# Patient Record
Sex: MALE | Race: WHITE | Employment: FULL TIME | URBAN - METROPOLITAN AREA
[De-identification: names, ages, dates, MRNs, and addresses within clinical notes are randomized per-mention and may not be internally consistent; named-entity substitution may affect disease eponyms.]

---

## 2017-11-17 PROBLEM — E11.618 DIABETIC ARTHROPATHY (HCC): Status: ACTIVE | Noted: 2017-11-17

## 2017-11-17 PROBLEM — I96 GANGRENE OF TOE OF LEFT FOOT (HCC): Status: ACTIVE | Noted: 2017-11-17

## 2017-11-19 PROBLEM — E66.01 MORBID (SEVERE) OBESITY DUE TO EXCESS CALORIES (HCC): Status: ACTIVE | Noted: 2017-11-19

## 2019-07-26 LAB
AVERAGE GLUCOSE: NORMAL
HBA1C MFR BLD: 12.4 %

## 2019-10-18 ENCOUNTER — APPOINTMENT (OUTPATIENT)
Dept: GENERAL RADIOLOGY | Age: 54
DRG: 617 | End: 2019-10-18
Payer: COMMERCIAL

## 2019-10-18 ENCOUNTER — HOSPITAL ENCOUNTER (INPATIENT)
Age: 54
LOS: 5 days | Discharge: HOME | DRG: 617 | End: 2019-10-23
Attending: EMERGENCY MEDICINE | Admitting: INTERNAL MEDICINE
Payer: COMMERCIAL

## 2019-10-18 DIAGNOSIS — E11.621 DIABETIC ULCER OF TOE OF RIGHT FOOT ASSOCIATED WITH TYPE 2 DIABETES MELLITUS, UNSPECIFIED ULCER STAGE (HCC): ICD-10-CM

## 2019-10-18 DIAGNOSIS — E10.621 DIABETIC ULCER OF TOE OF LEFT FOOT ASSOCIATED WITH TYPE 1 DIABETES MELLITUS, WITH NECROSIS OF BONE (HCC): Primary | ICD-10-CM

## 2019-10-18 DIAGNOSIS — L97.524 DIABETIC ULCER OF TOE OF LEFT FOOT ASSOCIATED WITH TYPE 1 DIABETES MELLITUS, WITH NECROSIS OF BONE (HCC): Primary | ICD-10-CM

## 2019-10-18 DIAGNOSIS — L97.519 DIABETIC ULCER OF TOE OF RIGHT FOOT ASSOCIATED WITH TYPE 2 DIABETES MELLITUS, UNSPECIFIED ULCER STAGE (HCC): ICD-10-CM

## 2019-10-18 PROBLEM — E78.1 PURE HYPERTRIGLYCERIDEMIA: Status: ACTIVE | Noted: 2019-10-18

## 2019-10-18 PROBLEM — L08.9 DIABETIC FOOT INFECTION (HCC): Status: ACTIVE | Noted: 2019-10-18

## 2019-10-18 PROBLEM — E11.628 DIABETIC FOOT INFECTION (HCC): Status: ACTIVE | Noted: 2019-10-18

## 2019-10-18 LAB
ALBUMIN SERPL-MCNC: 4 G/DL (ref 3.5–5.2)
ALP BLD-CCNC: 111 U/L (ref 40–129)
ALT SERPL-CCNC: 19 U/L (ref 0–40)
ANION GAP SERPL CALCULATED.3IONS-SCNC: 11 MMOL/L (ref 7–16)
AST SERPL-CCNC: 15 U/L (ref 0–39)
BASOPHILS ABSOLUTE: 0.07 E9/L (ref 0–0.2)
BASOPHILS RELATIVE PERCENT: 0.9 % (ref 0–2)
BILIRUB SERPL-MCNC: 0.5 MG/DL (ref 0–1.2)
BUN BLDV-MCNC: 13 MG/DL (ref 6–20)
CALCIUM SERPL-MCNC: 9.6 MG/DL (ref 8.6–10.2)
CHLORIDE BLD-SCNC: 95 MMOL/L (ref 98–107)
CO2: 26 MMOL/L (ref 22–29)
CREAT SERPL-MCNC: 0.8 MG/DL (ref 0.7–1.2)
EOSINOPHILS ABSOLUTE: 0 E9/L (ref 0.05–0.5)
EOSINOPHILS RELATIVE PERCENT: 0 % (ref 0–6)
GFR AFRICAN AMERICAN: >60
GFR NON-AFRICAN AMERICAN: >60 ML/MIN/1.73
GLUCOSE BLD-MCNC: 246 MG/DL (ref 74–99)
HBA1C MFR BLD: 10.1 % (ref 4–5.6)
HCT VFR BLD CALC: 44.5 % (ref 37–54)
HEMOGLOBIN: 15 G/DL (ref 12.5–16.5)
IMMATURE GRANULOCYTES #: 0.02 E9/L
IMMATURE GRANULOCYTES %: 0.3 % (ref 0–5)
LYMPHOCYTES ABSOLUTE: 2.47 E9/L (ref 1.5–4)
LYMPHOCYTES RELATIVE PERCENT: 31.9 % (ref 20–42)
MCH RBC QN AUTO: 28.8 PG (ref 26–35)
MCHC RBC AUTO-ENTMCNC: 33.7 % (ref 32–34.5)
MCV RBC AUTO: 85.6 FL (ref 80–99.9)
METER GLUCOSE: 210 MG/DL (ref 74–99)
METER GLUCOSE: 244 MG/DL (ref 74–99)
MONOCYTES ABSOLUTE: 0.6 E9/L (ref 0.1–0.95)
MONOCYTES RELATIVE PERCENT: 7.7 % (ref 2–12)
NEUTROPHILS ABSOLUTE: 4.59 E9/L (ref 1.8–7.3)
NEUTROPHILS RELATIVE PERCENT: 59.2 % (ref 43–80)
PDW BLD-RTO: 12.7 FL (ref 11.5–15)
PLATELET # BLD: 285 E9/L (ref 130–450)
PMV BLD AUTO: 9.3 FL (ref 7–12)
POTASSIUM SERPL-SCNC: 4.1 MMOL/L (ref 3.5–5)
RBC # BLD: 5.2 E12/L (ref 3.8–5.8)
SODIUM BLD-SCNC: 132 MMOL/L (ref 132–146)
TOTAL PROTEIN: 8.2 G/DL (ref 6.4–8.3)
WBC # BLD: 7.8 E9/L (ref 4.5–11.5)

## 2019-10-18 PROCEDURE — 2580000003 HC RX 258: Performed by: NURSE PRACTITIONER

## 2019-10-18 PROCEDURE — APPSS45 APP SPLIT SHARED TIME 31-45 MINUTES: Performed by: NURSE PRACTITIONER

## 2019-10-18 PROCEDURE — 82962 GLUCOSE BLOOD TEST: CPT

## 2019-10-18 PROCEDURE — 71046 X-RAY EXAM CHEST 2 VIEWS: CPT

## 2019-10-18 PROCEDURE — 85025 COMPLETE CBC W/AUTO DIFF WBC: CPT

## 2019-10-18 PROCEDURE — 6360000002 HC RX W HCPCS: Performed by: EMERGENCY MEDICINE

## 2019-10-18 PROCEDURE — 93005 ELECTROCARDIOGRAM TRACING: CPT | Performed by: EMERGENCY MEDICINE

## 2019-10-18 PROCEDURE — 6370000000 HC RX 637 (ALT 250 FOR IP): Performed by: NURSE PRACTITIONER

## 2019-10-18 PROCEDURE — 99223 1ST HOSP IP/OBS HIGH 75: CPT | Performed by: INTERNAL MEDICINE

## 2019-10-18 PROCEDURE — 80053 COMPREHEN METABOLIC PANEL: CPT

## 2019-10-18 PROCEDURE — 1200000000 HC SEMI PRIVATE

## 2019-10-18 PROCEDURE — 73630 X-RAY EXAM OF FOOT: CPT

## 2019-10-18 PROCEDURE — 2580000003 HC RX 258: Performed by: EMERGENCY MEDICINE

## 2019-10-18 PROCEDURE — 83036 HEMOGLOBIN GLYCOSYLATED A1C: CPT

## 2019-10-18 PROCEDURE — 6360000002 HC RX W HCPCS: Performed by: NURSE PRACTITIONER

## 2019-10-18 PROCEDURE — 87040 BLOOD CULTURE FOR BACTERIA: CPT

## 2019-10-18 PROCEDURE — 96367 TX/PROPH/DG ADDL SEQ IV INF: CPT

## 2019-10-18 PROCEDURE — 36415 COLL VENOUS BLD VENIPUNCTURE: CPT

## 2019-10-18 PROCEDURE — 99285 EMERGENCY DEPT VISIT HI MDM: CPT

## 2019-10-18 PROCEDURE — 96365 THER/PROPH/DIAG IV INF INIT: CPT

## 2019-10-18 RX ORDER — ACETAMINOPHEN 325 MG/1
650 TABLET ORAL EVERY 4 HOURS PRN
Status: DISCONTINUED | OUTPATIENT
Start: 2019-10-18 | End: 2019-10-24 | Stop reason: HOSPADM

## 2019-10-18 RX ORDER — INSULIN GLARGINE 100 [IU]/ML
56 INJECTION, SOLUTION SUBCUTANEOUS NIGHTLY
Status: DISCONTINUED | OUTPATIENT
Start: 2019-10-18 | End: 2019-10-20

## 2019-10-18 RX ORDER — SODIUM CHLORIDE 0.9 % (FLUSH) 0.9 %
10 SYRINGE (ML) INJECTION EVERY 12 HOURS SCHEDULED
Status: DISCONTINUED | OUTPATIENT
Start: 2019-10-18 | End: 2019-10-24 | Stop reason: HOSPADM

## 2019-10-18 RX ORDER — ATORVASTATIN CALCIUM 40 MG/1
40 TABLET, FILM COATED ORAL DAILY
Status: DISCONTINUED | OUTPATIENT
Start: 2019-10-18 | End: 2019-10-24 | Stop reason: HOSPADM

## 2019-10-18 RX ORDER — 0.9 % SODIUM CHLORIDE 0.9 %
500 INTRAVENOUS SOLUTION INTRAVENOUS ONCE
Status: COMPLETED | OUTPATIENT
Start: 2019-10-18 | End: 2019-10-18

## 2019-10-18 RX ORDER — SODIUM CHLORIDE 0.9 % (FLUSH) 0.9 %
10 SYRINGE (ML) INJECTION PRN
Status: DISCONTINUED | OUTPATIENT
Start: 2019-10-18 | End: 2019-10-24 | Stop reason: HOSPADM

## 2019-10-18 RX ORDER — ATORVASTATIN CALCIUM 40 MG/1
40 TABLET, FILM COATED ORAL DAILY
COMMUNITY
End: 2022-03-15 | Stop reason: SDUPTHER

## 2019-10-18 RX ORDER — CETIRIZINE HYDROCHLORIDE 10 MG/1
10 TABLET ORAL DAILY
Status: DISCONTINUED | OUTPATIENT
Start: 2019-10-19 | End: 2019-10-24 | Stop reason: HOSPADM

## 2019-10-18 RX ORDER — ONDANSETRON 2 MG/ML
4 INJECTION INTRAMUSCULAR; INTRAVENOUS EVERY 6 HOURS PRN
Status: DISCONTINUED | OUTPATIENT
Start: 2019-10-18 | End: 2019-10-24 | Stop reason: HOSPADM

## 2019-10-18 RX ADMIN — Medication 10 ML: at 21:09

## 2019-10-18 RX ADMIN — INSULIN LISPRO 2 UNITS: 100 INJECTION, SOLUTION INTRAVENOUS; SUBCUTANEOUS at 17:37

## 2019-10-18 RX ADMIN — INSULIN LISPRO 1 UNITS: 100 INJECTION, SOLUTION INTRAVENOUS; SUBCUTANEOUS at 20:58

## 2019-10-18 RX ADMIN — ATORVASTATIN CALCIUM 40 MG: 40 TABLET, FILM COATED ORAL at 17:36

## 2019-10-18 RX ADMIN — VANCOMYCIN HYDROCHLORIDE 2500 MG: 5 INJECTION, POWDER, LYOPHILIZED, FOR SOLUTION INTRAVENOUS at 13:53

## 2019-10-18 RX ADMIN — ENOXAPARIN SODIUM 40 MG: 40 INJECTION SUBCUTANEOUS at 17:36

## 2019-10-18 RX ADMIN — PIPERACILLIN SODIUM AND TAZOBACTAM SODIUM 3.38 G: 3; .375 INJECTION, POWDER, LYOPHILIZED, FOR SOLUTION INTRAVENOUS at 21:08

## 2019-10-18 RX ADMIN — INSULIN GLARGINE 56 UNITS: 100 INJECTION, SOLUTION SUBCUTANEOUS at 20:58

## 2019-10-18 RX ADMIN — SODIUM CHLORIDE 500 ML: 9 INJECTION, SOLUTION INTRAVENOUS at 13:06

## 2019-10-18 RX ADMIN — PIPERACILLIN AND TAZOBACTAM 3.38 G: 3; .375 INJECTION, POWDER, FOR SOLUTION INTRAVENOUS at 13:05

## 2019-10-18 ASSESSMENT — PAIN SCALES - GENERAL
PAINLEVEL_OUTOF10: 0
PAINLEVEL_OUTOF10: 3
PAINLEVEL_OUTOF10: 0

## 2019-10-18 ASSESSMENT — PAIN DESCRIPTION - ORIENTATION: ORIENTATION: LEFT

## 2019-10-18 ASSESSMENT — ENCOUNTER SYMPTOMS
ABDOMINAL PAIN: 0
SHORTNESS OF BREATH: 0
COLOR CHANGE: 1
CONSTIPATION: 0
DIARRHEA: 0
NAUSEA: 0
BACK PAIN: 0
VOMITING: 0

## 2019-10-18 ASSESSMENT — PAIN DESCRIPTION - PAIN TYPE: TYPE: ACUTE PAIN

## 2019-10-18 ASSESSMENT — PAIN DESCRIPTION - LOCATION: LOCATION: FOOT

## 2019-10-18 ASSESSMENT — PAIN DESCRIPTION - DESCRIPTORS: DESCRIPTORS: CONSTANT;ACHING

## 2019-10-19 LAB
ANION GAP SERPL CALCULATED.3IONS-SCNC: 9 MMOL/L (ref 7–16)
BUN BLDV-MCNC: 12 MG/DL (ref 6–20)
C-REACTIVE PROTEIN: 2.5 MG/DL (ref 0–0.4)
CALCIUM SERPL-MCNC: 8.5 MG/DL (ref 8.6–10.2)
CHLORIDE BLD-SCNC: 98 MMOL/L (ref 98–107)
CO2: 24 MMOL/L (ref 22–29)
CREAT SERPL-MCNC: 0.9 MG/DL (ref 0.7–1.2)
EKG ATRIAL RATE: 95 BPM
EKG P AXIS: 42 DEGREES
EKG P-R INTERVAL: 208 MS
EKG Q-T INTERVAL: 348 MS
EKG QRS DURATION: 78 MS
EKG QTC CALCULATION (BAZETT): 437 MS
EKG R AXIS: 3 DEGREES
EKG T AXIS: 45 DEGREES
EKG VENTRICULAR RATE: 95 BPM
GFR AFRICAN AMERICAN: >60
GFR NON-AFRICAN AMERICAN: >60 ML/MIN/1.73
GLUCOSE BLD-MCNC: 271 MG/DL (ref 74–99)
HCT VFR BLD CALC: 44.8 % (ref 37–54)
HEMOGLOBIN: 14.7 G/DL (ref 12.5–16.5)
MCH RBC QN AUTO: 28.4 PG (ref 26–35)
MCHC RBC AUTO-ENTMCNC: 32.8 % (ref 32–34.5)
MCV RBC AUTO: 86.5 FL (ref 80–99.9)
METER GLUCOSE: 229 MG/DL (ref 74–99)
METER GLUCOSE: 246 MG/DL (ref 74–99)
METER GLUCOSE: 293 MG/DL (ref 74–99)
METER GLUCOSE: 302 MG/DL (ref 74–99)
PDW BLD-RTO: 12.6 FL (ref 11.5–15)
PLATELET # BLD: 251 E9/L (ref 130–450)
PMV BLD AUTO: 9.3 FL (ref 7–12)
POTASSIUM REFLEX MAGNESIUM: 4.1 MMOL/L (ref 3.5–5)
RBC # BLD: 5.18 E12/L (ref 3.8–5.8)
SEDIMENTATION RATE, ERYTHROCYTE: 23 MM/HR (ref 0–15)
SODIUM BLD-SCNC: 131 MMOL/L (ref 132–146)
WBC # BLD: 6.2 E9/L (ref 4.5–11.5)

## 2019-10-19 PROCEDURE — 1200000000 HC SEMI PRIVATE

## 2019-10-19 PROCEDURE — APPSS30 APP SPLIT SHARED TIME 16-30 MINUTES: Performed by: NURSE PRACTITIONER

## 2019-10-19 PROCEDURE — 85651 RBC SED RATE NONAUTOMATED: CPT

## 2019-10-19 PROCEDURE — 85027 COMPLETE CBC AUTOMATED: CPT

## 2019-10-19 PROCEDURE — 87070 CULTURE OTHR SPECIMN AEROBIC: CPT

## 2019-10-19 PROCEDURE — 99233 SBSQ HOSP IP/OBS HIGH 50: CPT | Performed by: INTERNAL MEDICINE

## 2019-10-19 PROCEDURE — 93010 ELECTROCARDIOGRAM REPORT: CPT | Performed by: INTERNAL MEDICINE

## 2019-10-19 PROCEDURE — 87186 SC STD MICRODIL/AGAR DIL: CPT

## 2019-10-19 PROCEDURE — 86140 C-REACTIVE PROTEIN: CPT

## 2019-10-19 PROCEDURE — 2580000003 HC RX 258: Performed by: NURSE PRACTITIONER

## 2019-10-19 PROCEDURE — 82962 GLUCOSE BLOOD TEST: CPT

## 2019-10-19 PROCEDURE — 6370000000 HC RX 637 (ALT 250 FOR IP): Performed by: NURSE PRACTITIONER

## 2019-10-19 PROCEDURE — 6360000002 HC RX W HCPCS: Performed by: NURSE PRACTITIONER

## 2019-10-19 PROCEDURE — 36415 COLL VENOUS BLD VENIPUNCTURE: CPT

## 2019-10-19 PROCEDURE — 80048 BASIC METABOLIC PNL TOTAL CA: CPT

## 2019-10-19 RX ORDER — DEXTROSE MONOHYDRATE 25 G/50ML
12.5 INJECTION, SOLUTION INTRAVENOUS PRN
Status: DISCONTINUED | OUTPATIENT
Start: 2019-10-19 | End: 2019-10-24 | Stop reason: HOSPADM

## 2019-10-19 RX ORDER — DEXTROSE MONOHYDRATE 50 MG/ML
100 INJECTION, SOLUTION INTRAVENOUS PRN
Status: DISCONTINUED | OUTPATIENT
Start: 2019-10-19 | End: 2019-10-24 | Stop reason: HOSPADM

## 2019-10-19 RX ORDER — NICOTINE POLACRILEX 4 MG
15 LOZENGE BUCCAL PRN
Status: DISCONTINUED | OUTPATIENT
Start: 2019-10-19 | End: 2019-10-24 | Stop reason: HOSPADM

## 2019-10-19 RX ADMIN — Medication 10 ML: at 08:46

## 2019-10-19 RX ADMIN — INSULIN LISPRO 4 UNITS: 100 INJECTION, SOLUTION INTRAVENOUS; SUBCUTANEOUS at 16:38

## 2019-10-19 RX ADMIN — ENOXAPARIN SODIUM 40 MG: 40 INJECTION SUBCUTANEOUS at 15:34

## 2019-10-19 RX ADMIN — PIPERACILLIN SODIUM AND TAZOBACTAM SODIUM 3.38 G: 3; .375 INJECTION, POWDER, LYOPHILIZED, FOR SOLUTION INTRAVENOUS at 05:16

## 2019-10-19 RX ADMIN — INSULIN LISPRO 4 UNITS: 100 INJECTION, SOLUTION INTRAVENOUS; SUBCUTANEOUS at 12:09

## 2019-10-19 RX ADMIN — Medication 2000 MG: at 01:41

## 2019-10-19 RX ADMIN — PIPERACILLIN SODIUM AND TAZOBACTAM SODIUM 3.38 G: 3; .375 INJECTION, POWDER, LYOPHILIZED, FOR SOLUTION INTRAVENOUS at 12:48

## 2019-10-19 RX ADMIN — ATORVASTATIN CALCIUM 40 MG: 40 TABLET, FILM COATED ORAL at 08:45

## 2019-10-19 RX ADMIN — Medication 10 ML: at 20:29

## 2019-10-19 RX ADMIN — INSULIN LISPRO 3 UNITS: 100 INJECTION, SOLUTION INTRAVENOUS; SUBCUTANEOUS at 08:46

## 2019-10-19 RX ADMIN — INSULIN LISPRO 4 UNITS: 100 INJECTION, SOLUTION INTRAVENOUS; SUBCUTANEOUS at 20:27

## 2019-10-19 RX ADMIN — INSULIN GLARGINE 56 UNITS: 100 INJECTION, SOLUTION SUBCUTANEOUS at 20:28

## 2019-10-19 RX ADMIN — Medication 2000 MG: at 14:38

## 2019-10-19 RX ADMIN — PIPERACILLIN SODIUM AND TAZOBACTAM SODIUM 3.38 G: 3; .375 INJECTION, POWDER, LYOPHILIZED, FOR SOLUTION INTRAVENOUS at 20:29

## 2019-10-19 ASSESSMENT — PAIN - FUNCTIONAL ASSESSMENT: PAIN_FUNCTIONAL_ASSESSMENT: PREVENTS OR INTERFERES SOME ACTIVE ACTIVITIES AND ADLS

## 2019-10-19 ASSESSMENT — PAIN SCALES - GENERAL
PAINLEVEL_OUTOF10: 0

## 2019-10-19 ASSESSMENT — PAIN DESCRIPTION - PROGRESSION
CLINICAL_PROGRESSION: NOT CHANGED

## 2019-10-19 ASSESSMENT — PAIN DESCRIPTION - PAIN TYPE: TYPE: ACUTE PAIN

## 2019-10-19 ASSESSMENT — PAIN DESCRIPTION - ONSET: ONSET: ON-GOING

## 2019-10-19 ASSESSMENT — PAIN DESCRIPTION - DESCRIPTORS: DESCRIPTORS: CONSTANT;ACHING

## 2019-10-19 ASSESSMENT — PAIN DESCRIPTION - LOCATION: LOCATION: FOOT

## 2019-10-19 ASSESSMENT — PAIN DESCRIPTION - ORIENTATION: ORIENTATION: LEFT

## 2019-10-20 LAB
METER GLUCOSE: 188 MG/DL (ref 74–99)
METER GLUCOSE: 207 MG/DL (ref 74–99)
METER GLUCOSE: 223 MG/DL (ref 74–99)
METER GLUCOSE: 263 MG/DL (ref 74–99)
VANCOMYCIN TROUGH: 18.1 MCG/ML (ref 5–16)

## 2019-10-20 PROCEDURE — 6370000000 HC RX 637 (ALT 250 FOR IP): Performed by: PODIATRIST

## 2019-10-20 PROCEDURE — 80202 ASSAY OF VANCOMYCIN: CPT

## 2019-10-20 PROCEDURE — 6360000002 HC RX W HCPCS: Performed by: NURSE PRACTITIONER

## 2019-10-20 PROCEDURE — 36415 COLL VENOUS BLD VENIPUNCTURE: CPT

## 2019-10-20 PROCEDURE — 82962 GLUCOSE BLOOD TEST: CPT

## 2019-10-20 PROCEDURE — 99233 SBSQ HOSP IP/OBS HIGH 50: CPT | Performed by: INTERNAL MEDICINE

## 2019-10-20 PROCEDURE — 2580000003 HC RX 258: Performed by: NURSE PRACTITIONER

## 2019-10-20 PROCEDURE — 1200000000 HC SEMI PRIVATE

## 2019-10-20 PROCEDURE — 6370000000 HC RX 637 (ALT 250 FOR IP): Performed by: NURSE PRACTITIONER

## 2019-10-20 PROCEDURE — APPSS30 APP SPLIT SHARED TIME 16-30 MINUTES: Performed by: NURSE PRACTITIONER

## 2019-10-20 RX ORDER — INSULIN GLARGINE 100 [IU]/ML
30 INJECTION, SOLUTION SUBCUTANEOUS 2 TIMES DAILY
Status: DISCONTINUED | OUTPATIENT
Start: 2019-10-20 | End: 2019-10-24 | Stop reason: HOSPADM

## 2019-10-20 RX ADMIN — PIPERACILLIN SODIUM AND TAZOBACTAM SODIUM 3.38 G: 3; .375 INJECTION, POWDER, LYOPHILIZED, FOR SOLUTION INTRAVENOUS at 14:18

## 2019-10-20 RX ADMIN — HYOSCYAMINE SULFATE: 16 SOLUTION at 14:22

## 2019-10-20 RX ADMIN — INSULIN LISPRO 2 UNITS: 100 INJECTION, SOLUTION INTRAVENOUS; SUBCUTANEOUS at 12:03

## 2019-10-20 RX ADMIN — Medication 10 ML: at 09:23

## 2019-10-20 RX ADMIN — ATORVASTATIN CALCIUM 40 MG: 40 TABLET, FILM COATED ORAL at 09:21

## 2019-10-20 RX ADMIN — PIPERACILLIN SODIUM AND TAZOBACTAM SODIUM 3.38 G: 3; .375 INJECTION, POWDER, LYOPHILIZED, FOR SOLUTION INTRAVENOUS at 20:31

## 2019-10-20 RX ADMIN — PIPERACILLIN SODIUM AND TAZOBACTAM SODIUM 3.38 G: 3; .375 INJECTION, POWDER, LYOPHILIZED, FOR SOLUTION INTRAVENOUS at 04:42

## 2019-10-20 RX ADMIN — INSULIN GLARGINE 30 UNITS: 100 INJECTION, SOLUTION SUBCUTANEOUS at 20:31

## 2019-10-20 RX ADMIN — INSULIN LISPRO 4 UNITS: 100 INJECTION, SOLUTION INTRAVENOUS; SUBCUTANEOUS at 17:09

## 2019-10-20 RX ADMIN — INSULIN GLARGINE 30 UNITS: 100 INJECTION, SOLUTION SUBCUTANEOUS at 12:02

## 2019-10-20 RX ADMIN — Medication 10 ML: at 20:49

## 2019-10-20 RX ADMIN — Medication 2000 MG: at 14:17

## 2019-10-20 RX ADMIN — ENOXAPARIN SODIUM 40 MG: 40 INJECTION SUBCUTANEOUS at 09:24

## 2019-10-20 RX ADMIN — INSULIN LISPRO 6 UNITS: 100 INJECTION, SOLUTION INTRAVENOUS; SUBCUTANEOUS at 09:20

## 2019-10-20 RX ADMIN — Medication 2000 MG: at 01:30

## 2019-10-20 RX ADMIN — INSULIN LISPRO 2 UNITS: 100 INJECTION, SOLUTION INTRAVENOUS; SUBCUTANEOUS at 20:31

## 2019-10-20 ASSESSMENT — PAIN SCALES - GENERAL
PAINLEVEL_OUTOF10: 0
PAINLEVEL_OUTOF10: 0

## 2019-10-21 ENCOUNTER — ANESTHESIA (OUTPATIENT)
Dept: OPERATING ROOM | Age: 54
DRG: 617 | End: 2019-10-21
Payer: COMMERCIAL

## 2019-10-21 ENCOUNTER — ANESTHESIA EVENT (OUTPATIENT)
Dept: OPERATING ROOM | Age: 54
DRG: 617 | End: 2019-10-21
Payer: COMMERCIAL

## 2019-10-21 VITALS
DIASTOLIC BLOOD PRESSURE: 70 MMHG | SYSTOLIC BLOOD PRESSURE: 119 MMHG | RESPIRATION RATE: 21 BRPM | OXYGEN SATURATION: 97 %

## 2019-10-21 LAB
METER GLUCOSE: 145 MG/DL (ref 74–99)
METER GLUCOSE: 168 MG/DL (ref 74–99)
METER GLUCOSE: 185 MG/DL (ref 74–99)
METER GLUCOSE: 292 MG/DL (ref 74–99)

## 2019-10-21 PROCEDURE — 88311 DECALCIFY TISSUE: CPT

## 2019-10-21 PROCEDURE — 1200000000 HC SEMI PRIVATE

## 2019-10-21 PROCEDURE — 2580000003 HC RX 258: Performed by: NURSE PRACTITIONER

## 2019-10-21 PROCEDURE — 87075 CULTR BACTERIA EXCEPT BLOOD: CPT

## 2019-10-21 PROCEDURE — 0Y6S0Z0 DETACHMENT AT LEFT 2ND TOE, COMPLETE, OPEN APPROACH: ICD-10-PCS | Performed by: PODIATRIST

## 2019-10-21 PROCEDURE — APPSS30 APP SPLIT SHARED TIME 16-30 MINUTES: Performed by: NURSE PRACTITIONER

## 2019-10-21 PROCEDURE — 87205 SMEAR GRAM STAIN: CPT

## 2019-10-21 PROCEDURE — 82962 GLUCOSE BLOOD TEST: CPT

## 2019-10-21 PROCEDURE — 6360000002 HC RX W HCPCS: Performed by: NURSE ANESTHETIST, CERTIFIED REGISTERED

## 2019-10-21 PROCEDURE — 2500000003 HC RX 250 WO HCPCS: Performed by: PODIATRIST

## 2019-10-21 PROCEDURE — 7100000000 HC PACU RECOVERY - FIRST 15 MIN: Performed by: PODIATRIST

## 2019-10-21 PROCEDURE — 3600000002 HC SURGERY LEVEL 2 BASE: Performed by: PODIATRIST

## 2019-10-21 PROCEDURE — 88305 TISSUE EXAM BY PATHOLOGIST: CPT

## 2019-10-21 PROCEDURE — 3600000012 HC SURGERY LEVEL 2 ADDTL 15MIN: Performed by: PODIATRIST

## 2019-10-21 PROCEDURE — 2709999900 HC NON-CHARGEABLE SUPPLY: Performed by: PODIATRIST

## 2019-10-21 PROCEDURE — 6370000000 HC RX 637 (ALT 250 FOR IP): Performed by: SPECIALIST

## 2019-10-21 PROCEDURE — 99233 SBSQ HOSP IP/OBS HIGH 50: CPT | Performed by: INTERNAL MEDICINE

## 2019-10-21 PROCEDURE — 3700000001 HC ADD 15 MINUTES (ANESTHESIA): Performed by: PODIATRIST

## 2019-10-21 PROCEDURE — 3700000000 HC ANESTHESIA ATTENDED CARE: Performed by: PODIATRIST

## 2019-10-21 PROCEDURE — 6370000000 HC RX 637 (ALT 250 FOR IP): Performed by: NURSE PRACTITIONER

## 2019-10-21 PROCEDURE — 2580000003 HC RX 258: Performed by: NURSE ANESTHETIST, CERTIFIED REGISTERED

## 2019-10-21 PROCEDURE — 7100000001 HC PACU RECOVERY - ADDTL 15 MIN: Performed by: PODIATRIST

## 2019-10-21 PROCEDURE — 6360000002 HC RX W HCPCS: Performed by: NURSE PRACTITIONER

## 2019-10-21 PROCEDURE — 87070 CULTURE OTHR SPECIMN AEROBIC: CPT

## 2019-10-21 RX ORDER — LIDOCAINE HYDROCHLORIDE 10 MG/ML
INJECTION, SOLUTION EPIDURAL; INFILTRATION; INTRACAUDAL; PERINEURAL PRN
Status: DISCONTINUED | OUTPATIENT
Start: 2019-10-21 | End: 2019-10-21 | Stop reason: ALTCHOICE

## 2019-10-21 RX ORDER — LABETALOL 20 MG/4 ML (5 MG/ML) INTRAVENOUS SYRINGE
5 EVERY 10 MIN PRN
Status: DISCONTINUED | OUTPATIENT
Start: 2019-10-21 | End: 2019-10-21 | Stop reason: HOSPADM

## 2019-10-21 RX ORDER — SODIUM CHLORIDE 9 MG/ML
INJECTION, SOLUTION INTRAVENOUS CONTINUOUS PRN
Status: DISCONTINUED | OUTPATIENT
Start: 2019-10-21 | End: 2019-10-21 | Stop reason: SDUPTHER

## 2019-10-21 RX ORDER — SODIUM CHLORIDE 0.9 % (FLUSH) 0.9 %
10 SYRINGE (ML) INJECTION PRN
Status: DISCONTINUED | OUTPATIENT
Start: 2019-10-21 | End: 2019-10-21 | Stop reason: HOSPADM

## 2019-10-21 RX ORDER — HYDROMORPHONE HYDROCHLORIDE 1 MG/ML
0.5 INJECTION, SOLUTION INTRAMUSCULAR; INTRAVENOUS; SUBCUTANEOUS EVERY 5 MIN PRN
Status: DISCONTINUED | OUTPATIENT
Start: 2019-10-21 | End: 2019-10-21 | Stop reason: HOSPADM

## 2019-10-21 RX ORDER — HYDROMORPHONE HYDROCHLORIDE 1 MG/ML
0.25 INJECTION, SOLUTION INTRAMUSCULAR; INTRAVENOUS; SUBCUTANEOUS EVERY 5 MIN PRN
Status: DISCONTINUED | OUTPATIENT
Start: 2019-10-21 | End: 2019-10-21 | Stop reason: HOSPADM

## 2019-10-21 RX ORDER — PROMETHAZINE HYDROCHLORIDE 25 MG/ML
25 INJECTION, SOLUTION INTRAMUSCULAR; INTRAVENOUS PRN
Status: DISCONTINUED | OUTPATIENT
Start: 2019-10-21 | End: 2019-10-21 | Stop reason: HOSPADM

## 2019-10-21 RX ORDER — PROPOFOL 10 MG/ML
INJECTION, EMULSION INTRAVENOUS CONTINUOUS PRN
Status: DISCONTINUED | OUTPATIENT
Start: 2019-10-21 | End: 2019-10-21 | Stop reason: SDUPTHER

## 2019-10-21 RX ORDER — MEPERIDINE HYDROCHLORIDE 25 MG/ML
12.5 INJECTION INTRAMUSCULAR; INTRAVENOUS; SUBCUTANEOUS EVERY 5 MIN PRN
Status: DISCONTINUED | OUTPATIENT
Start: 2019-10-21 | End: 2019-10-21 | Stop reason: HOSPADM

## 2019-10-21 RX ORDER — MIDAZOLAM HYDROCHLORIDE 1 MG/ML
INJECTION INTRAMUSCULAR; INTRAVENOUS PRN
Status: DISCONTINUED | OUTPATIENT
Start: 2019-10-21 | End: 2019-10-21 | Stop reason: SDUPTHER

## 2019-10-21 RX ORDER — SODIUM CHLORIDE 0.9 % (FLUSH) 0.9 %
10 SYRINGE (ML) INJECTION EVERY 12 HOURS SCHEDULED
Status: DISCONTINUED | OUTPATIENT
Start: 2019-10-21 | End: 2019-10-21 | Stop reason: HOSPADM

## 2019-10-21 RX ADMIN — PIPERACILLIN SODIUM AND TAZOBACTAM SODIUM 3.38 G: 3; .375 INJECTION, POWDER, LYOPHILIZED, FOR SOLUTION INTRAVENOUS at 21:22

## 2019-10-21 RX ADMIN — Medication 10 ML: at 21:22

## 2019-10-21 RX ADMIN — PIPERACILLIN SODIUM AND TAZOBACTAM SODIUM 3.38 G: 3; .375 INJECTION, POWDER, LYOPHILIZED, FOR SOLUTION INTRAVENOUS at 13:14

## 2019-10-21 RX ADMIN — INSULIN GLARGINE 30 UNITS: 100 INJECTION, SOLUTION SUBCUTANEOUS at 21:23

## 2019-10-21 RX ADMIN — INSULIN LISPRO 1 UNITS: 100 INJECTION, SOLUTION INTRAVENOUS; SUBCUTANEOUS at 21:30

## 2019-10-21 RX ADMIN — PROPOFOL 100 MCG/KG/MIN: 10 INJECTION, EMULSION INTRAVENOUS at 19:23

## 2019-10-21 RX ADMIN — NYSTATIN 500000 UNITS: 100000 SUSPENSION ORAL at 21:22

## 2019-10-21 RX ADMIN — PIPERACILLIN SODIUM AND TAZOBACTAM SODIUM 3.38 G: 3; .375 INJECTION, POWDER, LYOPHILIZED, FOR SOLUTION INTRAVENOUS at 05:00

## 2019-10-21 RX ADMIN — Medication 2000 MG: at 13:54

## 2019-10-21 RX ADMIN — SODIUM CHLORIDE: 9 INJECTION, SOLUTION INTRAVENOUS at 19:20

## 2019-10-21 RX ADMIN — Medication 2000 MG: at 02:02

## 2019-10-21 RX ADMIN — Medication 10 ML: at 09:46

## 2019-10-21 RX ADMIN — MIDAZOLAM 2 MG: 1 INJECTION INTRAMUSCULAR; INTRAVENOUS at 19:20

## 2019-10-21 ASSESSMENT — PULMONARY FUNCTION TESTS
PIF_VALUE: 0
PIF_VALUE: 1
PIF_VALUE: 0
PIF_VALUE: 1
PIF_VALUE: 0
PIF_VALUE: 1
PIF_VALUE: 0
PIF_VALUE: 1

## 2019-10-21 ASSESSMENT — PAIN SCALES - GENERAL
PAINLEVEL_OUTOF10: 0

## 2019-10-22 LAB
ANION GAP SERPL CALCULATED.3IONS-SCNC: 12 MMOL/L (ref 7–16)
BUN BLDV-MCNC: 8 MG/DL (ref 6–20)
CALCIUM SERPL-MCNC: 8.5 MG/DL (ref 8.6–10.2)
CHLORIDE BLD-SCNC: 99 MMOL/L (ref 98–107)
CO2: 24 MMOL/L (ref 22–29)
CREAT SERPL-MCNC: 0.9 MG/DL (ref 0.7–1.2)
GFR AFRICAN AMERICAN: >60
GFR NON-AFRICAN AMERICAN: >60 ML/MIN/1.73
GLUCOSE BLD-MCNC: 264 MG/DL (ref 74–99)
HCT VFR BLD CALC: 42.2 % (ref 37–54)
HEMOGLOBIN: 13.7 G/DL (ref 12.5–16.5)
MCH RBC QN AUTO: 28.3 PG (ref 26–35)
MCHC RBC AUTO-ENTMCNC: 32.5 % (ref 32–34.5)
MCV RBC AUTO: 87.2 FL (ref 80–99.9)
METER GLUCOSE: 164 MG/DL (ref 74–99)
METER GLUCOSE: 177 MG/DL (ref 74–99)
METER GLUCOSE: 181 MG/DL (ref 74–99)
METER GLUCOSE: 220 MG/DL (ref 74–99)
ORGANISM: ABNORMAL
PDW BLD-RTO: 13 FL (ref 11.5–15)
PLATELET # BLD: 246 E9/L (ref 130–450)
PMV BLD AUTO: 9.6 FL (ref 7–12)
POTASSIUM SERPL-SCNC: 3.9 MMOL/L (ref 3.5–5)
RBC # BLD: 4.84 E12/L (ref 3.8–5.8)
SODIUM BLD-SCNC: 135 MMOL/L (ref 132–146)
WBC # BLD: 7.4 E9/L (ref 4.5–11.5)
WOUND/ABSCESS: ABNORMAL
WOUND/ABSCESS: ABNORMAL

## 2019-10-22 PROCEDURE — 6360000002 HC RX W HCPCS: Performed by: NURSE PRACTITIONER

## 2019-10-22 PROCEDURE — 85027 COMPLETE CBC AUTOMATED: CPT

## 2019-10-22 PROCEDURE — 80048 BASIC METABOLIC PNL TOTAL CA: CPT

## 2019-10-22 PROCEDURE — 6370000000 HC RX 637 (ALT 250 FOR IP): Performed by: SPECIALIST

## 2019-10-22 PROCEDURE — 82962 GLUCOSE BLOOD TEST: CPT

## 2019-10-22 PROCEDURE — 6370000000 HC RX 637 (ALT 250 FOR IP): Performed by: NURSE PRACTITIONER

## 2019-10-22 PROCEDURE — 1200000000 HC SEMI PRIVATE

## 2019-10-22 PROCEDURE — 2580000003 HC RX 258: Performed by: NURSE PRACTITIONER

## 2019-10-22 PROCEDURE — APPSS30 APP SPLIT SHARED TIME 16-30 MINUTES: Performed by: NURSE PRACTITIONER

## 2019-10-22 PROCEDURE — 36415 COLL VENOUS BLD VENIPUNCTURE: CPT

## 2019-10-22 PROCEDURE — 99233 SBSQ HOSP IP/OBS HIGH 50: CPT | Performed by: INTERNAL MEDICINE

## 2019-10-22 RX ADMIN — Medication 10 ML: at 20:48

## 2019-10-22 RX ADMIN — INSULIN LISPRO 4 UNITS: 100 INJECTION, SOLUTION INTRAVENOUS; SUBCUTANEOUS at 17:03

## 2019-10-22 RX ADMIN — NYSTATIN 500000 UNITS: 100000 SUSPENSION ORAL at 08:35

## 2019-10-22 RX ADMIN — Medication 2000 MG: at 01:54

## 2019-10-22 RX ADMIN — CETIRIZINE HYDROCHLORIDE 10 MG: 10 TABLET, FILM COATED ORAL at 08:35

## 2019-10-22 RX ADMIN — ATORVASTATIN CALCIUM 40 MG: 40 TABLET, FILM COATED ORAL at 08:35

## 2019-10-22 RX ADMIN — Medication 2000 MG: at 14:55

## 2019-10-22 RX ADMIN — INSULIN LISPRO 2 UNITS: 100 INJECTION, SOLUTION INTRAVENOUS; SUBCUTANEOUS at 12:09

## 2019-10-22 RX ADMIN — NYSTATIN 500000 UNITS: 100000 SUSPENSION ORAL at 20:46

## 2019-10-22 RX ADMIN — PIPERACILLIN SODIUM AND TAZOBACTAM SODIUM 3.38 G: 3; .375 INJECTION, POWDER, LYOPHILIZED, FOR SOLUTION INTRAVENOUS at 04:54

## 2019-10-22 RX ADMIN — INSULIN GLARGINE 30 UNITS: 100 INJECTION, SOLUTION SUBCUTANEOUS at 08:38

## 2019-10-22 RX ADMIN — PIPERACILLIN SODIUM AND TAZOBACTAM SODIUM 3.38 G: 3; .375 INJECTION, POWDER, LYOPHILIZED, FOR SOLUTION INTRAVENOUS at 13:30

## 2019-10-22 RX ADMIN — INSULIN LISPRO 1 UNITS: 100 INJECTION, SOLUTION INTRAVENOUS; SUBCUTANEOUS at 20:46

## 2019-10-22 RX ADMIN — INSULIN LISPRO 2 UNITS: 100 INJECTION, SOLUTION INTRAVENOUS; SUBCUTANEOUS at 08:38

## 2019-10-22 RX ADMIN — INSULIN GLARGINE 30 UNITS: 100 INJECTION, SOLUTION SUBCUTANEOUS at 20:46

## 2019-10-22 RX ADMIN — NYSTATIN 500000 UNITS: 100000 SUSPENSION ORAL at 17:03

## 2019-10-22 RX ADMIN — NYSTATIN 500000 UNITS: 100000 SUSPENSION ORAL at 13:30

## 2019-10-22 RX ADMIN — PIPERACILLIN SODIUM AND TAZOBACTAM SODIUM 3.38 G: 3; .375 INJECTION, POWDER, LYOPHILIZED, FOR SOLUTION INTRAVENOUS at 20:46

## 2019-10-22 RX ADMIN — ENOXAPARIN SODIUM 40 MG: 40 INJECTION SUBCUTANEOUS at 08:35

## 2019-10-22 ASSESSMENT — PAIN SCALES - GENERAL
PAINLEVEL_OUTOF10: 0

## 2019-10-23 VITALS
DIASTOLIC BLOOD PRESSURE: 70 MMHG | RESPIRATION RATE: 18 BRPM | HEART RATE: 90 BPM | SYSTOLIC BLOOD PRESSURE: 130 MMHG | HEIGHT: 74 IN | WEIGHT: 315 LBS | BODY MASS INDEX: 40.43 KG/M2 | OXYGEN SATURATION: 95 % | TEMPERATURE: 98.4 F

## 2019-10-23 LAB
ANION GAP SERPL CALCULATED.3IONS-SCNC: 10 MMOL/L (ref 7–16)
ANION GAP SERPL CALCULATED.3IONS-SCNC: 11 MMOL/L (ref 7–16)
ANION GAP SERPL CALCULATED.3IONS-SCNC: 13 MMOL/L (ref 7–16)
BLOOD CULTURE, ROUTINE: NORMAL
BUN BLDV-MCNC: 14 MG/DL (ref 6–20)
BUN BLDV-MCNC: 15 MG/DL (ref 6–20)
BUN BLDV-MCNC: 17 MG/DL (ref 6–20)
CALCIUM SERPL-MCNC: 8.9 MG/DL (ref 8.6–10.2)
CALCIUM SERPL-MCNC: 9.3 MG/DL (ref 8.6–10.2)
CALCIUM SERPL-MCNC: 9.5 MG/DL (ref 8.6–10.2)
CHLORIDE BLD-SCNC: 101 MMOL/L (ref 98–107)
CHLORIDE BLD-SCNC: 103 MMOL/L (ref 98–107)
CHLORIDE BLD-SCNC: 104 MMOL/L (ref 98–107)
CO2: 25 MMOL/L (ref 22–29)
CO2: 26 MMOL/L (ref 22–29)
CO2: 26 MMOL/L (ref 22–29)
CREAT SERPL-MCNC: 1.5 MG/DL (ref 0.7–1.2)
CREAT SERPL-MCNC: 1.6 MG/DL (ref 0.7–1.2)
CREAT SERPL-MCNC: 1.6 MG/DL (ref 0.7–1.2)
CULTURE, BLOOD 2: NORMAL
GFR AFRICAN AMERICAN: 55
GFR AFRICAN AMERICAN: 55
GFR AFRICAN AMERICAN: 59
GFR NON-AFRICAN AMERICAN: 45 ML/MIN/1.73
GFR NON-AFRICAN AMERICAN: 45 ML/MIN/1.73
GFR NON-AFRICAN AMERICAN: 49 ML/MIN/1.73
GLUCOSE BLD-MCNC: 168 MG/DL (ref 74–99)
GLUCOSE BLD-MCNC: 174 MG/DL (ref 74–99)
GLUCOSE BLD-MCNC: 184 MG/DL (ref 74–99)
GRAM STAIN ORDERABLE: NORMAL
HCT VFR BLD CALC: 43.8 % (ref 37–54)
HEMOGLOBIN: 14.1 G/DL (ref 12.5–16.5)
MCH RBC QN AUTO: 28.2 PG (ref 26–35)
MCHC RBC AUTO-ENTMCNC: 32.2 % (ref 32–34.5)
MCV RBC AUTO: 87.6 FL (ref 80–99.9)
METER GLUCOSE: 183 MG/DL (ref 74–99)
METER GLUCOSE: 194 MG/DL (ref 74–99)
METER GLUCOSE: 202 MG/DL (ref 74–99)
PDW BLD-RTO: 12.9 FL (ref 11.5–15)
PLATELET # BLD: 256 E9/L (ref 130–450)
PMV BLD AUTO: 9.5 FL (ref 7–12)
POTASSIUM SERPL-SCNC: 3.7 MMOL/L (ref 3.5–5)
POTASSIUM SERPL-SCNC: 3.8 MMOL/L (ref 3.5–5)
POTASSIUM SERPL-SCNC: 4.1 MMOL/L (ref 3.5–5)
RBC # BLD: 5 E12/L (ref 3.8–5.8)
SODIUM BLD-SCNC: 139 MMOL/L (ref 132–146)
SODIUM BLD-SCNC: 140 MMOL/L (ref 132–146)
SODIUM BLD-SCNC: 140 MMOL/L (ref 132–146)
VANCOMYCIN TROUGH: 26.4 MCG/ML (ref 5–16)
WBC # BLD: 6.9 E9/L (ref 4.5–11.5)

## 2019-10-23 PROCEDURE — 36415 COLL VENOUS BLD VENIPUNCTURE: CPT

## 2019-10-23 PROCEDURE — 6360000002 HC RX W HCPCS: Performed by: NURSE PRACTITIONER

## 2019-10-23 PROCEDURE — 99239 HOSP IP/OBS DSCHRG MGMT >30: CPT | Performed by: INTERNAL MEDICINE

## 2019-10-23 PROCEDURE — 2580000003 HC RX 258: Performed by: NURSE PRACTITIONER

## 2019-10-23 PROCEDURE — 6370000000 HC RX 637 (ALT 250 FOR IP): Performed by: NURSE PRACTITIONER

## 2019-10-23 PROCEDURE — 85027 COMPLETE CBC AUTOMATED: CPT

## 2019-10-23 PROCEDURE — 82962 GLUCOSE BLOOD TEST: CPT

## 2019-10-23 PROCEDURE — 6370000000 HC RX 637 (ALT 250 FOR IP): Performed by: SPECIALIST

## 2019-10-23 PROCEDURE — 2580000003 HC RX 258: Performed by: INTERNAL MEDICINE

## 2019-10-23 PROCEDURE — APPSS45 APP SPLIT SHARED TIME 31-45 MINUTES: Performed by: NURSE PRACTITIONER

## 2019-10-23 PROCEDURE — 80202 ASSAY OF VANCOMYCIN: CPT

## 2019-10-23 PROCEDURE — 80048 BASIC METABOLIC PNL TOTAL CA: CPT

## 2019-10-23 RX ORDER — CIPROFLOXACIN 500 MG/1
500 TABLET, FILM COATED ORAL EVERY 12 HOURS SCHEDULED
Qty: 28 TABLET | Refills: 0 | Status: SHIPPED | OUTPATIENT
Start: 2019-10-23 | End: 2019-11-06

## 2019-10-23 RX ORDER — LINEZOLID 600 MG/1
600 TABLET, FILM COATED ORAL EVERY 12 HOURS SCHEDULED
Qty: 28 TABLET | Refills: 0 | Status: SHIPPED | OUTPATIENT
Start: 2019-10-23 | End: 2019-11-06

## 2019-10-23 RX ORDER — CIPROFLOXACIN 500 MG/1
500 TABLET, FILM COATED ORAL EVERY 12 HOURS SCHEDULED
Status: DISCONTINUED | OUTPATIENT
Start: 2019-10-23 | End: 2019-10-24 | Stop reason: HOSPADM

## 2019-10-23 RX ORDER — SODIUM CHLORIDE 9 MG/ML
INJECTION, SOLUTION INTRAVENOUS CONTINUOUS
Status: DISCONTINUED | OUTPATIENT
Start: 2019-10-23 | End: 2019-10-24 | Stop reason: HOSPADM

## 2019-10-23 RX ORDER — LINEZOLID 600 MG/1
600 TABLET, FILM COATED ORAL EVERY 12 HOURS SCHEDULED
Status: DISCONTINUED | OUTPATIENT
Start: 2019-10-23 | End: 2019-10-24 | Stop reason: HOSPADM

## 2019-10-23 RX ADMIN — INSULIN GLARGINE 30 UNITS: 100 INJECTION, SOLUTION SUBCUTANEOUS at 08:30

## 2019-10-23 RX ADMIN — LINEZOLID 600 MG: 600 TABLET, FILM COATED ORAL at 12:06

## 2019-10-23 RX ADMIN — ATORVASTATIN CALCIUM 40 MG: 40 TABLET, FILM COATED ORAL at 08:18

## 2019-10-23 RX ADMIN — PIPERACILLIN SODIUM AND TAZOBACTAM SODIUM 3.38 G: 3; .375 INJECTION, POWDER, LYOPHILIZED, FOR SOLUTION INTRAVENOUS at 05:30

## 2019-10-23 RX ADMIN — NYSTATIN 500000 UNITS: 100000 SUSPENSION ORAL at 12:06

## 2019-10-23 RX ADMIN — INSULIN LISPRO 2 UNITS: 100 INJECTION, SOLUTION INTRAVENOUS; SUBCUTANEOUS at 17:30

## 2019-10-23 RX ADMIN — NYSTATIN 500000 UNITS: 100000 SUSPENSION ORAL at 08:18

## 2019-10-23 RX ADMIN — SODIUM CHLORIDE: 9 INJECTION, SOLUTION INTRAVENOUS at 14:08

## 2019-10-23 RX ADMIN — INSULIN LISPRO 2 UNITS: 100 INJECTION, SOLUTION INTRAVENOUS; SUBCUTANEOUS at 08:28

## 2019-10-23 RX ADMIN — CIPROFLOXACIN 500 MG: 500 TABLET, FILM COATED ORAL at 12:06

## 2019-10-23 RX ADMIN — INSULIN LISPRO 2 UNITS: 100 INJECTION, SOLUTION INTRAVENOUS; SUBCUTANEOUS at 12:12

## 2019-10-23 RX ADMIN — ENOXAPARIN SODIUM 40 MG: 40 INJECTION SUBCUTANEOUS at 08:19

## 2019-10-23 RX ADMIN — CETIRIZINE HYDROCHLORIDE 10 MG: 10 TABLET, FILM COATED ORAL at 08:18

## 2019-10-23 ASSESSMENT — PAIN SCALES - GENERAL
PAINLEVEL_OUTOF10: 0

## 2019-10-24 LAB — CULTURE SURGICAL: NORMAL

## 2019-10-27 LAB — ANAEROBIC CULTURE: NORMAL

## 2020-01-27 LAB
AVERAGE GLUCOSE: NORMAL
HBA1C MFR BLD: 10.2 %

## 2020-05-04 LAB
AVERAGE GLUCOSE: NORMAL
AVERAGE GLUCOSE: NORMAL
HBA1C MFR BLD: 11.5 %
HBA1C MFR BLD: 11.5 %

## 2021-07-26 LAB
AVERAGE GLUCOSE: NORMAL
HBA1C MFR BLD: 13 %

## 2021-11-23 LAB
AVERAGE GLUCOSE: NORMAL
AVERAGE GLUCOSE: NORMAL
HBA1C MFR BLD: 13 %
HBA1C MFR BLD: 13 %

## 2022-02-14 VITALS
DIASTOLIC BLOOD PRESSURE: 60 MMHG | HEIGHT: 74 IN | HEART RATE: 89 BPM | TEMPERATURE: 98.3 F | OXYGEN SATURATION: 96 % | BODY MASS INDEX: 40.43 KG/M2 | SYSTOLIC BLOOD PRESSURE: 122 MMHG | WEIGHT: 315 LBS

## 2022-02-20 ENCOUNTER — HOSPITAL ENCOUNTER (EMERGENCY)
Age: 57
Discharge: HOME | End: 2022-02-20
Attending: EMERGENCY MEDICINE
Payer: COMMERCIAL

## 2022-02-20 VITALS
RESPIRATION RATE: 18 BRPM | TEMPERATURE: 99.2 F | DIASTOLIC BLOOD PRESSURE: 80 MMHG | SYSTOLIC BLOOD PRESSURE: 144 MMHG | OXYGEN SATURATION: 97 % | BODY MASS INDEX: 42.11 KG/M2 | WEIGHT: 315 LBS | HEART RATE: 116 BPM

## 2022-02-20 DIAGNOSIS — J01.90 ACUTE SINUSITIS, RECURRENCE NOT SPECIFIED, UNSPECIFIED LOCATION: Primary | ICD-10-CM

## 2022-02-20 DIAGNOSIS — R05.9 COUGH: ICD-10-CM

## 2022-02-20 PROCEDURE — 99283 EMERGENCY DEPT VISIT LOW MDM: CPT

## 2022-02-20 PROCEDURE — 6370000000 HC RX 637 (ALT 250 FOR IP): Performed by: EMERGENCY MEDICINE

## 2022-02-20 RX ORDER — LEVOFLOXACIN 500 MG/1
500 TABLET, FILM COATED ORAL DAILY
Status: DISCONTINUED | OUTPATIENT
Start: 2022-02-20 | End: 2022-02-20 | Stop reason: HOSPADM

## 2022-02-20 RX ORDER — LEVOFLOXACIN 500 MG/1
500 TABLET, FILM COATED ORAL DAILY
Qty: 7 TABLET | Refills: 0 | Status: SHIPPED | OUTPATIENT
Start: 2022-02-20 | End: 2022-02-27

## 2022-02-20 RX ADMIN — LEVOFLOXACIN 500 MG: 500 TABLET, FILM COATED ORAL at 10:49

## 2022-02-20 NOTE — ED PROVIDER NOTES
[address] [facility]  Department of Emergency Medicine   ED  Encounter Note  Admit Date/RoomTime: 2022 10:09 AM  ED Room:     NAME: Karin Dawkins  : 1965  MRN: [id number]     Chief Complaint:  Cough (cough and facial congestion increased nasal drainage for about a month)    History of Present Illness       Karin Dawkins is a 64 y.o. old male who presents to the emergency department by private vehicle, for nasal congestion, rhinorrhea and cough, which began 4 week(s) prior to arrival.  Since onset the symptoms have been persistent and moderate in severity. The symptoms are associated with no additional symptoms as it relates to today's visit. There has been no additional symptoms as it relates to today's visit. The patient has been fully vaccinated against COVID-19. He reports symptoms are worse at night, has been using humidifier, has not recently been on antibiotics. Cough is nonproductive but he is started to have purulent nasal drainage in the last several days. Feels pressure behind his eyes as well as in the left greater than right frontal area. States blood sugar was 140 this morning    ROS   Pertinent positives and negatives are stated within HPI, all other systems reviewed and are negative. Past Medical History:  has a past medical history of Diabetes mellitus ([address] Gangrene of toe of left foot ([address] Hyperlipidemia, Hyperlipidemia, Sleep apnea, and Vitamin D deficiency. Surgical History:  has a past surgical history that includes other surgical history (Left, 2017); Toe amputation; and Toe amputation (Left, 10/21/2019). Social History:  reports that he has never smoked. He has never used smokeless tobacco. He reports that he does not drink alcohol and does not use drugs. Family History: family history includes Atrial Fibrillation in his mother; Cancer in his father and mother; Depression in his father, maternal grandmother, and mother. Allergies: Patient has no known allergies. Physical Exam   Oxygen Saturation Interpretation: Normal on room air analysis. ED Triage Vitals [02/20/22 1022]   BP Temp Temp Source Pulse Resp SpO2 Height Weight   (!) 144/80 99.2 °F (37.3 °C) Oral 116 18 97 % -- (!) 328 lb (148.8 kg)         · Constitutional:  Alert, development consistent with age. · Nose:   There is purulent rhinorrhea. · Sinuses: moderate Left maxillary sinus tenderness. mild Bilateral frontal sinus tenderness. · Mouth:  normal tongue and buccal mucosa. · Throat: no erythema or exudates noted. Teeth and gums normal..  Airway Patent. · Neck:  Supple. No meningeal signs. There is no  anterior cervical node tenderness. · Respiratory:   Breath sounds: Bilateral normal.  Lung sounds: normal.   · CV:  Regular rate and rhythm, normal heart sounds, without pathological murmurs, ectopy, gallops, or rubs. · GI:  Abdomen Soft, nontender, good bowel sounds. No firm or pulsatile mass. · Integument:  Normal turgor. Warm, dry, without visible rash. · Neurological:  Oriented. Motor functions intact. Lab / Imaging Results   (All laboratory and radiology results have been personally reviewed by myself)  Labs:  No results found for this visit on 02/20/22. Imaging: All Radiology results interpreted by Radiologist unless otherwise noted. No orders to display       ED Course / Medical Decision Making     Medications   levoFLOXacin (LEVAQUIN) tablet 500 mg (has no administration in time range)             Medical Decision Making:   Patient with greater than 2 weeks of sinus congestion, will initiate treatment for bacterial sinusitis. Lungs clear to auscultation blood sugar was 140 this morning. Will will have follow-up with primary care return for worsening signs or symptoms    Assessment     1. Acute sinusitis, recurrence not specified, unspecified location    2. Cough      Plan   Discharged home.   Patient condition is stable    New Medications     New Prescriptions    LEVOFLOXACIN (LEVAQUIN) 500 MG TABLET    Take 1 tablet by mouth daily for 7 days     Electronically signed by Coretta Strong DO   DD: 2/20/22  **This report was transcribed using voice recognition software. Every effort was made to ensure accuracy; however, inadvertent computerized transcription errors may be present.   END OF ED PROVIDER NOTE         Coretta Strong DO  02/20/22 1042

## 2022-03-05 ENCOUNTER — HOSPITAL ENCOUNTER (EMERGENCY)
Age: 57
Discharge: ANOTHER HOSPITAL | End: 2022-03-06
Attending: STUDENT IN AN ORGANIZED HEALTH CARE EDUCATION/TRAINING PROGRAM
Payer: COMMERCIAL

## 2022-03-05 ENCOUNTER — APPOINTMENT (OUTPATIENT)
Dept: CT IMAGING | Age: 57
End: 2022-03-05
Payer: COMMERCIAL

## 2022-03-05 ENCOUNTER — APPOINTMENT (OUTPATIENT)
Dept: GENERAL RADIOLOGY | Age: 57
End: 2022-03-05
Payer: COMMERCIAL

## 2022-03-05 VITALS
HEART RATE: 102 BPM | RESPIRATION RATE: 28 BRPM | BODY MASS INDEX: 41.21 KG/M2 | TEMPERATURE: 99.7 F | WEIGHT: 315 LBS | OXYGEN SATURATION: 95 % | SYSTOLIC BLOOD PRESSURE: 122 MMHG | DIASTOLIC BLOOD PRESSURE: 74 MMHG

## 2022-03-05 DIAGNOSIS — K65.1 ABDOMINOPELVIC ABSCESS (HCC): Primary | ICD-10-CM

## 2022-03-05 DIAGNOSIS — R73.9 HYPERGLYCEMIA: ICD-10-CM

## 2022-03-05 LAB
ALBUMIN SERPL-MCNC: 2.8 G/DL (ref 3.5–5.2)
ALP BLD-CCNC: 127 U/L (ref 40–129)
ALT SERPL-CCNC: 18 U/L (ref 0–40)
AMORPHOUS: ABNORMAL
ANION GAP SERPL CALCULATED.3IONS-SCNC: 16 MMOL/L (ref 7–16)
AST SERPL-CCNC: 25 U/L (ref 0–39)
BACTERIA: ABNORMAL /HPF
BASOPHILS ABSOLUTE: 0.08 E9/L (ref 0–0.2)
BASOPHILS RELATIVE PERCENT: 0.4 % (ref 0–2)
BETA-HYDROXYBUTYRATE: 1.3 MMOL/L (ref 0.02–0.27)
BILIRUB SERPL-MCNC: 0.9 MG/DL (ref 0–1.2)
BILIRUBIN URINE: ABNORMAL
BLOOD, URINE: ABNORMAL
BUN BLDV-MCNC: 10 MG/DL (ref 6–20)
CALCIUM SERPL-MCNC: 9 MG/DL (ref 8.6–10.2)
CHLORIDE BLD-SCNC: 90 MMOL/L (ref 98–107)
CHP ED QC CHECK: YES
CHP ED QC CHECK: YES
CLARITY: CLEAR
CO2: 18 MMOL/L (ref 22–29)
COLOR: YELLOW
CREAT SERPL-MCNC: 0.8 MG/DL (ref 0.7–1.2)
EOSINOPHILS ABSOLUTE: 0 E9/L (ref 0.05–0.5)
EOSINOPHILS RELATIVE PERCENT: 0 % (ref 0–6)
EPITHELIAL CELLS, UA: ABNORMAL /HPF
FINE CASTS, UA: ABNORMAL /LPF (ref 0–2)
GFR AFRICAN AMERICAN: >60
GFR NON-AFRICAN AMERICAN: >60 ML/MIN/1.73
GLUCOSE BLD-MCNC: 166 MG/DL
GLUCOSE BLD-MCNC: 303 MG/DL
GLUCOSE BLD-MCNC: 307 MG/DL (ref 74–99)
GLUCOSE URINE: 500 MG/DL
HCT VFR BLD CALC: 41.3 % (ref 37–54)
HEMOGLOBIN: 14.1 G/DL (ref 12.5–16.5)
IMMATURE GRANULOCYTES #: 0.21 E9/L
IMMATURE GRANULOCYTES %: 1.1 % (ref 0–5)
KETONES, URINE: >=80 MG/DL
LACTIC ACID: 1.5 MMOL/L (ref 0.5–2.2)
LEUKOCYTE ESTERASE, URINE: NEGATIVE
LYMPHOCYTES ABSOLUTE: 1.31 E9/L (ref 1.5–4)
LYMPHOCYTES RELATIVE PERCENT: 6.8 % (ref 20–42)
MCH RBC QN AUTO: 27.8 PG (ref 26–35)
MCHC RBC AUTO-ENTMCNC: 34.1 % (ref 32–34.5)
MCV RBC AUTO: 81.5 FL (ref 80–99.9)
METER GLUCOSE: 166 MG/DL (ref 74–99)
METER GLUCOSE: 303 MG/DL (ref 74–99)
MONOCYTES ABSOLUTE: 1.52 E9/L (ref 0.1–0.95)
MONOCYTES RELATIVE PERCENT: 7.9 % (ref 2–12)
NEUTROPHILS ABSOLUTE: 16.03 E9/L (ref 1.8–7.3)
NEUTROPHILS RELATIVE PERCENT: 83.8 % (ref 43–80)
NITRITE, URINE: NEGATIVE
PDW BLD-RTO: 13.1 FL (ref 11.5–15)
PH UA: 6 (ref 5–9)
PH VENOUS: 7.47 (ref 7.35–7.45)
PLATELET # BLD: 400 E9/L (ref 130–450)
PMV BLD AUTO: 9.8 FL (ref 7–12)
POTASSIUM REFLEX MAGNESIUM: 4.2 MMOL/L (ref 3.5–5)
PROTEIN UA: 30 MG/DL
RBC # BLD: 5.07 E12/L (ref 3.8–5.8)
RBC UA: ABNORMAL /HPF (ref 0–2)
SODIUM BLD-SCNC: 124 MMOL/L (ref 132–146)
SPECIFIC GRAVITY UA: 1.02 (ref 1–1.03)
TOTAL PROTEIN: 7.8 G/DL (ref 6.4–8.3)
UROBILINOGEN, URINE: 1 E.U./DL
WBC # BLD: 19.2 E9/L (ref 4.5–11.5)
WBC UA: ABNORMAL /HPF (ref 0–5)

## 2022-03-05 PROCEDURE — 6360000004 HC RX CONTRAST MEDICATION: Performed by: RADIOLOGY

## 2022-03-05 PROCEDURE — 83605 ASSAY OF LACTIC ACID: CPT

## 2022-03-05 PROCEDURE — 93005 ELECTROCARDIOGRAM TRACING: CPT | Performed by: STUDENT IN AN ORGANIZED HEALTH CARE EDUCATION/TRAINING PROGRAM

## 2022-03-05 PROCEDURE — 96365 THER/PROPH/DIAG IV INF INIT: CPT

## 2022-03-05 PROCEDURE — 71045 X-RAY EXAM CHEST 1 VIEW: CPT

## 2022-03-05 PROCEDURE — 82962 GLUCOSE BLOOD TEST: CPT

## 2022-03-05 PROCEDURE — 2580000003 HC RX 258: Performed by: STUDENT IN AN ORGANIZED HEALTH CARE EDUCATION/TRAINING PROGRAM

## 2022-03-05 PROCEDURE — 85025 COMPLETE CBC W/AUTO DIFF WBC: CPT

## 2022-03-05 PROCEDURE — 6360000002 HC RX W HCPCS: Performed by: STUDENT IN AN ORGANIZED HEALTH CARE EDUCATION/TRAINING PROGRAM

## 2022-03-05 PROCEDURE — 81001 URINALYSIS AUTO W/SCOPE: CPT

## 2022-03-05 PROCEDURE — 82800 BLOOD PH: CPT

## 2022-03-05 PROCEDURE — 99284 EMERGENCY DEPT VISIT MOD MDM: CPT

## 2022-03-05 PROCEDURE — 82010 KETONE BODYS QUAN: CPT

## 2022-03-05 PROCEDURE — 87088 URINE BACTERIA CULTURE: CPT

## 2022-03-05 PROCEDURE — 80053 COMPREHEN METABOLIC PANEL: CPT

## 2022-03-05 PROCEDURE — 36415 COLL VENOUS BLD VENIPUNCTURE: CPT

## 2022-03-05 PROCEDURE — 74177 CT ABD & PELVIS W/CONTRAST: CPT

## 2022-03-05 RX ORDER — SODIUM CHLORIDE 9 MG/ML
INJECTION, SOLUTION INTRAVENOUS CONTINUOUS
Status: DISCONTINUED | OUTPATIENT
Start: 2022-03-05 | End: 2022-03-06 | Stop reason: HOSPADM

## 2022-03-05 RX ORDER — 0.9 % SODIUM CHLORIDE 0.9 %
1000 INTRAVENOUS SOLUTION INTRAVENOUS ONCE
Status: COMPLETED | OUTPATIENT
Start: 2022-03-05 | End: 2022-03-05

## 2022-03-05 RX ADMIN — SODIUM CHLORIDE 1000 ML: 9 INJECTION, SOLUTION INTRAVENOUS at 16:25

## 2022-03-05 RX ADMIN — IOPAMIDOL 75 ML: 755 INJECTION, SOLUTION INTRAVENOUS at 17:41

## 2022-03-05 RX ADMIN — SODIUM CHLORIDE: 9 INJECTION, SOLUTION INTRAVENOUS at 19:18

## 2022-03-05 RX ADMIN — PIPERACILLIN AND TAZOBACTAM 4500 MG: 4; .5 INJECTION, POWDER, FOR SOLUTION INTRAVENOUS at 19:14

## 2022-03-05 ASSESSMENT — ENCOUNTER SYMPTOMS
SORE THROAT: 0
DIARRHEA: 0
COUGH: 1
SHORTNESS OF BREATH: 0
BACK PAIN: 0
NAUSEA: 0
ABDOMINAL PAIN: 1
SINUS PRESSURE: 0
VOMITING: 0
WHEEZING: 0

## 2022-03-05 NOTE — ED PROVIDER NOTES
Chief Complaint   Patient presents with    URI     cough, just got over sinus issue    Urinary Tract Infection     believes he is dehydrated, no burning or pain        Patient is a 68-year-old male presents today for cough and concern of urinary infection. He states he was seen in urgent care about 1 week ago, at that time he was diagnosed with sinusitis and discharged home with antibiotics. He completed his antibiotic course and states that the sinus pressure and drainage has resolved however he does still have a dry nonproductive cough and he is now concerned that he may be dehydrated have a urinary infection. He states he has been urinating more frequently and his urine is more dark than usual.  He denies dysuria or hematuria. Patient also endorses intermittent right lower quadrant cramping has been ongoing for the past several days. No history of previous abdominal surgeries. He endorses a dry nonproductive cough. He denies chest pain or shortness of breath. Denies fevers or chills. Denies lightheadedness or dizziness. Denies nausea, vomiting or diarrhea. The history is provided by the patient. No  was used. Review of Systems   Constitutional: Negative for chills and fever. HENT: Negative for sinus pressure and sore throat. Respiratory: Positive for cough. Negative for shortness of breath and wheezing. Cardiovascular: Negative for chest pain, palpitations and leg swelling. Gastrointestinal: Positive for abdominal pain. Negative for diarrhea, nausea and vomiting. Genitourinary: Positive for frequency. Negative for dysuria, hematuria, scrotal swelling and testicular pain. Musculoskeletal: Negative for arthralgias and back pain. Skin: Negative for rash and wound. Neurological: Negative for dizziness, syncope, weakness and headaches. Hematological: Negative for adenopathy. Psychiatric/Behavioral: Negative for confusion.    All other systems reviewed and are negative. Physical Exam  Vitals and nursing note reviewed. Constitutional:       Appearance: He is well-developed. He is obese. HENT:      Head: Normocephalic and atraumatic. Eyes:      Pupils: Pupils are equal, round, and reactive to light. Cardiovascular:      Rate and Rhythm: Normal rate and regular rhythm. Heart sounds: Normal heart sounds. No murmur heard. Pulmonary:      Effort: Pulmonary effort is normal. No respiratory distress. Breath sounds: Normal breath sounds. No wheezing or rales. Abdominal:      General: Bowel sounds are normal.      Palpations: Abdomen is soft. Tenderness: There is abdominal tenderness. There is no guarding or rebound. Comments: Mild tenderness palpation of the right lower quadrant, abdomen is soft, no guarding rebound   Musculoskeletal:      Cervical back: Normal range of motion and neck supple. Skin:     General: Skin is warm and dry. Neurological:      Mental Status: He is alert and oriented to person, place, and time. Cranial Nerves: No cranial nerve deficit.       Coordination: Coordination normal.          Procedures     Labs Reviewed   CBC WITH AUTO DIFFERENTIAL - Abnormal; Notable for the following components:       Result Value    WBC 19.2 (*)     Neutrophils % 83.8 (*)     Lymphocytes % 6.8 (*)     Neutrophils Absolute 16.03 (*)     Lymphocytes Absolute 1.31 (*)     Monocytes Absolute 1.52 (*)     Eosinophils Absolute 0.00 (*)     All other components within normal limits   COMPREHENSIVE METABOLIC PANEL W/ REFLEX TO MG FOR LOW K - Abnormal; Notable for the following components:    Sodium 124 (*)     Chloride 90 (*)     CO2 18 (*)     Glucose 307 (*)     Albumin 2.8 (*)     All other components within normal limits   URINALYSIS WITH MICROSCOPIC - Abnormal; Notable for the following components:    Glucose, Ur 500 (*)     Bilirubin Urine SMALL (*)     Ketones, Urine >=80 (*)     Blood, Urine MODERATE (*)     Protein, UA 30 (*)     Fine Casts, UA 3-5 (*)     RBC, UA 5-10 (*)     Bacteria, UA MODERATE (*)     All other components within normal limits   PH, VENOUS - Abnormal; Notable for the following components:    pH, Phillip 7.47 (*)     All other components within normal limits   BETA-HYDROXYBUTYRATE - Abnormal; Notable for the following components:    Beta-Hydroxybutyrate 1.30 (*)     All other components within normal limits   POCT GLUCOSE - Abnormal; Notable for the following components:    Meter Glucose 303 (*)     All other components within normal limits   POCT GLUCOSE - Abnormal; Notable for the following components:    Meter Glucose 166 (*)     All other components within normal limits   POCT GLUCOSE - Normal   POCT GLUCOSE - Normal   CULTURE, URINE   LACTIC ACID     CT ABDOMEN PELVIS W IV CONTRAST Additional Contrast? None   Final Result   10.0 cm x 9.0 cm or walled-off fluid collection involving the right lower   quadrant in the appendiceal region worrisome for perforated appendix with   associated abscess. The appendix is not visualized. Findings discussed on 03/05/2022 with Dr. Kevin Lacey at 7:02 p.m. XR CHEST PORTABLE   Final Result   No acute process. EKG #1:   I personally interpreted this EKG  Time:  1629    Rate: 119  Rhythm: Sinus. Interpretation: Sinus tachycardia, normal axis, no ST elevation. MDM  Number of Diagnoses or Management Options  Abdominopelvic abscess ([address]  Hyperglycemia  Diagnosis management comments: Patient is a 78-year-old male presents today for concern of urinary infection and intermittent right lower quadrant abdominal pain. On physical exam he has mild tenderness palpation of this area. He was noted to be tachycardic on arrival.  He does have a history of diabetes for which he is on medication for. Labs and imaging were obtained.   Lab work pertinent for white count of 19, hyperglycemia with a blood glucose of 307, however no evidence of DKA as anion gap is 16, bicarb 18, venous pH 7.47. CT the abdomen was obtained which shows a 10 x 9 cm walled off fluid collection involving the right lower quadrant in the appendiceal region concerning for possible perforated appendix with abscess formation as appendix is poorly visualized per radiology. I did give patient IV Zosyn in the department. I did speak with on-call for general surgery at [facility] as this is where patient would like to be transferred to. I spoke with surgery Dr. Jeb Cristobal discussed case. He accepts patient is transfer to his surgical service for further evaluation and treatment. Patient is remained stable. Amount and/or Complexity of Data Reviewed  Clinical lab tests: reviewed  Tests in the radiology section of CPT®: reviewed  Tests in the medicine section of CPT®: reviewed             ED Course as of 03/05/22 2158   Sat Mar 05, 2022   1739 Sodium(!): 124  Corrected sodium for hyperglycemia, 127 [JH]   1956 I spoke with Dr Jeb Cristobal at [facility] and discussed case. He accepts transfer and admission. [JH]      ED Course User Index  [JH] Real Rivas, DO       --------------------------------------------- PAST HISTORY ---------------------------------------------  Past Medical History:  has a past medical history of Diabetes mellitus ([facility] [address] Gangrene of toe of left foot ([facility] [address] Hyperlipidemia, Hyperlipidemia, Sleep apnea, and Vitamin D deficiency. Past Surgical History:  has a past surgical history that includes other surgical history (Left, 11/17/2017); Toe amputation; and Toe amputation (Left, 10/21/2019). Social History:  reports that he has never smoked. He has never used smokeless tobacco. He reports that he does not drink alcohol and does not use drugs. Family History: family history includes Atrial Fibrillation in his mother; Cancer in his father and mother; Depression in his father, maternal grandmother, and mother.      The patients home medications have been reviewed. Allergies: Patient has no known allergies.     -------------------------------------------------- RESULTS -------------------------------------------------    LABS:  Results for orders placed or performed during the hospital encounter of 03/05/22   CBC with Auto Differential   Result Value Ref Range    WBC 19.2 (H) 4.5 - 11.5 E9/L    RBC 5.07 3.80 - 5.80 E12/L    Hemoglobin 14.1 12.5 - 16.5 g/dL    Hematocrit 41.3 37.0 - 54.0 %    MCV 81.5 80.0 - 99.9 fL    MCH 27.8 26.0 - 35.0 pg    MCHC 34.1 32.0 - 34.5 %    RDW 13.1 11.5 - 15.0 fL    Platelets 815 000 - 948 E9/L    MPV 9.8 7.0 - 12.0 fL    Neutrophils % 83.8 (H) 43.0 - 80.0 %    Immature Granulocytes % 1.1 0.0 - 5.0 %    Lymphocytes % 6.8 (L) 20.0 - 42.0 %    Monocytes % 7.9 2.0 - 12.0 %    Eosinophils % 0.0 0.0 - 6.0 %    Basophils % 0.4 0.0 - 2.0 %    Neutrophils Absolute 16.03 (H) 1.80 - 7.30 E9/L    Immature Granulocytes # 0.21 E9/L    Lymphocytes Absolute 1.31 (L) 1.50 - 4.00 E9/L    Monocytes Absolute 1.52 (H) 0.10 - 0.95 E9/L    Eosinophils Absolute 0.00 (L) 0.05 - 0.50 E9/L    Basophils Absolute 0.08 0.00 - 0.20 E9/L   Comprehensive Metabolic Panel w/ Reflex to MG   Result Value Ref Range    Sodium 124 (L) 132 - 146 mmol/L    Potassium reflex Magnesium 4.2 3.5 - 5.0 mmol/L    Chloride 90 (L) 98 - 107 mmol/L    CO2 18 (L) 22 - 29 mmol/L    Anion Gap 16 7 - 16 mmol/L    Glucose 307 (H) 74 - 99 mg/dL    BUN 10 6 - 20 mg/dL    CREATININE 0.8 0.7 - 1.2 mg/dL    GFR Non-African American >60 >=60 mL/min/1.73    GFR African American >60     Calcium 9.0 8.6 - 10.2 mg/dL    Total Protein 7.8 6.4 - 8.3 g/dL    Albumin 2.8 (L) 3.5 - 5.2 g/dL    Total Bilirubin 0.9 0.0 - 1.2 mg/dL    Alkaline Phosphatase 127 40 - 129 U/L    ALT 18 0 - 40 U/L    AST 25 0 - 39 U/L   Urinalysis with Microscopic   Result Value Ref Range    Color, UA Yellow Straw/Yellow    Clarity, UA Clear Clear    Glucose, Ur 500 (A) Negative mg/dL    Bilirubin Urine SMALL (A) Negative Ketones, Urine >=80 (A) Negative mg/dL    Specific Gravity, UA 1.020 1.005 - 1.030    Blood, Urine MODERATE (A) Negative    pH, UA 6.0 5.0 - 9.0    Protein, UA 30 (A) Negative mg/dL    Urobilinogen, Urine 1.0 <2.0 E.U./dL    Nitrite, Urine Negative Negative    Leukocyte Esterase, Urine Negative Negative    Fine Casts, UA 3-5 (A) 0 - 2 /LPF    WBC, UA 1-3 0 - 5 /HPF    RBC, UA 5-10 (A) 0 - 2 /HPF    Epithelial Cells, UA FEW /HPF    Bacteria, UA MODERATE (A) None Seen /HPF    Amorphous, UA FEW    PH, VENOUS   Result Value Ref Range    pH, Phillip 7.47 (H) 7.35 - 7.45   Beta-Hydroxybutyrate   Result Value Ref Range    Beta-Hydroxybutyrate 1.30 (H) 0.02 - 0.27 mmol/L   Lactic Acid   Result Value Ref Range    Lactic Acid 1.5 0.5 - 2.2 mmol/L   POCT Glucose   Result Value Ref Range    Glucose 303 mg/dL    QC OK? yes    POCT Glucose   Result Value Ref Range    Meter Glucose 303 (H) 74 - 99 mg/dL   POCT Glucose   Result Value Ref Range    Glucose 166 mg/dL    QC OK? yes    POCT Glucose   Result Value Ref Range    Meter Glucose 166 (H) 74 - 99 mg/dL   EKG 12 Lead   Result Value Ref Range    Ventricular Rate 119 BPM    Atrial Rate 119 BPM    P-R Interval 170 ms    QRS Duration 76 ms    Q-T Interval 312 ms    QTc Calculation (Bazett) 438 ms    P Axis 38 degrees    R Axis 5 degrees    T Axis 58 degrees       RADIOLOGY:  CT ABDOMEN PELVIS W IV CONTRAST Additional Contrast? None   Final Result   10.0 cm x 9.0 cm or walled-off fluid collection involving the right lower   quadrant in the appendiceal region worrisome for perforated appendix with   associated abscess. The appendix is not visualized. Findings discussed on 03/05/2022 with Dr. Oralia Saba at 7:02 p.m. XR CHEST PORTABLE   Final Result   No acute process.                  ------------------------- NURSING NOTES AND VITALS REVIEWED ---------------------------  Date / Time Roomed:  3/5/2022  3:53 PM  ED Bed Assignment:  12/12    The nursing notes within the ED encounter and vital signs as below have been reviewed. Patient Vitals for the past 24 hrs:   BP Temp Temp src Pulse Resp SpO2 Weight   03/05/22 2051 123/65 99.3 °F (37.4 °C)  100 16     03/05/22 1849    104      03/05/22 1707    114      03/05/22 1550       (!) 321 lb (145.6 kg)   03/05/22 1535 127/77 98 °F (36.7 °C) Oral 125 16 95 %        Oxygen Saturation Interpretation: Normal    ------------------------------------------ PROGRESS NOTES ------------------------------------------    Counseling:  I have spoken with the patient and discussed todays results, in addition to providing specific details for the plan of care and counseling regarding the diagnosis and prognosis. Their questions are answered at this time and they are agreeable with the plan of admission.    --------------------------------- ADDITIONAL PROVIDER NOTES ---------------------------------  Consultations:  Spoke with Dr. Fariba Bynum ([facility]). Discussed case. They will admit the patient. This patient's ED course included: a personal history and physicial examination    This patient has remained hemodynamically stable during their ED course. Medications   0.9 % sodium chloride infusion ( IntraVENous New Bag 3/5/22 1918)   0.9 % sodium chloride bolus (0 mLs IntraVENous Stopped 3/5/22 1823)   iopamidol (ISOVUE-370) 76 % injection 75 mL (75 mLs IntraVENous Given 3/5/22 1741)   piperacillin-tazobactam (ZOSYN) 4,500 mg in sodium chloride 0.9 % 100 mL IVPB (mini-bag) (0 mg IntraVENous Stopped 3/5/22 1953)         Diagnosis:  1. Abdominopelvic abscess ([address]    2. Hyperglycemia        Disposition:  Patient's disposition: Transfer to [address] [place] [address]  Patient's condition is stable.              Pushpa Lema DO  Resident  03/05/22 4730

## 2022-03-06 ENCOUNTER — HOSPITAL ENCOUNTER (INPATIENT)
Age: 57
LOS: 5 days | Discharge: HOME HEALTH | DRG: 248 | End: 2022-03-11
Attending: SURGERY | Admitting: SURGERY
Payer: COMMERCIAL

## 2022-03-06 LAB
ANION GAP SERPL CALCULATED.3IONS-SCNC: 12 MMOL/L (ref 7–16)
APTT: 25.7 SEC (ref 24.5–35.1)
BASOPHILS ABSOLUTE: 0 E9/L (ref 0–0.2)
BASOPHILS RELATIVE PERCENT: 0 % (ref 0–2)
BUN BLDV-MCNC: 9 MG/DL (ref 6–20)
CALCIUM SERPL-MCNC: 8.2 MG/DL (ref 8.6–10.2)
CEA: 1.5 NG/ML (ref 0–5.2)
CHLORIDE BLD-SCNC: 95 MMOL/L (ref 98–107)
CO2: 21 MMOL/L (ref 22–29)
CREAT SERPL-MCNC: 0.7 MG/DL (ref 0.7–1.2)
EOSINOPHILS ABSOLUTE: 0.18 E9/L (ref 0.05–0.5)
EOSINOPHILS RELATIVE PERCENT: 1 % (ref 0–6)
GFR AFRICAN AMERICAN: >60
GFR NON-AFRICAN AMERICAN: >60 ML/MIN/1.73
GLUCOSE BLD-MCNC: 148 MG/DL (ref 74–99)
HBA1C MFR BLD: 11.5 % (ref 4–5.6)
HCT VFR BLD CALC: 40.2 % (ref 37–54)
HEMOGLOBIN: 13 G/DL (ref 12.5–16.5)
INR BLD: 1.4
LYMPHOCYTES ABSOLUTE: 0.53 E9/L (ref 1.5–4)
LYMPHOCYTES RELATIVE PERCENT: 3 % (ref 20–42)
MCH RBC QN AUTO: 27.5 PG (ref 26–35)
MCHC RBC AUTO-ENTMCNC: 32.3 % (ref 32–34.5)
MCV RBC AUTO: 85 FL (ref 80–99.9)
METER GLUCOSE: 218 MG/DL (ref 74–99)
METER GLUCOSE: 241 MG/DL (ref 74–99)
METER GLUCOSE: 254 MG/DL (ref 74–99)
MONOCYTES ABSOLUTE: 1.25 E9/L (ref 0.1–0.95)
MONOCYTES RELATIVE PERCENT: 7 % (ref 2–12)
NEUTROPHILS ABSOLUTE: 15.84 E9/L (ref 1.8–7.3)
NEUTROPHILS RELATIVE PERCENT: 89 % (ref 43–80)
PDW BLD-RTO: 13.2 FL (ref 11.5–15)
PLATELET # BLD: 401 E9/L (ref 130–450)
PMV BLD AUTO: 10 FL (ref 7–12)
POTASSIUM SERPL-SCNC: 4.1 MMOL/L (ref 3.5–5)
PROTHROMBIN TIME: 16.3 SEC (ref 9.3–12.4)
RBC # BLD: 4.73 E12/L (ref 3.8–5.8)
SODIUM BLD-SCNC: 128 MMOL/L (ref 132–146)
WBC # BLD: 17.8 E9/L (ref 4.5–11.5)

## 2022-03-06 PROCEDURE — 80048 BASIC METABOLIC PNL TOTAL CA: CPT

## 2022-03-06 PROCEDURE — 85730 THROMBOPLASTIN TIME PARTIAL: CPT

## 2022-03-06 PROCEDURE — 82378 CARCINOEMBRYONIC ANTIGEN: CPT

## 2022-03-06 PROCEDURE — 6370000000 HC RX 637 (ALT 250 FOR IP): Performed by: INTERNAL MEDICINE

## 2022-03-06 PROCEDURE — 6360000002 HC RX W HCPCS: Performed by: SURGERY

## 2022-03-06 PROCEDURE — 1200000000 HC SEMI PRIVATE

## 2022-03-06 PROCEDURE — 36415 COLL VENOUS BLD VENIPUNCTURE: CPT

## 2022-03-06 PROCEDURE — 99223 1ST HOSP IP/OBS HIGH 75: CPT | Performed by: SURGERY

## 2022-03-06 PROCEDURE — 99223 1ST HOSP IP/OBS HIGH 75: CPT | Performed by: INTERNAL MEDICINE

## 2022-03-06 PROCEDURE — 85610 PROTHROMBIN TIME: CPT

## 2022-03-06 PROCEDURE — 83036 HEMOGLOBIN GLYCOSYLATED A1C: CPT

## 2022-03-06 PROCEDURE — 85025 COMPLETE CBC W/AUTO DIFF WBC: CPT

## 2022-03-06 PROCEDURE — 82962 GLUCOSE BLOOD TEST: CPT

## 2022-03-06 PROCEDURE — 2580000003 HC RX 258: Performed by: SURGERY

## 2022-03-06 RX ORDER — ATORVASTATIN CALCIUM 40 MG/1
40 TABLET, FILM COATED ORAL DAILY
Status: DISCONTINUED | OUTPATIENT
Start: 2022-03-06 | End: 2022-03-11 | Stop reason: HOSPADM

## 2022-03-06 RX ORDER — OXYCODONE HYDROCHLORIDE 5 MG/1
10 TABLET ORAL EVERY 4 HOURS PRN
Status: DISCONTINUED | OUTPATIENT
Start: 2022-03-06 | End: 2022-03-11 | Stop reason: HOSPADM

## 2022-03-06 RX ORDER — INSULIN GLARGINE 100 [IU]/ML
45 INJECTION, SOLUTION SUBCUTANEOUS NIGHTLY
Status: DISCONTINUED | OUTPATIENT
Start: 2022-03-06 | End: 2022-03-11 | Stop reason: HOSPADM

## 2022-03-06 RX ORDER — DEXTROSE MONOHYDRATE 25 G/50ML
12.5 INJECTION, SOLUTION INTRAVENOUS PRN
Status: DISCONTINUED | OUTPATIENT
Start: 2022-03-06 | End: 2022-03-06 | Stop reason: RX

## 2022-03-06 RX ORDER — HYDRALAZINE HYDROCHLORIDE 20 MG/ML
5 INJECTION INTRAMUSCULAR; INTRAVENOUS EVERY 4 HOURS PRN
Status: DISCONTINUED | OUTPATIENT
Start: 2022-03-06 | End: 2022-03-11 | Stop reason: HOSPADM

## 2022-03-06 RX ORDER — DEXTROSE MONOHYDRATE 50 MG/ML
100 INJECTION, SOLUTION INTRAVENOUS PRN
Status: DISCONTINUED | OUTPATIENT
Start: 2022-03-06 | End: 2022-03-11 | Stop reason: HOSPADM

## 2022-03-06 RX ORDER — SODIUM CHLORIDE 0.9 % (FLUSH) 0.9 %
5-40 SYRINGE (ML) INJECTION EVERY 12 HOURS SCHEDULED
Status: DISCONTINUED | OUTPATIENT
Start: 2022-03-06 | End: 2022-03-11 | Stop reason: HOSPADM

## 2022-03-06 RX ORDER — OXYCODONE HYDROCHLORIDE 5 MG/1
5 TABLET ORAL EVERY 4 HOURS PRN
Status: DISCONTINUED | OUTPATIENT
Start: 2022-03-06 | End: 2022-03-11 | Stop reason: HOSPADM

## 2022-03-06 RX ORDER — NICOTINE POLACRILEX 4 MG
15 LOZENGE BUCCAL PRN
Status: DISCONTINUED | OUTPATIENT
Start: 2022-03-06 | End: 2022-03-11 | Stop reason: HOSPADM

## 2022-03-06 RX ORDER — DEXTROSE MONOHYDRATE 100 MG/ML
125 INJECTION, SOLUTION INTRAVENOUS PRN
Status: DISCONTINUED | OUTPATIENT
Start: 2022-03-06 | End: 2022-03-11 | Stop reason: HOSPADM

## 2022-03-06 RX ORDER — SODIUM CHLORIDE 9 MG/ML
INJECTION, SOLUTION INTRAVENOUS CONTINUOUS
Status: DISCONTINUED | OUTPATIENT
Start: 2022-03-06 | End: 2022-03-11

## 2022-03-06 RX ORDER — MORPHINE SULFATE 2 MG/ML
2 INJECTION, SOLUTION INTRAMUSCULAR; INTRAVENOUS
Status: DISCONTINUED | OUTPATIENT
Start: 2022-03-06 | End: 2022-03-11 | Stop reason: HOSPADM

## 2022-03-06 RX ORDER — MORPHINE SULFATE 4 MG/ML
4 INJECTION, SOLUTION INTRAMUSCULAR; INTRAVENOUS
Status: DISCONTINUED | OUTPATIENT
Start: 2022-03-06 | End: 2022-03-11 | Stop reason: HOSPADM

## 2022-03-06 RX ORDER — ONDANSETRON 4 MG/1
4 TABLET, ORALLY DISINTEGRATING ORAL EVERY 8 HOURS PRN
Status: DISCONTINUED | OUTPATIENT
Start: 2022-03-06 | End: 2022-03-11 | Stop reason: HOSPADM

## 2022-03-06 RX ORDER — SODIUM CHLORIDE 0.9 % (FLUSH) 0.9 %
5-40 SYRINGE (ML) INJECTION PRN
Status: DISCONTINUED | OUTPATIENT
Start: 2022-03-06 | End: 2022-03-11 | Stop reason: HOSPADM

## 2022-03-06 RX ORDER — DEXTROSE MONOHYDRATE 100 MG/ML
250 INJECTION, SOLUTION INTRAVENOUS PRN
Status: DISCONTINUED | OUTPATIENT
Start: 2022-03-06 | End: 2022-03-11 | Stop reason: HOSPADM

## 2022-03-06 RX ORDER — ONDANSETRON 2 MG/ML
4 INJECTION INTRAMUSCULAR; INTRAVENOUS EVERY 6 HOURS PRN
Status: DISCONTINUED | OUTPATIENT
Start: 2022-03-06 | End: 2022-03-11 | Stop reason: HOSPADM

## 2022-03-06 RX ORDER — SODIUM CHLORIDE 9 MG/ML
25 INJECTION, SOLUTION INTRAVENOUS PRN
Status: DISCONTINUED | OUTPATIENT
Start: 2022-03-06 | End: 2022-03-11 | Stop reason: HOSPADM

## 2022-03-06 RX ADMIN — SODIUM CHLORIDE: 9 INJECTION, SOLUTION INTRAVENOUS at 02:09

## 2022-03-06 RX ADMIN — NYSTATIN 500000 UNITS: 100000 SUSPENSION ORAL at 17:48

## 2022-03-06 RX ADMIN — INSULIN LISPRO 2 UNITS: 100 INJECTION, SOLUTION INTRAVENOUS; SUBCUTANEOUS at 17:49

## 2022-03-06 RX ADMIN — INSULIN LISPRO 3 UNITS: 100 INJECTION, SOLUTION INTRAVENOUS; SUBCUTANEOUS at 12:17

## 2022-03-06 RX ADMIN — NYSTATIN 500000 UNITS: 100000 SUSPENSION ORAL at 21:06

## 2022-03-06 RX ADMIN — INSULIN LISPRO 1 UNITS: 100 INJECTION, SOLUTION INTRAVENOUS; SUBCUTANEOUS at 21:10

## 2022-03-06 RX ADMIN — INSULIN GLARGINE 45 UNITS: 100 INJECTION, SOLUTION SUBCUTANEOUS at 21:10

## 2022-03-06 RX ADMIN — ATORVASTATIN CALCIUM 40 MG: 40 TABLET, FILM COATED ORAL at 12:12

## 2022-03-06 RX ADMIN — Medication 10 ML: at 10:04

## 2022-03-06 RX ADMIN — PIPERACILLIN SODIUM AND TAZOBACTAM SODIUM 3375 MG: 3; 375 INJECTION, POWDER, FOR SOLUTION INTRAVENOUS at 03:19

## 2022-03-06 RX ADMIN — PIPERACILLIN SODIUM AND TAZOBACTAM SODIUM 3375 MG: 3; 375 INJECTION, POWDER, FOR SOLUTION INTRAVENOUS at 12:14

## 2022-03-06 RX ADMIN — Medication 10 ML: at 21:09

## 2022-03-06 RX ADMIN — PIPERACILLIN SODIUM AND TAZOBACTAM SODIUM 3375 MG: 3; 375 INJECTION, POWDER, FOR SOLUTION INTRAVENOUS at 19:30

## 2022-03-06 RX ADMIN — NYSTATIN 500000 UNITS: 100000 SUSPENSION ORAL at 12:12

## 2022-03-06 ASSESSMENT — PAIN SCALES - GENERAL
PAINLEVEL_OUTOF10: 0

## 2022-03-06 NOTE — H&P
General Surgery History and Physical  [facility]    Patient's Name/Date of Birth: Vianey Spencer / 1965    Date: March 6, 2022     Surgeon: Denver Rucks, MD    PCP: Grazyna Jones MD     Chief Complaint: Right lower quadrant pain    HPI:   Vianey Spencer is a 64 y.o. male who presents for evaluation of right lower quadrant pain which patient reports started a few days ago. He has some mild discomfort in the area and thought it was a gas bubble however presented to the emergency room yesterday when it did not improve. He denies any fevers or chills. He had a CT scan done in the emergency room that revealed a large right lower quadrant abscess. He was transferred to [place] for admission and further management. He denies any recent blood in the stool, no recent weight loss. He has no family history of colon cancer. He has never had a colonoscopy. Patient Active Problem List   Diagnosis    Diabetic foot ulcer ([address]    Diabetes mellitus ([address]    Osteomyelitis of ankle or foot, left, acute ([address]    Cellulitis of foot    Diabetic arthropathy (HCC)    Gangrene of toe of left foot (HCC)    Morbid (severe) obesity due to excess calories ([address]    Pure hypertriglyceridemia    Diabetic foot infection ([address]    DVT prophylaxis    ISAIAS (acute kidney injury) ([address]    Intra-abdominal abscess ([address]       Past Medical History:   Diagnosis Date    Diabetes mellitus ([address]     Gangrene of toe of left foot ([address] 11/17/2017    Hyperlipidemia     Hyperlipidemia     Sleep apnea     Vitamin D deficiency        Past Surgical History:   Procedure Laterality Date    OTHER SURGICAL HISTORY Left 11/17/2017    Amputation of left great toe.     TOE AMPUTATION      TOE AMPUTATION Left 10/21/2019    LEFT SECOND TOE AMPUTATION performed by Ric Wilson DPM at [address]       No Known Allergies    The patient has a family history that is negative for severe cardiovascular or respiratory issues, negative for reaction to anesthesia. Time spent reviewing past medical, surgical, social and family history, vitals, nursing assessment and images. No changes from above documented history. Social History     Socioeconomic History    Marital status:      Spouse name: Not on file    Number of children: Not on file    Years of education: Not on file    Highest education level: Not on file   Occupational History    Not on file   Tobacco Use    Smoking status: Never Smoker    Smokeless tobacco: Never Used   Substance and Sexual Activity    Alcohol use: No    Drug use: No    Sexual activity: Never   Other Topics Concern    Not on file   Social History Narrative    Not on file     Social Determinants of Health     Financial Resource Strain:     Difficulty of Paying Living Expenses: Not on file   Food Insecurity:     Worried About Running Out of Food in the Last Year: Not on file    Suraj of Food in the Last Year: Not on file   Transportation Needs:     Lack of Transportation (Medical): Not on file    Lack of Transportation (Non-Medical):  Not on file   Physical Activity:     Days of Exercise per Week: Not on file    Minutes of Exercise per Session: Not on file   Stress:     Feeling of Stress : Not on file   Social Connections:     Frequency of Communication with Friends and Family: Not on file    Frequency of Social Gatherings with Friends and Family: Not on file    Attends Rastafarian Services: Not on file    Active Member of Clubs or Organizations: Not on file    Attends Club or Organization Meetings: Not on file    Marital Status: Not on file   Intimate Partner Violence:     Fear of Current or Ex-Partner: Not on file    Emotionally Abused: Not on file    Physically Abused: Not on file    Sexually Abused: Not on file   Housing Stability:     Unable to Pay for Housing in the Last Year: Not on file    Number of [place] in the Last Year: Not on file    Unstable Housing in the Last Year: Not on file       I have reviewed relevant labs from this admission and interpretation is included in my assessment and plan    Review of Systems    A complete 10 system review was performed and are otherwise negative unless mentioned in the above HPI. Specific negatives are listed below but may not include all those reviewed. General ROS: negative obtundation, AMS  ENT ROS: negative rhinorrhea, epistaxis  Allergy and Immunology ROS: negative itchy/watery eyes or nasal congestion  Hematological and Lymphatic ROS: negative spontaneous bleeding or bruising  Endocrine ROS: negative  lethargy, mood swings, palpitations or polydipsia/polyuria  Respiratory ROS: negative sputum changes, stridor, tachypnea or wheezing  Cardiovascular ROS: negative for - loss of consciousness, murmur or orthopnea  Gastrointestinal ROS: negative for - hematochezia or hematemesis  Genito-Urinary ROS: negative for -  genital discharge or hematuria  Musculoskeletal ROS: negative for - focal weakness, gangrene  Psych/Neuro ROS: negative for - visual or auditory hallucinations, suicidal ideation    Physical exam:   /70   Pulse 98   Temp 98.7 °F (37.1 °C) (Oral)   Resp 20   Ht 6' 2\" (1.88 m)   Wt (!) 316 lb 1.6 oz (143.4 kg)   SpO2 94%   BMI 40.58 kg/m²   General appearance:  NAD, appears stated age  Head: NCAT, PERRLA, EOMI, red conjunctiva  Neck: supple, no masses, trachea midline  Lungs: Equal chest rise bilateral, no retractions, no wheezing  Heart: Reg rate  Abdomen: soft, tender mild right lower quadrant, no peritonitis, nondistended obese. Incarcerated umbilical hernia   Skin; warm and dry, no cyanosis  Gu: no cva tenderness  Extremities: atraumatic, no focal motor deficits, no open wounds  Psych: No tremor, visual hallucinations      Radiology: I reviewed relevant abdominal imaging from this admission and that available in the EMR including CT abd/pel from 3/5/22.  My assessment is large right lower quadrant phlegmon    Assessment:  Karin Dawkins is a 64 y.o. male with large right lower quadrant phlegmon/abscess concerning for perforated appendicitis, rule out neoplasm  Patient Active Problem List   Diagnosis    Diabetic foot ulcer ([address]    Diabetes mellitus ([address]    Osteomyelitis of ankle or foot, left, acute ([address]    Cellulitis of foot    Diabetic arthropathy ([address]    Gangrene of toe of left foot ([address]    Morbid (severe) obesity due to excess calories ([address]    Pure hypertriglyceridemia    Diabetic foot infection ([address]    DVT prophylaxis    ISAIAS (acute kidney injury) ([address]    Intra-abdominal abscess ([address]         Plan:  Admit to general floor  Hydrate  IV antibiotics  Consult ID for large intra-abdominal abscess  Case was discussed with interventional radiologist who thinks that this is a large phlegmon rather than a well-circumscribed and well-defined fluid collection.   Patient will be managed with IV antibiotics at this time and repeat CT scan with p.o. and IV contrast will be performed in the next 24 to 48 hours pending progress  Await CEA and labs today  Full liquid diet      Lincoln Ornelas MD  9:03 AM

## 2022-03-06 NOTE — CONSULTS
Infectious Disease Consult Note     Admit Date: 3/6/2022  1:45 AM    Chief complaint: Abdominal pain    Reason for Consult: Appendicular abscess    Requesting Physician:  Asiya Preston MD      HISTORY OF PRESENT ILLNESS:    Kaley Vicente is a 64 y.o. male who presents for evaluation of right lower quadrant pain which patient reports started a few days ago. He has some mild discomfort in the area and thought it was a gas bubble however presented to the emergency room yesterday when it did not improve. He denies any fevers or chills. He had a CT scan done in the emergency room that revealed a large right lower quadrant abscess. He was transferred to [place] for admission and further management. He denies any recent blood in the stool, no recent weight loss. He has no family history of colon cancer. He has never had a colonoscopy. Kaley Vicente is a 64 y.o. male who presents for evaluation of right lower quadrant pain which patient reports started a few days ago. He has some mild discomfort in the area and thought it was a gas bubble however presented to the emergency room yesterday when it did not improve. He denies any fevers or chills. He had a CT scan done in the emergency room that revealed a large right lower quadrant abscess. He was transferred to [place] for admission and further management. He denies any recent blood in the stool, no recent weight loss. He has no family history of colon cancer. He has never had a colonoscopy. Infectious disease consulted mainly for further antibiotic recommendation    REVIEW OF SYSTEMS:    Constitutional: + Fever, chills or rigors. No unexplained weight loss. HEENT: no headache, dizziness or lightheadedness. No sore throat or runny nose. Respiratory: + cough, chest pain, shortness of breath or wheeze. Cardiovascular: no chest pain or palpitations  Gastrointestinal: + nausea, + vomiting,  No diarrhea, constipation.  No blood in stool. + pain   Genitourinary: no dysuria, hematuria, urgency, frequency or incontinence. Musculoskeletal: no joint pain anywhere in body, or bodyache. Neurologic: no h/o passing out, focal weakness or seizure. Skin: no h/o rashes or easy bruising. Hematologic: no gum bleeding or easy bruising. No hemoptysis. MEDICAL HISTORY  Past Medical History:   Diagnosis Date    Diabetes [facility]     Gangrene of toe of left foot ([facility] 11/17/2017    Hyperlipidemia     Hyperlipidemia     Sleep apnea     Vitamin D deficiency       Immunization History   Administered Date(s) Administered    COVID-19, Pfizer Purple top, DILUTE for use, 12+ yrs, 30mcg/0.3mL dose 05/27/2021, 06/17/2021    Influenza Virus Vaccine 10/03/2019    Influenza, MDCK Quadv, with preserv IM (Flucelvax 2 yrs and older) 10/04/2019    Influenza, Quadv, IM, PF (6 mo and older Fluzone, Flulaval, Fluarix, and 3 yrs and older Afluria) 11/20/2017     Past Surgical History:   Procedure Laterality Date    OTHER SURGICAL HISTORY Left 11/17/2017    Amputation of left great toe.  TOE AMPUTATION      TOE AMPUTATION Left 10/21/2019    LEFT SECOND TOE AMPUTATION performed by Roosevelt Shahid DPM at [address] [place] [address]  family history includes Atrial Fibrillation in his mother; Cancer in his father and mother; Depression in his father, maternal grandmother, and mother.   SOCIAL HISTORY  Social History     Socioeconomic History    Marital status:      Spouse name: Not on file    Number of children: Not on file    Years of education: Not on file    Highest education level: Not on file   Occupational History    Not on file   Tobacco Use    Smoking status: Never Smoker    Smokeless tobacco: Never Used   Substance and Sexual Activity    Alcohol use: No    Drug use: No    Sexual activity: Never   Other Topics Concern    Not on file   Social History Narrative    Not on file     Social Determinants of Health     Financial Resource Strain:     Difficulty of Paying Living Expenses: Not on file   Food Insecurity:     Worried About Running Out of Food in the Last Year: Not on file    Suraj of Food in the Last Year: Not on file   Transportation Needs:     Lack of Transportation (Medical): Not on file    Lack of Transportation (Non-Medical):  Not on file   Physical Activity:     Days of Exercise per Week: Not on file    Minutes of Exercise per Session: Not on file   Stress:     Feeling of Stress : Not on file   Social Connections:     Frequency of Communication with Friends and Family: Not on file    Frequency of Social Gatherings with Friends and Family: Not on file    Attends Anabaptism Services: Not on file    Active Member of Clubs or Organizations: Not on file    Attends Club or Organization Meetings: Not on file    Marital Status: Not on file   Intimate Partner Violence:     Fear of Current or Ex-Partner: Not on file    Emotionally Abused: Not on file    Physically Abused: Not on file    Sexually Abused: Not on file   Housing Stability:     Unable to Pay for Housing in the Last Year: Not on file    Number of Places Lived in the Last Year: Not on file    Unstable Housing in the Last Year: Not on file         Current Medications:     Scheduled Meds:   sodium chloride flush  5-40 mL IntraVENous 2 times per day    enoxaparin  40 mg SubCUTAneous Daily    piperacillin-tazobactam  3,375 mg IntraVENous Q8H    atorvastatin  40 mg Oral Daily    insulin glargine  45 Units SubCUTAneous Nightly    nystatin  5 mL Oral 4x Daily    insulin lispro  0-6 Units SubCUTAneous TID WC    insulin lispro  0-3 Units SubCUTAneous Nightly     Continuous Infusions:   sodium chloride      sodium chloride 125 mL/hr at 03/06/22 0209    dextrose      dextrose      Or    dextrose       PRN Meds:sodium chloride flush, sodium chloride, ondansetron **OR** ondansetron, oxyCODONE **OR** oxyCODONE, morphine **OR** morphine, glucose, glucagon (rDNA), dextrose, dextrose **OR** dextrose, hydrALAZINE      PHYSICAL EXAM:  Vitals:    03/06/22 0153 03/06/22 0614   BP: 122/75 117/70   Pulse: 101 98   Resp: 18 20   Temp: 99.3 °F (37.4 °C) 98.7 °F (37.1 °C)   TempSrc: Oral Oral   SpO2: 93% 94%   Weight: (!) 316 lb 1.6 oz (143.4 kg)    Height: 6' 2\" (1.88 m)        General Appearance:       Alert, cooperative, no distress, appears stated age        Heent:    Normocephalic, atraumatic,     PERRL, conjunctiva/corneas clear     no drainage or sinus tenderness      Neck:   Supple, symmetrical, trachea midline   Back:     Symmetric, no CVA tenderness   Lungs:     Clear to auscultation bilaterally, respirations unlabored   Chest Wall:    No tenderness or deformity    Heart:    Regular rate and rhythm, S1 and S2 normal, no murmur, rub or   gallop   Abdomen:    Soft with tenderness at right lower quadrant  , bowel sounds active all four quadrants         Extremities:   Extremities normal, atraumatic, no cyanosis or edema   Pulses:   LE extremities   Skin:   Skin color, texture, turgor normal, no rashes or lesions   Lymph nodes:   Cervical, supraclavicular, and axillary nodes normal   Neurologic:   CNII-XII intact, no focal deficits    LINES : Peripheral    SIERRA: Absent       LABS AND DATA REVIEW:     Recent Labs     03/05/22  1630 03/06/22  0330   WBC 19.2* 17.8*   HGB 14.1 13.0   HCT 41.3 40.2   MCV 81.5 85.0    401     Recent Labs     03/05/22  1630 03/05/22  1630 03/05/22  1631 03/05/22 2051 03/06/22  0856   *  --   --   --  128*   K 4.2  --   --   --  4.1   CL 90*   < >  --   --  95*   CO2 18*   < >  --   --  21*   BUN 10  --   --   --  9   CREATININE 0.8  --   --   --  0.7   GFRAA >60  --   --   --  >60   LABGLOM >60  --   --   --  >60   GLUCOSE 307*   < > 303 166 148*   PROT 7.8  --   --   --   --    LABALBU 2.8*  --   --   --   --    CALCIUM 9.0  --   --   --  8.2*   BILITOT 0.9  --   --   --   --    ALKPHOS 127  --   --   --   --    AST 25 --   --   --   --    ALT 18  --   --   --   --     < > = values in this interval not displayed. BLOOD CX #1  No results for input(s): BC in the last 72 hours. BLOOD CX #2  No results for input(s): Ade Bustard in the last 72 hours. WOUND culture  No results for input(s): WNDABS in the last 72 hours. URINE CULTURE  No results for input(s): LABURIN in the last 72 hours. ASSESSMENT & PLAN    14-year-old male admitted to the hospital with severe abdominal pain and on further work-up was found to have appendicular abscess    Patient will require CT-guided drainage of an appendicular abscess    We will treat the patient with Zosyn  Follow culture report we will also check CEA level  Discussed at length with the patient's wife present in the room    Thank you for consult.       Jazmin Aguirre MD, FACP  3/6/2022  1:23 PM

## 2022-03-06 NOTE — CONSULTS
[address] [place], [place] [address] Group   History and Physical      CHIEF COMPLAINT: ab pain  History of Present Illness:  64 y.o. male with a history of dm, gangrene left toe, hyperlipidemia, sleep apnea, vit d deficincy  presents with ab pain located in right lower quadrant pain. It began a few days prior to admission. He described it as mild but came in since it failed to improved. He admits that his diabetes is uncontrolled with a1c 13 many months ago, over due for a recheck  ED CT:  large right lower quadrant abscess. Background:  no family history of colon cancer. He has never had a colonoscopy. Informant(s) for H&P:chart review, patient    REVIEW OF SYSTEMS:  no fevers, chills, cp, sob, n/v, ha, vision/hearing changes, wt changes, hot/cold flashes, other open skin lesions, diarrhea, constipation, dysuria/hematuria unless noted in HPI. Complete ROS performed with the patient and is otherwise negative. PMH:  Past Medical History:   Diagnosis Date    Diabetes mellitus ([facility]     Gangrene of toe of left foot ([facility] 11/17/2017    Hyperlipidemia     Hyperlipidemia     Sleep apnea     Vitamin D deficiency        Surgical History:  Past Surgical History:   Procedure Laterality Date    OTHER SURGICAL HISTORY Left 11/17/2017    Amputation of left great toe.  TOE AMPUTATION      TOE AMPUTATION Left 10/21/2019    LEFT SECOND TOE AMPUTATION performed by Axel Cano DPM at [address]       Medications Prior to Admission:    Prior to Admission medications    Medication Sig Start Date End Date Taking?  Authorizing Provider   sodium hypochlorite (DAKINS) 0.25 % SOLN Irrigate with 15 mLs as directed daily 10/23/19   SCARLETT Frye CNP   nystatin (MYCOSTATIN) 890667 UNIT/ML suspension Take 5 mLs by mouth 4 times daily 10/23/19   SCARLETT Frye CNP   atorvastatin (LIPITOR) 40 MG tablet Take 40 mg by mouth daily    Historical Provider, MD   acetaminophen (TYLENOL) 500 MG tablet Take 2 tablets by mouth every 8 hours as needed for Pain 11/21/17   Prabhu Castillo MD   Glucose Blood (BLOOD GLUCOSE TEST STRIPS) STRP 1 each by In Vitro route 4 times daily (with meals and nightly) 11/21/17   Prabhu Castillo MD   insulin lispro (HUMALOG KWIKPEN) 100 UNIT/ML pen Inject 15 Units into the skin 3 times daily (before meals) 11/21/17   Prabhu Castillo MD   FREESTYLE LANCETS MISC 1 each by Does not apply route 4 times daily as needed (blood sugar) 11/21/17   Prabhu Castillo MD   Blood Glucose Monitoring Suppl ([employer]-CARE GLUCOMETER) w/Device KIT 1 each by Does not apply route 4 times daily (with meals and nightly) 11/21/17   Prabhu Castillo MD   Insulin Pen Needle 32G X 4 MM MISC 1 each by Does not apply route 4 times daily (before meals and nightly) 11/21/17   Prabhu Castillo MD   insulin glargine (LANTUS SOLOSTAR) 100 UNIT/ML injection pen Inject 56 Units into the skin nightly  Patient taking differently: Inject 60 Units into the skin nightly  11/21/17   Prabhu Castillo MD   loratadine (CLARITIN) 10 MG tablet Take 10 mg by mouth daily as needed     Historical Provider, MD       Allergies:    Patient has no known allergies. Social History:    reports that he has never smoked. He has never used smokeless tobacco. He reports that he does not drink alcohol and does not use drugs. Family History:   family history includes Atrial Fibrillation in his mother; Cancer in his father and mother; Depression in his father, maternal grandmother, and mother.      PHYSICAL EXAM:  Vitals:  /70   Pulse 98   Temp 98.7 °F (37.1 °C) (Oral)   Resp 20   Ht 6' 2\" (1.88 m)   Wt (!) 316 lb 1.6 oz (143.4 kg)   SpO2 94%   BMI 40.58 kg/m²     General Appearance: alert and oriented to person, place and time and in no acute distress  Skin: warm and dry  Head: normocephalic and atraumatic  Eyes: pupils equal, round, and reactive to light, extraocular eye movements intact, conjunctivae normal  Neck: neck supple and non tender without mass   Pulmonary/Chest: clear to auscultation bilaterally- no wheezes, rales or rhonchi, normal air movement, no respiratory distress  Cardiovascular: normal rate, normal S1 and S2 and no carotid bruits  Abdomen: soft, non-tender, non-distended, normal bowel sounds, no masses or organomegaly  Extremities: no cyanosis, no clubbing and no edema  Neurologic: no cranial nerve deficit and speech normal    LABS:  Recent Labs     03/05/22  1630 03/05/22  1630 03/05/22  1631 03/05/22  2051 03/06/22  0856   *  --   --   --  128*   K 4.2  --   --   --  4.1   CL 90*  --   --   --  95*   CO2 18*  --   --   --  21*   BUN 10  --   --   --  9   CREATININE 0.8  --   --   --  0.7   GLUCOSE 307*   < > 303 166 148*   CALCIUM 9.0  --   --   --  8.2*    < > = values in this interval not displayed. Recent Labs     03/05/22  1630 03/06/22  0330   WBC 19.2* 17.8*   RBC 5.07 4.73   HGB 14.1 13.0   HCT 41.3 40.2   MCV 81.5 85.0   MCH 27.8 27.5   MCHC 34.1 32.3   RDW 13.1 13.2    401   MPV 9.8 10.0       No results for input(s): POCGLU in the last 72 hours. Radiology: No results found. EKG: sinus tachy no ischemia    ASSESSMENT:      Principal Problem:    Intra-abdominal abscess (HCC)  Resolved Problems:    * No resolved hospital problems. *      PLAN:    1. Ab abscess zosyn per id. Other management including following cea result and repeat ct ab per primary surgical service  2. No change to outpatient treatment regimen for the existing stable combordities including:PVD with h/o of amputation of gangrene left toe, hyperlipidemia, Vit d deficiency. 3. Bret:  RN to find out if patient is supposed to be on CPAP hs for sleep apnea. If so, please order it here at home settings [facility]  4. Dm: I will defer diet to surgery but when eating, should be diabetic.  ssc  5. Full code  6. dvt prophy lovenox  7.  Dispo: home when ready      NOTE: This report was transcribed using voice recognition software.  Every effort was made to ensure accuracy; however, inadvertent computerized transcription errors may be present.     Electronically signed by Marleny Voss MD on 3/6/2022 at 10:30 AM

## 2022-03-07 LAB
ANION GAP SERPL CALCULATED.3IONS-SCNC: 12 MMOL/L (ref 7–16)
BASOPHILS ABSOLUTE: 0.07 E9/L (ref 0–0.2)
BASOPHILS RELATIVE PERCENT: 0.5 % (ref 0–2)
BUN BLDV-MCNC: 7 MG/DL (ref 6–20)
CALCIUM SERPL-MCNC: 7.9 MG/DL (ref 8.6–10.2)
CHLORIDE BLD-SCNC: 95 MMOL/L (ref 98–107)
CO2: 22 MMOL/L (ref 22–29)
CREAT SERPL-MCNC: 0.7 MG/DL (ref 0.7–1.2)
EKG ATRIAL RATE: 119 BPM
EKG P AXIS: 38 DEGREES
EKG P-R INTERVAL: 170 MS
EKG Q-T INTERVAL: 312 MS
EKG QRS DURATION: 76 MS
EKG QTC CALCULATION (BAZETT): 438 MS
EKG R AXIS: 5 DEGREES
EKG T AXIS: 58 DEGREES
EKG VENTRICULAR RATE: 119 BPM
EOSINOPHILS ABSOLUTE: 0.06 E9/L (ref 0.05–0.5)
EOSINOPHILS RELATIVE PERCENT: 0.4 % (ref 0–6)
GFR AFRICAN AMERICAN: >60
GFR NON-AFRICAN AMERICAN: >60 ML/MIN/1.73
GLUCOSE BLD-MCNC: 243 MG/DL (ref 74–99)
HCT VFR BLD CALC: 39.6 % (ref 37–54)
HEMOGLOBIN: 12.9 G/DL (ref 12.5–16.5)
IMMATURE GRANULOCYTES #: 0.2 E9/L
IMMATURE GRANULOCYTES %: 1.4 % (ref 0–5)
LYMPHOCYTES ABSOLUTE: 0.9 E9/L (ref 1.5–4)
LYMPHOCYTES RELATIVE PERCENT: 6.2 % (ref 20–42)
MCH RBC QN AUTO: 27.4 PG (ref 26–35)
MCHC RBC AUTO-ENTMCNC: 32.6 % (ref 32–34.5)
MCV RBC AUTO: 84.1 FL (ref 80–99.9)
METER GLUCOSE: 237 MG/DL (ref 74–99)
METER GLUCOSE: 238 MG/DL (ref 74–99)
METER GLUCOSE: 242 MG/DL (ref 74–99)
METER GLUCOSE: 303 MG/DL (ref 74–99)
MONOCYTES ABSOLUTE: 1.46 E9/L (ref 0.1–0.95)
MONOCYTES RELATIVE PERCENT: 10.1 % (ref 2–12)
NEUTROPHILS ABSOLUTE: 11.75 E9/L (ref 1.8–7.3)
NEUTROPHILS RELATIVE PERCENT: 81.4 % (ref 43–80)
PDW BLD-RTO: 13.1 FL (ref 11.5–15)
PLATELET # BLD: 386 E9/L (ref 130–450)
PMV BLD AUTO: 9.7 FL (ref 7–12)
POTASSIUM REFLEX MAGNESIUM: 3.6 MMOL/L (ref 3.5–5)
RBC # BLD: 4.71 E12/L (ref 3.8–5.8)
SODIUM BLD-SCNC: 129 MMOL/L (ref 132–146)
TSH SERPL DL<=0.05 MIU/L-ACNC: 1.3 UIU/ML (ref 0.27–4.2)
WBC # BLD: 14.4 E9/L (ref 4.5–11.5)

## 2022-03-07 PROCEDURE — 82962 GLUCOSE BLOOD TEST: CPT

## 2022-03-07 PROCEDURE — 6360000002 HC RX W HCPCS: Performed by: SURGERY

## 2022-03-07 PROCEDURE — 2580000003 HC RX 258: Performed by: SURGERY

## 2022-03-07 PROCEDURE — 80048 BASIC METABOLIC PNL TOTAL CA: CPT

## 2022-03-07 PROCEDURE — 36415 COLL VENOUS BLD VENIPUNCTURE: CPT

## 2022-03-07 PROCEDURE — 85025 COMPLETE CBC W/AUTO DIFF WBC: CPT

## 2022-03-07 PROCEDURE — 6370000000 HC RX 637 (ALT 250 FOR IP): Performed by: INTERNAL MEDICINE

## 2022-03-07 PROCEDURE — 99233 SBSQ HOSP IP/OBS HIGH 50: CPT | Performed by: INTERNAL MEDICINE

## 2022-03-07 PROCEDURE — 93010 ELECTROCARDIOGRAM REPORT: CPT | Performed by: INTERNAL MEDICINE

## 2022-03-07 PROCEDURE — 99232 SBSQ HOSP IP/OBS MODERATE 35: CPT | Performed by: SURGERY

## 2022-03-07 PROCEDURE — 1200000000 HC SEMI PRIVATE

## 2022-03-07 PROCEDURE — 84443 ASSAY THYROID STIM HORMONE: CPT

## 2022-03-07 PROCEDURE — 2580000003 HC RX 258: Performed by: INTERNAL MEDICINE

## 2022-03-07 RX ADMIN — ATORVASTATIN CALCIUM 40 MG: 40 TABLET, FILM COATED ORAL at 08:06

## 2022-03-07 RX ADMIN — NYSTATIN 500000 UNITS: 100000 SUSPENSION ORAL at 08:06

## 2022-03-07 RX ADMIN — INSULIN LISPRO 1 UNITS: 100 INJECTION, SOLUTION INTRAVENOUS; SUBCUTANEOUS at 21:08

## 2022-03-07 RX ADMIN — INSULIN LISPRO 2 UNITS: 100 INJECTION, SOLUTION INTRAVENOUS; SUBCUTANEOUS at 08:09

## 2022-03-07 RX ADMIN — INSULIN LISPRO 4 UNITS: 100 INJECTION, SOLUTION INTRAVENOUS; SUBCUTANEOUS at 11:14

## 2022-03-07 RX ADMIN — NYSTATIN 500000 UNITS: 100000 SUSPENSION ORAL at 13:34

## 2022-03-07 RX ADMIN — NYSTATIN 500000 UNITS: 100000 SUSPENSION ORAL at 21:09

## 2022-03-07 RX ADMIN — PIPERACILLIN SODIUM AND TAZOBACTAM SODIUM 3375 MG: 3; 375 INJECTION, POWDER, FOR SOLUTION INTRAVENOUS at 18:50

## 2022-03-07 RX ADMIN — INSULIN LISPRO 2 UNITS: 100 INJECTION, SOLUTION INTRAVENOUS; SUBCUTANEOUS at 16:18

## 2022-03-07 RX ADMIN — PIPERACILLIN SODIUM AND TAZOBACTAM SODIUM 3375 MG: 3; 375 INJECTION, POWDER, FOR SOLUTION INTRAVENOUS at 11:01

## 2022-03-07 RX ADMIN — INSULIN LISPRO 5 UNITS: 100 INJECTION, SOLUTION INTRAVENOUS; SUBCUTANEOUS at 16:18

## 2022-03-07 RX ADMIN — PIPERACILLIN SODIUM AND TAZOBACTAM SODIUM 3375 MG: 3; 375 INJECTION, POWDER, FOR SOLUTION INTRAVENOUS at 03:30

## 2022-03-07 RX ADMIN — NYSTATIN 500000 UNITS: 100000 SUSPENSION ORAL at 17:05

## 2022-03-07 RX ADMIN — INSULIN LISPRO 5 UNITS: 100 INJECTION, SOLUTION INTRAVENOUS; SUBCUTANEOUS at 08:11

## 2022-03-07 RX ADMIN — SODIUM CHLORIDE: 9 INJECTION, SOLUTION INTRAVENOUS at 16:22

## 2022-03-07 RX ADMIN — INSULIN LISPRO 5 UNITS: 100 INJECTION, SOLUTION INTRAVENOUS; SUBCUTANEOUS at 11:13

## 2022-03-07 RX ADMIN — INSULIN GLARGINE 45 UNITS: 100 INJECTION, SOLUTION SUBCUTANEOUS at 21:09

## 2022-03-07 RX ADMIN — ENOXAPARIN SODIUM 40 MG: 100 INJECTION SUBCUTANEOUS at 10:07

## 2022-03-07 ASSESSMENT — PAIN SCALES - GENERAL: PAINLEVEL_OUTOF10: 0

## 2022-03-07 NOTE — PROGRESS NOTES
Assessment on patients feet not done due to patients refusal to remove socks. Patient states he does have amputated toes on left foot. Also stated that he inspects his feet regularly at home. Only able to visualize vascular discoloration to BLE. Pt denies any wounds to feet but unable to assess due to refusal to remove socks. Education provided to patient as to why we assess skin, pt acknowledged understanding but states he \"has it under control\".

## 2022-03-07 NOTE — PROGRESS NOTES
GENERAL SURGERY  DAILY PROGRESS NOTE  3/7/2022    CC: RLQ phlegm     Subjective:  no acute issues overnight. Pain appears to be stable. No N/V, no F/C     Objective:  BP (!) 140/76   Pulse 93   Temp 98.6 °F (37 °C) (Oral)   Resp 18   Ht 6' 2\" (1.88 m)   Wt (!) 316 lb 1.6 oz (143.4 kg)   SpO2 93%   BMI 40.58 kg/m²     GENERAL:  Laying in bed, awake, alert, cooperative, no apparent distress  LUNGS:  No increased work of breathing  CARDIOVASCULAR:  RR  ABDOMEN:  Soft, mild tenderness , non-distended  EXTREMITIES: No edema or swelling  SKIN: Warm and dry    Assessment/Plan:  64 y.o. male with large right lower quadrant phlegmon/abscess concerning for perforated appendicitis    Continue abx  Continue CLD  Will plan on repeat CT scan tomorrow to evaluate for possible abscess     Electronically signed by Trevon Lr DO on 3/7/2022 at 7:07 AM     General Surgery Progress Note  [facility]    Patient's Name/Date of Birth: Guillermo Urena / 1965    Date: March 7, 2022     Surgeon: Moncho Gamino MD    Chief Complaint: intraabdominal abscess    Patient Active Problem List   Diagnosis    Diabetic foot ulcer ([address]    Diabetes mellitus ([address]    Osteomyelitis of ankle or foot, left, acute ([address]    Cellulitis of foot    Diabetic arthropathy ([address]    Gangrene of toe of left foot ([address]    Morbid (severe) obesity due to excess calories ([address]    Pure hypertriglyceridemia    Diabetic foot infection ([address]    DVT prophylaxis    ISAIAS (acute kidney injury) ([address]    Intra-abdominal abscess ([address]       Subjective: as above. Feels better today. Tolerating full liquids.  Denies fevers/chills    Objective:  BP (!) 140/76   Pulse 100   Temp 98.6 °F (37 °C) (Oral)   Resp 18   Ht 6' 2\" (1.88 m)   Wt (!) 316 lb 1.6 oz (143.4 kg)   SpO2 95%   BMI 40.58 kg/m²   Labs:  Recent Labs     03/05/22  1630 03/06/22  0330 03/07/22  0525   WBC 19.2* 17.8* 14.4*   HGB 14.1 13.0 12.9   HCT 41.3 40.2 39.6     Lab Results   Component Value Date    CREATININE 0.7 03/07/2022    BUN 7 03/07/2022     (L) 03/07/2022    K 3.6 03/07/2022    CL 95 (L) 03/07/2022    CO2 22 03/07/2022     No results for input(s): LIPASE, AMYLASE in the last 72 hours.   CBC with Differential:    Lab Results   Component Value Date    WBC 14.4 03/07/2022    RBC 4.71 03/07/2022    HGB 12.9 03/07/2022    HCT 39.6 03/07/2022     03/07/2022    MCV 84.1 03/07/2022    MCH 27.4 03/07/2022    MCHC 32.6 03/07/2022    RDW 13.1 03/07/2022    LYMPHOPCT 6.2 03/07/2022    MONOPCT 10.1 03/07/2022    BASOPCT 0.5 03/07/2022    MONOSABS 1.46 03/07/2022    LYMPHSABS 0.90 03/07/2022    EOSABS 0.06 03/07/2022    BASOSABS 0.07 03/07/2022     CMP:    Lab Results   Component Value Date     03/07/2022    K 3.6 03/07/2022    CL 95 03/07/2022    CO2 22 03/07/2022    BUN 7 03/07/2022    CREATININE 0.7 03/07/2022    GFRAA >60 03/07/2022    LABGLOM >60 03/07/2022    GLUCOSE 243 03/07/2022    GLUCOSE 191 04/01/2011    PROT 7.8 03/05/2022    LABALBU 2.8 03/05/2022    LABALBU 4.4 04/01/2011    CALCIUM 7.9 03/07/2022    BILITOT 0.9 03/05/2022    ALKPHOS 127 03/05/2022    AST 25 03/05/2022    ALT 18 03/05/2022       General appearance:  NAD  Head: NCAT, PERRLA, EOMI, red conjunctiva  Neck: supple, no masses  Lungs: CTAB, equal chest rise bilateral  Heart: Reg rate  Abdomen: soft, nondistended, tender mild rlq, no peritonitis  Skin; no lesions  Gu: no cva tenderness  Extremities: extremities normal, atraumatic, no cyanosis or edema      Assessment/Plan:  Marifer Rutherford is a 64 y.o. male large right lower quadrant phlegmon/abscess concerning for perforated appendicitis with abscess    Continue IV antibiotics  Infectious disease recommendations appreciated  Continue diet as tolerated  Repeat CT abdomen pelvis with p.o. and IV contrast tomorrow to evaluate for need for IR drain    Pretty Plunkett MD  3/7/2022  12:57 PM

## 2022-03-07 NOTE — PROGRESS NOTES
Comprehensive Nutrition Assessment    Type and Reason for Visit:  Initial,Positive Nutrition Screen    Nutrition Recommendations/Plan: Start Gelatein BID to help meet estimated nutrient needs on clears. Will monitor for nutrition progression. Nutrition Assessment:  Pt adm w/ severe abd pain 2/2 phlegmon/abscess concerning for perforated appendicitis, pending repeat CT & possible IR drain. Note uncontrolled DM. Pt on CLD, will add Gelatein BID & monitor. Malnutrition Assessment:  Malnutrition Status: At risk for malnutrition (Comment)    Context:  Acute Illness     Findings of the 6 clinical characteristics of malnutrition:  Energy Intake:  No significant decrease in energy intake  Weight Loss:  Unable to assess (d/t lack wt hx per EMR)     Body Fat Loss:  No significant body fat loss     Muscle Mass Loss:  No significant muscle mass loss    Fluid Accumulation:  No significant fluid accumulation     Strength:  Not Performed    Estimated Daily Nutrient Needs:  Energy (kcal):  2163-5574; Weight Used for Energy Requirements:  Current     Protein (g):  150-170; Weight Used for Protein Requirements:  Ideal (1.8-2.0)        Fluid (ml/day):  6798-3898; Method Used for Fluid Requirements:  1 ml/kcal      Nutrition Related Findings:  A&O, hypoactive BS, soft/tender abd, nonpitting BLE edema, +I/Os      Wounds:  None       Current Nutrition Therapies:    ADULT DIET;  Full Liquid    Anthropometric Measures:  · Height: 6' 2\" (188 cm)  · Current Body Weight: 316 lb 1.6 oz (143.4 kg) (3/6 bed scale)   · Admission Body Weight: 316 lb 1.6 oz (143.4 kg) (3/6 bed scale)    · Usual Body Weight:  (no actual wt per EMR)     · Ideal Body Weight: 190 lbs; % Ideal Body Weight 166.4 %   · BMI: 40.6  · BMI Categories: Obese Class 3 (BMI 40.0 or greater)       Nutrition Diagnosis:   · Inadequate oral intake related to altered GI structure as evidenced by NPO or clear liquid status due to medical condition    Nutrition Interventions:   Food and/or Nutrient Delivery:  Continue NPO,Start Oral Nutrition Supplement (Gelatein BID)  Nutrition Education/Counseling:  Education not indicated   Coordination of Nutrition Care:  Continue to monitor while inpatient    Goals:  Nutrition progression       Nutrition Monitoring and Evaluation:   Behavioral-Environmental Outcomes:  None Identified   Food/Nutrient Intake Outcomes:  Diet Advancement/Tolerance,Supplement Intake,Food and Nutrient Intake  Physical Signs/Symptoms Outcomes:  Biochemical Data,Nutrition Focused Physical Findings,Skin,Weight,Chewing or Swallowing,GI Status,Fluid Status or Edema     Discharge Planning:     Too soon to determine     Electronically signed by Belem Mejias MS, RD, LD on 3/7/22 at 12:49 PM EST    Contact: [phone]

## 2022-03-07 NOTE — PROGRESS NOTES
Infectious Disease  t Note     Admit Date: 3/6/2022  1:45 AM    Chief complaint: Abdominal pain    Reason for Consult: Appendicular abscess    Requesting Physician:  Nelly Kam MD      HISTORY OF PRESENT ILLNESS:    Flores Sargent is a 64 y.o. male who presents for evaluation of right lower quadrant pain which patient reports started a few days ago. He has some mild discomfort in the area and thought it was a gas bubble however presented to the emergency room yesterday when it did not improve. He denies any fevers or chills. He had a CT scan done in the emergency room that revealed a large right lower quadrant abscess. He was transferred to [place] for admission and further management. He denies any recent blood in the stool, no recent weight loss. He has no family history of colon cancer. He has never had a colonoscopy. Flores Sargent is a 64 y.o. male who presents for evaluation of right lower quadrant pain which patient reports started a few days ago. He has some mild discomfort in the area and thought it was a gas bubble however presented to the emergency room yesterday when it did not improve. He denies any fevers or chills. He had a CT scan done in the emergency room that revealed a large right lower quadrant abscess. He was transferred to [place] for admission and further management. He denies any recent blood in the stool, no recent weight loss. He has no family history of colon cancer. He has never had a colonoscopy.     Infectious disease consulted mainly for further antibiotic recommendation    DOS  03/07/22   On his side no c/o  Still with lower abd pain    REVIEW OF SYSTEMS:    No f/c/n/v/d/rash/itch     MEDICAL HISTORY  Past Medical History:   Diagnosis Date    Diabetes mellitus [facility] [address] [facility]     Gangrene of toe of left foot ([facility] [address] [facility] 11/17/2017    Hyperlipidemia     Hyperlipidemia     Sleep apnea     Vitamin D deficiency       Immunization History Administered Date(s) Administered    COVID-19, Pfizer Purple top, DILUTE for use, 12+ yrs, 30mcg/0.3mL dose 05/27/2021, 06/17/2021    Influenza Virus Vaccine 10/03/2019    Influenza, MDCK Quadv, with preserv IM (Flucelvax 2 yrs and older) 10/04/2019    Influenza, Quadv, IM, PF (6 mo and older Fluzone, Flulaval, Fluarix, and 3 yrs and older Afluria) 11/20/2017     Past Surgical History:   Procedure Laterality Date    OTHER SURGICAL HISTORY Left 11/17/2017    Amputation of left great toe.     TOE AMPUTATION      TOE AMPUTATION Left 10/21/2019    LEFT SECOND TOE AMPUTATION performed by Axel Cano DPM at [id number]        Current Medications:     Scheduled Meds:   insulin lispro  5 Units SubCUTAneous TID WC    sodium chloride flush  5-40 mL IntraVENous 2 times per day    enoxaparin  40 mg SubCUTAneous Daily    piperacillin-tazobactam  3,375 mg IntraVENous Q8H    atorvastatin  40 mg Oral Daily    insulin glargine  45 Units SubCUTAneous Nightly    nystatin  5 mL Oral 4x Daily    insulin lispro  0-6 Units SubCUTAneous TID WC    insulin lispro  0-3 Units SubCUTAneous Nightly     Continuous Infusions:   sodium chloride      sodium chloride 75 mL/hr at 03/07/22 0806    dextrose      dextrose      Or    dextrose       PRN Meds:sodium chloride flush, sodium chloride, ondansetron **OR** ondansetron, oxyCODONE **OR** oxyCODONE, morphine **OR** morphine, glucose, glucagon (rDNA), dextrose, dextrose **OR** dextrose, hydrALAZINE      PHYSICAL EXAM:  Vitals:    03/06/22 2045 03/07/22 0700 03/07/22 0743 03/07/22 1235   BP: 125/76 (!) 140/76     Pulse: 102 93 100    Resp: 18 18     Temp: 99.5 °F (37.5 °C) 98.6 °F (37 °C)     TempSrc: Oral Oral     SpO2: 92% 93% 95%    Weight:       Height:    6' 2\" (1.88 m)       General Appearance:       Alert, cooperative, no distress, appears stated age inc bmo         Heent:    Normocephalic, atraumatic,             Back:     Symmetric, no CVA tenderness   Lungs:     Clear to auscultation bilaterally, respirations unlabored   Chest Wall:    No tenderness or deformity    Heart:    Regular rate and rhythm, S1 and S2 normal, n    Abdomen:    Soft with tenderness at right lower quadrant bowel sounds hypo active all four quadrants         Extremities:   Extremities normal, atraumatic, no cyanosis or edema       Skin:   Skin color, texture, turgor normal, no rashes or lesions birthmarck right cheek   Lymph nodes:   Cervical, supraclavicular, and axillary nodes normal   Neurologic:   CNII-XII intact, no focal deficits    LINES : Peripheral    SIERRA: Absent       LABS AND DATA REVIEW:     Recent Labs     03/05/22  1630 03/06/22  0330 03/07/22  0525   WBC 19.2* 17.8* 14.4*   HGB 14.1 13.0 12.9   HCT 41.3 40.2 39.6   MCV 81.5 85.0 84.1    401 386     Recent Labs     03/05/22  1630 03/05/22  1630 03/05/22  1631 03/05/22  2051 03/06/22  0856 03/07/22  0525   *  --   --   --  128* 129*   K 4.2   < >  --   --  4.1 3.6   CL 90*   < >  --   --  95* 95*   CO2 18*   < >  --   --  21* 22   BUN 10  --   --   --  9 7   CREATININE 0.8  --   --   --  0.7 0.7   GFRAA >60  --   --   --  >60 >60   LABGLOM >60  --   --   --  >60 >60   GLUCOSE 307*  --    < > 166 148* 243*   PROT 7.8  --   --   --   --   --    LABALBU 2.8*  --   --   --   --   --    CALCIUM 9.0  --   --   --  8.2* 7.9*   BILITOT 0.9  --   --   --   --   --    ALKPHOS 127  --   --   --   --   --    AST 25  --   --   --   --   --    ALT 18  --   --   --   --   --     < > = values in this interval not displayed.           Recent Labs     03/05/22  1631   LABURIN <10,000 CFU/mL  Gram positive organism           ASSESSMENT & PLAN    70-year-old male admitted to the hospital with severe abdominal pain and on further work-up was found to have appendicular abscess 9x10 cm   Leukocytosis tredning down     For CT-guided drainage of an appendicular abscess     piperacillin-tazobactam (ZOSYN) 3,375 mg in dextrose 5 % 50 mL IVPB extended infusion (mini-bag), Q8H        Imaging and labs were reviewed. Oscar Simpson was informed of their diagnosis, indications, risks and benefits of treatment. Oscar Simpson had the opportunity to ask questions. All questions were answered. Thank you for involving me in the care of Oscar Simpson. Please do not hesitate to call for any questions or concerns.     Electronically signed by Alivia Ramirez MD on 3/7/2022 at 6:21 PM

## 2022-03-07 NOTE — PROGRESS NOTES
Department of Internal Medicine  General Internal Medicine  Attending Progress Note  CC:  Intractable Right Lower Quadrant pain    SUBJECTIVE:    Reports that the abdominal pain is still there. He denied fever and chills. No nausea or vomiting. He is aware of plans to go for IR drainage of abscess and repeat CT scan in am. He is also aware of plans to continue current abx.      OBJECTIVE      Medications    Current Facility-Administered Medications: insulin lispro (HUMALOG) injection vial 5 Units, 5 Units, SubCUTAneous, TID WC  sodium chloride flush 0.9 % injection 5-40 mL, 5-40 mL, IntraVENous, 2 times per day  sodium chloride flush 0.9 % injection 5-40 mL, 5-40 mL, IntraVENous, PRN  0.9 % sodium chloride infusion, 25 mL, IntraVENous, PRN  ondansetron (ZOFRAN-ODT) disintegrating tablet 4 mg, 4 mg, Oral, Q8H PRN **OR** ondansetron (ZOFRAN) injection 4 mg, 4 mg, IntraVENous, Q6H PRN  enoxaparin (LOVENOX) injection 40 mg, 40 mg, SubCUTAneous, Daily  0.9 % sodium chloride infusion, , IntraVENous, Continuous  oxyCODONE (ROXICODONE) immediate release tablet 5 mg, 5 mg, Oral, Q4H PRN **OR** oxyCODONE (ROXICODONE) immediate release tablet 10 mg, 10 mg, Oral, Q4H PRN  morphine (PF) injection 2 mg, 2 mg, IntraVENous, Q2H PRN **OR** morphine injection 4 mg, 4 mg, IntraVENous, Q2H PRN  piperacillin-tazobactam (ZOSYN) 3,375 mg in dextrose 5 % 50 mL IVPB extended infusion (mini-bag), 3,375 mg, IntraVENous, Q8H  atorvastatin (LIPITOR) tablet 40 mg, 40 mg, Oral, Daily  insulin glargine (LANTUS) injection vial 45 Units, 45 Units, SubCUTAneous, Nightly  nystatin (MYCOSTATIN) 207345 UNIT/ML suspension 500,000 Units, 5 mL, Oral, 4x Daily  insulin lispro (HUMALOG) injection vial 0-6 Units, 0-6 Units, SubCUTAneous, TID WC  insulin lispro (HUMALOG) injection vial 0-3 Units, 0-3 Units, SubCUTAneous, Nightly  glucose (GLUTOSE) 40 % oral gel 15 g, 15 g, Oral, PRN  glucagon (rDNA) injection 1 mg, 1 mg, IntraMUSCular, PRN  dextrose 5 % solution, 100 mL/hr, IntraVENous, PRN  dextrose 10 % infusion, 125 mL, IntraVENous, PRN **OR** dextrose 10 % infusion, 250 mL, IntraVENous, PRN  hydrALAZINE (APRESOLINE) injection 5 mg, 5 mg, IntraVENous, Q4H PRN  Physical    VITALS:  BP (!) 140/76   Pulse 100   Temp 98.6 °F (37 °C) (Oral)   Resp 18   Ht 6' 2\" (1.88 m)   Wt (!) 316 lb 1.6 oz (143.4 kg)   SpO2 95%   BMI 40.58 kg/m²   CONSTITUTIONAL:  awake and alert  EYES:  extra-ocular muscles intact and vision intact  ENT:  atraumatic  NECK:  skin normal  LUNGS:  no increased work of breathing, no retractions and clear to auscultation  CARDIOVASCULAR:  normal apical pulses, normal S1 and S2 and chronic venous dermatitis  ABDOMEN:  normal bowel sounds, non-distended and non-tender  MUSCULOSKELETAL:  there is no redness, warmth, or swelling of the joints  NEUROLOGIC:  Mental Status Exam:  Level of Alertness:   awake  Orientation:   person, place, time  SKIN:  no bruising or bleeding  Data    CBC:   Lab Results   Component Value Date    WBC 14.4 03/07/2022    RBC 4.71 03/07/2022    HGB 12.9 03/07/2022    HCT 39.6 03/07/2022    MCV 84.1 03/07/2022    MCH 27.4 03/07/2022    MCHC 32.6 03/07/2022    RDW 13.1 03/07/2022     03/07/2022    MPV 9.7 03/07/2022     BMP:    Lab Results   Component Value Date     03/07/2022    K 3.6 03/07/2022    CL 95 03/07/2022    CO2 22 03/07/2022    BUN 7 03/07/2022    LABALBU 2.8 03/05/2022    LABALBU 4.4 04/01/2011    CREATININE 0.7 03/07/2022    CALCIUM 7.9 03/07/2022    GFRAA >60 03/07/2022    LABGLOM >60 03/07/2022    GLUCOSE 243 03/07/2022    GLUCOSE 191 04/01/2011       ASSESSMENT AND PLAN      1. Intra-abdominal abscess:  IR for abscess drainage ordered  Pain control  Continue IV fluid  Repeat CT in am per Surgery recommendations    2. Hyponatremia:   Continue IV fluid  Bmp in am    3.   Poorly controlled DM type 2 in Obese patient:  Continue current insulin dose  Added 5 units of Humalog with sliding scale  Continue diet per gen surg recommendations    4.   Hyperlipidemia: on statin

## 2022-03-08 ENCOUNTER — APPOINTMENT (OUTPATIENT)
Dept: CT IMAGING | Age: 57
DRG: 248 | End: 2022-03-08
Attending: SURGERY
Payer: COMMERCIAL

## 2022-03-08 LAB
ANION GAP SERPL CALCULATED.3IONS-SCNC: 11 MMOL/L (ref 7–16)
BASOPHILS ABSOLUTE: 0.05 E9/L (ref 0–0.2)
BASOPHILS RELATIVE PERCENT: 0.3 % (ref 0–2)
BUN BLDV-MCNC: 4 MG/DL (ref 6–20)
CALCIUM SERPL-MCNC: 7.6 MG/DL (ref 8.6–10.2)
CHLORIDE BLD-SCNC: 96 MMOL/L (ref 98–107)
CO2: 23 MMOL/L (ref 22–29)
CREAT SERPL-MCNC: 0.7 MG/DL (ref 0.7–1.2)
EOSINOPHILS ABSOLUTE: 0.1 E9/L (ref 0.05–0.5)
EOSINOPHILS RELATIVE PERCENT: 0.7 % (ref 0–6)
GFR AFRICAN AMERICAN: >60
GFR NON-AFRICAN AMERICAN: >60 ML/MIN/1.73
GLUCOSE BLD-MCNC: 237 MG/DL (ref 74–99)
HCT VFR BLD CALC: 39.4 % (ref 37–54)
HEMOGLOBIN: 13 G/DL (ref 12.5–16.5)
IMMATURE GRANULOCYTES #: 0.19 E9/L
IMMATURE GRANULOCYTES %: 1.3 % (ref 0–5)
INR BLD: 1.6
LYMPHOCYTES ABSOLUTE: 1.03 E9/L (ref 1.5–4)
LYMPHOCYTES RELATIVE PERCENT: 6.8 % (ref 20–42)
MCH RBC QN AUTO: 28 PG (ref 26–35)
MCHC RBC AUTO-ENTMCNC: 33 % (ref 32–34.5)
MCV RBC AUTO: 84.7 FL (ref 80–99.9)
METER GLUCOSE: 197 MG/DL (ref 74–99)
METER GLUCOSE: 205 MG/DL (ref 74–99)
METER GLUCOSE: 208 MG/DL (ref 74–99)
MONOCYTES ABSOLUTE: 1.41 E9/L (ref 0.1–0.95)
MONOCYTES RELATIVE PERCENT: 9.4 % (ref 2–12)
NEUTROPHILS ABSOLUTE: 12.29 E9/L (ref 1.8–7.3)
NEUTROPHILS RELATIVE PERCENT: 81.5 % (ref 43–80)
PDW BLD-RTO: 13.3 FL (ref 11.5–15)
PLATELET # BLD: 378 E9/L (ref 130–450)
PMV BLD AUTO: 9.4 FL (ref 7–12)
POTASSIUM REFLEX MAGNESIUM: 3.6 MMOL/L (ref 3.5–5)
PROTHROMBIN TIME: 18.8 SEC (ref 9.3–12.4)
RBC # BLD: 4.65 E12/L (ref 3.8–5.8)
SODIUM BLD-SCNC: 130 MMOL/L (ref 132–146)
URINE CULTURE, ROUTINE: NORMAL
WBC # BLD: 15.1 E9/L (ref 4.5–11.5)

## 2022-03-08 PROCEDURE — 1200000000 HC SEMI PRIVATE

## 2022-03-08 PROCEDURE — 6360000002 HC RX W HCPCS: Performed by: SURGERY

## 2022-03-08 PROCEDURE — 6360000004 HC RX CONTRAST MEDICATION: Performed by: RADIOLOGY

## 2022-03-08 PROCEDURE — 99232 SBSQ HOSP IP/OBS MODERATE 35: CPT | Performed by: INTERNAL MEDICINE

## 2022-03-08 PROCEDURE — 74177 CT ABD & PELVIS W/CONTRAST: CPT

## 2022-03-08 PROCEDURE — 99232 SBSQ HOSP IP/OBS MODERATE 35: CPT | Performed by: SURGERY

## 2022-03-08 PROCEDURE — 2580000003 HC RX 258: Performed by: INTERNAL MEDICINE

## 2022-03-08 PROCEDURE — 85610 PROTHROMBIN TIME: CPT

## 2022-03-08 PROCEDURE — 80048 BASIC METABOLIC PNL TOTAL CA: CPT

## 2022-03-08 PROCEDURE — 6370000000 HC RX 637 (ALT 250 FOR IP): Performed by: INTERNAL MEDICINE

## 2022-03-08 PROCEDURE — 82962 GLUCOSE BLOOD TEST: CPT

## 2022-03-08 PROCEDURE — 85025 COMPLETE CBC W/AUTO DIFF WBC: CPT

## 2022-03-08 PROCEDURE — 2580000003 HC RX 258: Performed by: SURGERY

## 2022-03-08 PROCEDURE — 36415 COLL VENOUS BLD VENIPUNCTURE: CPT

## 2022-03-08 RX ADMIN — SODIUM CHLORIDE: 9 INJECTION, SOLUTION INTRAVENOUS at 09:57

## 2022-03-08 RX ADMIN — INSULIN LISPRO 2 UNITS: 100 INJECTION, SOLUTION INTRAVENOUS; SUBCUTANEOUS at 11:16

## 2022-03-08 RX ADMIN — NYSTATIN 500000 UNITS: 100000 SUSPENSION ORAL at 20:10

## 2022-03-08 RX ADMIN — PIPERACILLIN SODIUM AND TAZOBACTAM SODIUM 3375 MG: 3; 375 INJECTION, POWDER, FOR SOLUTION INTRAVENOUS at 03:55

## 2022-03-08 RX ADMIN — INSULIN LISPRO 5 UNITS: 100 INJECTION, SOLUTION INTRAVENOUS; SUBCUTANEOUS at 08:24

## 2022-03-08 RX ADMIN — INSULIN LISPRO 5 UNITS: 100 INJECTION, SOLUTION INTRAVENOUS; SUBCUTANEOUS at 16:17

## 2022-03-08 RX ADMIN — PIPERACILLIN SODIUM AND TAZOBACTAM SODIUM 3375 MG: 3; 375 INJECTION, POWDER, FOR SOLUTION INTRAVENOUS at 11:14

## 2022-03-08 RX ADMIN — IOPAMIDOL 75 ML: 755 INJECTION, SOLUTION INTRAVENOUS at 09:22

## 2022-03-08 RX ADMIN — INSULIN LISPRO 2 UNITS: 100 INJECTION, SOLUTION INTRAVENOUS; SUBCUTANEOUS at 08:25

## 2022-03-08 RX ADMIN — INSULIN GLARGINE 45 UNITS: 100 INJECTION, SOLUTION SUBCUTANEOUS at 20:10

## 2022-03-08 RX ADMIN — INSULIN LISPRO 5 UNITS: 100 INJECTION, SOLUTION INTRAVENOUS; SUBCUTANEOUS at 11:14

## 2022-03-08 RX ADMIN — NYSTATIN 500000 UNITS: 100000 SUSPENSION ORAL at 16:17

## 2022-03-08 RX ADMIN — INSULIN LISPRO 1 UNITS: 100 INJECTION, SOLUTION INTRAVENOUS; SUBCUTANEOUS at 16:18

## 2022-03-08 RX ADMIN — PIPERACILLIN SODIUM AND TAZOBACTAM SODIUM 3375 MG: 3; 375 INJECTION, POWDER, FOR SOLUTION INTRAVENOUS at 18:30

## 2022-03-08 RX ADMIN — INSULIN LISPRO 1 UNITS: 100 INJECTION, SOLUTION INTRAVENOUS; SUBCUTANEOUS at 20:11

## 2022-03-08 RX ADMIN — IOHEXOL 50 ML: 240 INJECTION, SOLUTION INTRATHECAL; INTRAVASCULAR; INTRAVENOUS; ORAL at 09:22

## 2022-03-08 RX ADMIN — SODIUM CHLORIDE: 9 INJECTION, SOLUTION INTRAVENOUS at 22:34

## 2022-03-08 ASSESSMENT — PAIN SCALES - GENERAL: PAINLEVEL_OUTOF10: 0

## 2022-03-08 NOTE — PROGRESS NOTES
Infectious Disease  t Note     Admit Date: 3/6/2022  1:45 AM    Chief complaint: Abdominal pain    Reason for Consult: Appendicular abscess    Requesting Physician:  Leigh Gastelum MD      HISTORY OF PRESENT ILLNESS:    Odalis Okeefe is a 64 y.o. male who presents for evaluation of right lower quadrant pain which patient reports started a few days ago. He has some mild discomfort in the area and thought it was a gas bubble however presented to the emergency room yesterday when it did not improve. He denies any fevers or chills. He had a CT scan done in the emergency room that revealed a large right lower quadrant abscess. He was transferred to [place] for admission and further management. He denies any recent blood in the stool, no recent weight loss. He has no family history of colon cancer. He has never had a colonoscopy. Odalis Okeefe is a 64 y.o. male who presents for evaluation of right lower quadrant pain which patient reports started a few days ago. He has some mild discomfort in the area and thought it was a gas bubble however presented to the emergency room yesterday when it did not improve. He denies any fevers or chills. He had a CT scan done in the emergency room that revealed a large right lower quadrant abscess. He was transferred to [place] for admission and further management. He denies any recent blood in the stool, no recent weight loss. He has no family history of colon cancer. He has never had a colonoscopy.     Infectious disease consulted mainly for further antibiotic recommendation    DOS  03/08/22   In chair for CT aspiration still with pan n o issues with meds  3/7/2022  On his side no c/o  Still with lower abd pain    REVIEW OF SYSTEMS:    No f/c/n/v/d/rash/itch     MEDICAL HISTORY  Past Medical History:   Diagnosis Date    Diabetes mellitus ([address]     Gangrene of toe of left foot ([facility] [address] 11/17/2017    Hyperlipidemia     Hyperlipidemia     Sleep apnea     Vitamin D deficiency       Immunization History   Administered Date(s) Administered    COVID-19, Pfizer Purple top, DILUTE for use, 12+ yrs, 30mcg/0.3mL dose 05/27/2021, 06/17/2021    Influenza Virus Vaccine 10/03/2019    Influenza, MDCK Quadv, with preserv IM (Flucelvax 2 yrs and older) 10/04/2019    Influenza, Quadv, IM, PF (6 mo and older Fluzone, Flulaval, Fluarix, and 3 yrs and older Afluria) 11/20/2017     Past Surgical History:   Procedure Laterality Date    OTHER SURGICAL HISTORY Left 11/17/2017    Amputation of left great toe.     TOE AMPUTATION      TOE AMPUTATION Left 10/21/2019    LEFT SECOND TOE AMPUTATION performed by Iliana Flores DPM at [address]        Current Medications:     Scheduled Meds:   insulin lispro  5 Units SubCUTAneous TID WC    sodium chloride flush  5-40 mL IntraVENous 2 times per day    enoxaparin  40 mg SubCUTAneous Daily    piperacillin-tazobactam  3,375 mg IntraVENous Q8H    atorvastatin  40 mg Oral Daily    insulin glargine  45 Units SubCUTAneous Nightly    nystatin  5 mL Oral 4x Daily    insulin lispro  0-6 Units SubCUTAneous TID WC    insulin lispro  0-3 Units SubCUTAneous Nightly     Continuous Infusions:   sodium chloride      sodium chloride 75 mL/hr at 03/08/22 0957    dextrose      dextrose      Or    dextrose       PRN Meds:sodium chloride flush, sodium chloride, ondansetron **OR** ondansetron, oxyCODONE **OR** oxyCODONE, morphine **OR** morphine, glucose, glucagon (rDNA), dextrose, dextrose **OR** dextrose, hydrALAZINE      PHYSICAL EXAM:  Vitals:    03/07/22 0743 03/07/22 1235 03/07/22 1823 03/08/22 0600   BP:   133/76 127/78   Pulse: 100  94 91   Resp:   18 18   Temp:   99.9 °F (37.7 °C) 98.9 °F (37.2 °C)   TempSrc:   Oral Oral   SpO2: 95%   95%   Weight:       Height:  6' 2\" (1.88 m)         General Appearance:       Alert, cooperative, no distress,  inc bmi        Heent:    Normocephalic, atraumatic,             Back:     Symmetric, no CVA tenderness   Lungs:     Clear to auscultation bilaterally        Heart:    Regular rate and rhythm, S1 and S2 normal   Abdomen:    Soft with tenderness at right lower quadrant bowel         Extremities:   Extremities normal, atraumatic, no cyanosis or edema       Skin:   Skin color, texture, turgor normal, no rashes or lesions birthmarck right cheek   Lymph nodes:   Cervical, supraclavicular, and axillary nodes normal   Neurologic:   CNII-XII intact, no focal deficits    LINES : Peripheral    SIERRA: Absent       LABS AND DATA REVIEW:     Recent Labs     03/06/22  0330 03/07/22  0525 03/08/22  0438   WBC 17.8* 14.4* 15.1*   HGB 13.0 12.9 13.0   HCT 40.2 39.6 39.4   MCV 85.0 84.1 84.7    386 378     Recent Labs     03/05/22  1630 03/05/22  1630 03/05/22  1631 03/06/22  0856 03/07/22  0525 03/07/22  0525 03/08/22  0438   *  --    < > 128* 129*  --  130*   K 4.2   < >  --  4.1 3.6   < > 3.6   CL 90*   < >   < > 95* 95*   < > 96*   CO2 18*   < >   < > 21* 22   < > 23   BUN 10  --    < > 9 7  --  4*   CREATININE 0.8  --    < > 0.7 0.7  --  0.7   GFRAA >60  --    < > >60 >60  --  >60   LABGLOM >60  --    < > >60 >60  --  >60   GLUCOSE 307*  --    < > 148* 243*  --  237*   PROT 7.8  --   --   --   --   --   --    LABALBU 2.8*  --   --   --   --   --   --    CALCIUM 9.0  --    < > 8.2* 7.9*  --  7.6*   BILITOT 0.9  --   --   --   --   --   --    ALKPHOS 127  --   --   --   --   --   --    AST 25  --   --   --   --   --   --    ALT 18  --   --   --   --   --   --     < > = values in this interval not displayed.           Recent Labs     03/05/22  1631   LABURIN <10,000 CFU/mL  Mixed gram positive organisms           ASSESSMENT & PLAN    72-year-old male admitted to the hospital with severe abdominal pain and on further work-up was found to have appendicular abscess 9x10 cm   Leukocytosis      For CT-guided drainage of an appendicular abscess check cx     piperacillin-tazobactam (ZOSYN) 3,375 mg in dextrose 5 % 50 mL IVPB extended infusion (mini-bag), Q8H      follow labs  Imaging and labs were reviewed. Jan Jacinto was informed of their diagnosis, indications, risks and benefits of treatment. Jan Jacinto had the opportunity to ask questions. All questions were answered. Thank you for involving me in the care of Jan Jacinto. Please do not hesitate to call for any questions or concerns.     Electronically signed by Zach Bowie MD on 3/8/2022 at 10:01 AM

## 2022-03-08 NOTE — PROGRESS NOTES
infusion, 125 mL, IntraVENous, PRN **OR** dextrose 10 % infusion, 250 mL, IntraVENous, PRN  hydrALAZINE (APRESOLINE) injection 5 mg, 5 mg, IntraVENous, Q4H PRN  Physical    VITALS:  /78   Pulse 91   Temp 98.9 °F (37.2 °C) (Oral)   Resp 18   Ht 6' 2\" (1.88 m)   Wt (!) 316 lb 1.6 oz (143.4 kg)   SpO2 95%   BMI 40.58 kg/m²   CONSTITUTIONAL:  awake  EYES:  extra-ocular muscles intact and vision intact  ENT:  normocepalic, without obvious abnormality  NECK:  supple, symmetrical, trachea midline  LUNGS:  no increased work of breathing, no crackles or wheezing and normal percussion bilaterally  CARDIOVASCULAR:  normal apical pulses and normal S1 and S2  ABDOMEN:  normal bowel sounds, non-distended and non-tender  MUSCULOSKELETAL:  there is no redness, warmth, or swelling of the joints  NEUROLOGIC:  Mental Status Exam:  Level of Alertness:   awake  Orientation:   person, place, time  Motor Exam:  Motor exam is symmetrical 5 out of 5 all extremities bilaterally  SKIN:  no bruising or bleeding  Data    CBC:   Lab Results   Component Value Date    WBC 15.1 03/08/2022    RBC 4.65 03/08/2022    HGB 13.0 03/08/2022    HCT 39.4 03/08/2022    MCV 84.7 03/08/2022    MCH 28.0 03/08/2022    MCHC 33.0 03/08/2022    RDW 13.3 03/08/2022     03/08/2022    MPV 9.4 03/08/2022     BMP:    Lab Results   Component Value Date     03/08/2022    K 3.6 03/08/2022    CL 96 03/08/2022    CO2 23 03/08/2022    BUN 4 03/08/2022    LABALBU 2.8 03/05/2022    LABALBU 4.4 04/01/2011    CREATININE 0.7 03/08/2022    CALCIUM 7.6 03/08/2022    GFRAA >60 03/08/2022    LABGLOM >60 03/08/2022    GLUCOSE 237 03/08/2022    GLUCOSE 191 04/01/2011       ASSESSMENT AND PLAN      1. Intra-abdominal abscess (HCC)  Pain control  Continue IV Zosyn  Diet per gen surgery recommendation  Please culture abscess if any is obtained from IR Drainage  ID following    2.   DM type 2 in morbidly obese Patient:  On Humalog 5 units with Meal and 45 Units of Lantus QHS  This is lower than patient's home dose  Will adjust when patient is back on full diet  Continue to monitor BGL with sliding scale    3. Hyponatremia: etiology unknown  Continue IV fluid  Noted improved Na  Monitor renal Panel    4.   Hyperlipidemia: continue statin

## 2022-03-08 NOTE — PROGRESS NOTES
GENERAL SURGERY  DAILY PROGRESS NOTE  3/8/2022    CC: RLQ phlegm     Subjective:  No acute issues overnight. No nausea no vomiting. Patient still with right lower quadrant abdominal tenderness/fullness. No gas/bowel movements    Objective:  /78   Pulse 91   Temp 98.9 °F (37.2 °C) (Oral)   Resp 18   Ht 6' 2\" (1.88 m)   Wt (!) 316 lb 1.6 oz (143.4 kg)   SpO2 95%   BMI 40.58 kg/m²     GENERAL:  Laying in bed, awake, alert, cooperative, no apparent distress  LUNGS:  No increased work of breathing  CARDIOVASCULAR:  RR  ABDOMEN:  Soft, mild tenderness , non-distended  EXTREMITIES: No edema or swelling  SKIN: Warm and dry    Assessment/Plan:  64 y.o. male with large right lower quadrant phlegmon/abscess concerning for perforated appendicitis    Examined  Continue antibiotics  N.p.o.  INR for possible IR procedure today  We will repeat CT with p.o. and IV contrast today    Electronically signed by Ravi Phelps DO on 3/8/2022 at 7:03 AM     General Surgery Progress Note  [facility]    Patient's Name/Date of Birth: Flores Sargent / 1965    Date: March 9, 2022     Surgeon: Pascual Rojas MD    Chief Complaint: intraabdominal abscess    Patient Active Problem List   Diagnosis    Diabetic foot ulcer ([address]    Diabetes mellitus ([address]    Osteomyelitis of ankle or foot, left, acute ([address]    Cellulitis of foot    Diabetic arthropathy ([address]    Gangrene of toe of left foot ([address]    Morbid (severe) obesity due to excess calories ([address]    Pure hypertriglyceridemia    Diabetic foot infection ([address]    DVT prophylaxis    ISAIAS (acute kidney injury) ([address]    Intra-abdominal abscess ([address]       Subjective: had CT abd/pel today. Awaiting read.  Looks more abscess    Objective:  BP (!) 105/54   Pulse 87   Temp 97.9 °F (36.6 °C) (Oral)   Resp 18   Ht 6' 2\" (1.88 m)   Wt (!) 316 lb 1.6 oz (143.4 kg)   SpO2 95%   BMI 40.58 kg/m²   Labs:  Recent Labs     03/07/22  0525 03/08/22  0438 03/09/22  0410   WBC 14.4* 15.1* 16.2*   HGB 12.9 13.0 12.5   HCT 39.6 39.4 38.6     Lab Results   Component Value Date    CREATININE 0.7 03/09/2022    BUN 4 (L) 03/09/2022     (L) 03/09/2022    K 3.5 03/09/2022    CL 97 (L) 03/09/2022    CO2 23 03/09/2022     No results for input(s): LIPASE, AMYLASE in the last 72 hours.   CBC with Differential:    Lab Results   Component Value Date    WBC 16.2 03/09/2022    RBC 4.51 03/09/2022    HGB 12.5 03/09/2022    HCT 38.6 03/09/2022     03/09/2022    MCV 85.6 03/09/2022    MCH 27.7 03/09/2022    MCHC 32.4 03/09/2022    RDW 13.4 03/09/2022    LYMPHOPCT 9.2 03/09/2022    MONOPCT 8.9 03/09/2022    BASOPCT 0.4 03/09/2022    MONOSABS 1.44 03/09/2022    LYMPHSABS 1.49 03/09/2022    EOSABS 0.16 03/09/2022    BASOSABS 0.07 03/09/2022     CMP:    Lab Results   Component Value Date     03/09/2022    K 3.5 03/09/2022    CL 97 03/09/2022    CO2 23 03/09/2022    BUN 4 03/09/2022    CREATININE 0.7 03/09/2022    GFRAA >60 03/09/2022    LABGLOM >60 03/09/2022    GLUCOSE 174 03/09/2022    GLUCOSE 191 04/01/2011    PROT 7.8 03/05/2022    LABALBU 2.8 03/05/2022    LABALBU 4.4 04/01/2011    CALCIUM 7.9 03/09/2022    BILITOT 0.9 03/05/2022    ALKPHOS 127 03/05/2022    AST 25 03/05/2022    ALT 18 03/05/2022       General appearance:  NAD  Head: NCAT, PERRLA, EOMI, red conjunctiva  Neck: supple, no masses  Lungs: CTAB, equal chest rise bilateral  Heart: Reg rate  Abdomen: soft, nondistended, tender mild rlq, no peritonitis  Skin; no lesions  Gu: no cva tenderness  Extremities: extremities normal, atraumatic, no cyanosis or edema      Assessment/Plan:  Latoya Guerrero is a 64 y.o. male large right lower quadrant phlegmon/abscess concerning for perforated appendicitis with abscess    Continue IV antibiotics  Infectious disease recommendations appreciated  NPO  Repeat CT abdomen pelvis with p.o. and IV contrast today -> IR drain    Bing Colon MD

## 2022-03-09 ENCOUNTER — APPOINTMENT (OUTPATIENT)
Dept: INTERVENTIONAL RADIOLOGY/VASCULAR | Age: 57
DRG: 248 | End: 2022-03-09
Attending: SURGERY
Payer: COMMERCIAL

## 2022-03-09 LAB
ANION GAP SERPL CALCULATED.3IONS-SCNC: 11 MMOL/L (ref 7–16)
BASOPHILS ABSOLUTE: 0.07 E9/L (ref 0–0.2)
BASOPHILS RELATIVE PERCENT: 0.4 % (ref 0–2)
BUN BLDV-MCNC: 4 MG/DL (ref 6–20)
CALCIUM SERPL-MCNC: 7.9 MG/DL (ref 8.6–10.2)
CHLORIDE BLD-SCNC: 97 MMOL/L (ref 98–107)
CO2: 23 MMOL/L (ref 22–29)
CREAT SERPL-MCNC: 0.7 MG/DL (ref 0.7–1.2)
EOSINOPHILS ABSOLUTE: 0.16 E9/L (ref 0.05–0.5)
EOSINOPHILS RELATIVE PERCENT: 1 % (ref 0–6)
GFR AFRICAN AMERICAN: >60
GFR NON-AFRICAN AMERICAN: >60 ML/MIN/1.73
GLUCOSE BLD-MCNC: 174 MG/DL (ref 74–99)
HCT VFR BLD CALC: 38.6 % (ref 37–54)
HEMOGLOBIN: 12.5 G/DL (ref 12.5–16.5)
IMMATURE GRANULOCYTES #: 0.21 E9/L
IMMATURE GRANULOCYTES %: 1.3 % (ref 0–5)
LYMPHOCYTES ABSOLUTE: 1.49 E9/L (ref 1.5–4)
LYMPHOCYTES RELATIVE PERCENT: 9.2 % (ref 20–42)
MAGNESIUM: 2.2 MG/DL (ref 1.6–2.6)
MCH RBC QN AUTO: 27.7 PG (ref 26–35)
MCHC RBC AUTO-ENTMCNC: 32.4 % (ref 32–34.5)
MCV RBC AUTO: 85.6 FL (ref 80–99.9)
METER GLUCOSE: 168 MG/DL (ref 74–99)
METER GLUCOSE: 184 MG/DL (ref 74–99)
METER GLUCOSE: 196 MG/DL (ref 74–99)
METER GLUCOSE: 217 MG/DL (ref 74–99)
MONOCYTES ABSOLUTE: 1.44 E9/L (ref 0.1–0.95)
MONOCYTES RELATIVE PERCENT: 8.9 % (ref 2–12)
NEUTROPHILS ABSOLUTE: 12.83 E9/L (ref 1.8–7.3)
NEUTROPHILS RELATIVE PERCENT: 79.2 % (ref 43–80)
PDW BLD-RTO: 13.4 FL (ref 11.5–15)
PLATELET # BLD: 413 E9/L (ref 130–450)
PMV BLD AUTO: 9.3 FL (ref 7–12)
POTASSIUM REFLEX MAGNESIUM: 3.5 MMOL/L (ref 3.5–5)
RBC # BLD: 4.51 E12/L (ref 3.8–5.8)
SODIUM BLD-SCNC: 131 MMOL/L (ref 132–146)
WBC # BLD: 16.2 E9/L (ref 4.5–11.5)

## 2022-03-09 PROCEDURE — 87186 SC STD MICRODIL/AGAR DIL: CPT

## 2022-03-09 PROCEDURE — 83735 ASSAY OF MAGNESIUM: CPT

## 2022-03-09 PROCEDURE — 76937 US GUIDE VASCULAR ACCESS: CPT

## 2022-03-09 PROCEDURE — 87205 SMEAR GRAM STAIN: CPT

## 2022-03-09 PROCEDURE — 99232 SBSQ HOSP IP/OBS MODERATE 35: CPT | Performed by: INTERNAL MEDICINE

## 2022-03-09 PROCEDURE — 82962 GLUCOSE BLOOD TEST: CPT

## 2022-03-09 PROCEDURE — 99232 SBSQ HOSP IP/OBS MODERATE 35: CPT | Performed by: SURGERY

## 2022-03-09 PROCEDURE — 87077 CULTURE AEROBIC IDENTIFY: CPT

## 2022-03-09 PROCEDURE — 6360000002 HC RX W HCPCS: Performed by: SURGERY

## 2022-03-09 PROCEDURE — C1751 CATH, INF, PER/CENT/MIDLINE: HCPCS

## 2022-03-09 PROCEDURE — 87147 CULTURE TYPE IMMUNOLOGIC: CPT

## 2022-03-09 PROCEDURE — 6370000000 HC RX 637 (ALT 250 FOR IP): Performed by: INTERNAL MEDICINE

## 2022-03-09 PROCEDURE — 2580000003 HC RX 258: Performed by: SPECIALIST

## 2022-03-09 PROCEDURE — 6360000002 HC RX W HCPCS: Performed by: SPECIALIST

## 2022-03-09 PROCEDURE — C1729 CATH, DRAINAGE: HCPCS

## 2022-03-09 PROCEDURE — 02HV33Z INSERTION OF INFUSION DEVICE INTO SUPERIOR VENA CAVA, PERCUTANEOUS APPROACH: ICD-10-PCS | Performed by: SURGERY

## 2022-03-09 PROCEDURE — 49406 IMAGE CATH FLUID PERI/RETRO: CPT

## 2022-03-09 PROCEDURE — 36569 INSJ PICC 5 YR+ W/O IMAGING: CPT

## 2022-03-09 PROCEDURE — 1200000000 HC SEMI PRIVATE

## 2022-03-09 PROCEDURE — 6360000002 HC RX W HCPCS: Performed by: RADIOLOGY

## 2022-03-09 PROCEDURE — 2580000003 HC RX 258: Performed by: SURGERY

## 2022-03-09 PROCEDURE — 2580000003 HC RX 258: Performed by: INTERNAL MEDICINE

## 2022-03-09 PROCEDURE — 87070 CULTURE OTHR SPECIMN AEROBIC: CPT

## 2022-03-09 PROCEDURE — 85025 COMPLETE CBC W/AUTO DIFF WBC: CPT

## 2022-03-09 PROCEDURE — 2720000010 HC SURG SUPPLY STERILE

## 2022-03-09 PROCEDURE — 80048 BASIC METABOLIC PNL TOTAL CA: CPT

## 2022-03-09 PROCEDURE — 36415 COLL VENOUS BLD VENIPUNCTURE: CPT

## 2022-03-09 RX ORDER — SODIUM CHLORIDE 9 MG/ML
25 INJECTION, SOLUTION INTRAVENOUS PRN
Status: DISCONTINUED | OUTPATIENT
Start: 2022-03-09 | End: 2022-03-11 | Stop reason: HOSPADM

## 2022-03-09 RX ORDER — HEPARIN SODIUM (PORCINE) LOCK FLUSH IV SOLN 100 UNIT/ML 100 UNIT/ML
3 SOLUTION INTRAVENOUS EVERY 12 HOURS SCHEDULED
Status: DISCONTINUED | OUTPATIENT
Start: 2022-03-09 | End: 2022-03-11 | Stop reason: HOSPADM

## 2022-03-09 RX ORDER — FENTANYL CITRATE 0.05 MG/ML
INJECTION, SOLUTION INTRAMUSCULAR; INTRAVENOUS
Status: COMPLETED | OUTPATIENT
Start: 2022-03-09 | End: 2022-03-09

## 2022-03-09 RX ORDER — MIDAZOLAM HYDROCHLORIDE 1 MG/ML
INJECTION INTRAMUSCULAR; INTRAVENOUS
Status: COMPLETED | OUTPATIENT
Start: 2022-03-09 | End: 2022-03-09

## 2022-03-09 RX ORDER — HEPARIN SODIUM (PORCINE) LOCK FLUSH IV SOLN 100 UNIT/ML 100 UNIT/ML
3 SOLUTION INTRAVENOUS PRN
Status: DISCONTINUED | OUTPATIENT
Start: 2022-03-09 | End: 2022-03-11 | Stop reason: HOSPADM

## 2022-03-09 RX ORDER — SODIUM CHLORIDE 0.9 % (FLUSH) 0.9 %
5-40 SYRINGE (ML) INJECTION PRN
Status: DISCONTINUED | OUTPATIENT
Start: 2022-03-09 | End: 2022-03-11 | Stop reason: HOSPADM

## 2022-03-09 RX ORDER — SODIUM CHLORIDE 0.9 % (FLUSH) 0.9 %
5-40 SYRINGE (ML) INJECTION EVERY 12 HOURS SCHEDULED
Status: DISCONTINUED | OUTPATIENT
Start: 2022-03-09 | End: 2022-03-11 | Stop reason: HOSPADM

## 2022-03-09 RX ADMIN — INSULIN LISPRO 5 UNITS: 100 INJECTION, SOLUTION INTRAVENOUS; SUBCUTANEOUS at 16:39

## 2022-03-09 RX ADMIN — Medication 10 ML: at 22:13

## 2022-03-09 RX ADMIN — PIPERACILLIN SODIUM AND TAZOBACTAM SODIUM 3375 MG: 3; 375 INJECTION, POWDER, FOR SOLUTION INTRAVENOUS at 22:23

## 2022-03-09 RX ADMIN — INSULIN LISPRO 1 UNITS: 100 INJECTION, SOLUTION INTRAVENOUS; SUBCUTANEOUS at 21:19

## 2022-03-09 RX ADMIN — INSULIN LISPRO 1 UNITS: 100 INJECTION, SOLUTION INTRAVENOUS; SUBCUTANEOUS at 11:54

## 2022-03-09 RX ADMIN — INSULIN LISPRO 5 UNITS: 100 INJECTION, SOLUTION INTRAVENOUS; SUBCUTANEOUS at 11:55

## 2022-03-09 RX ADMIN — FENTANYL CITRATE 50 MCG: 50 INJECTION INTRAMUSCULAR; INTRAVENOUS at 09:44

## 2022-03-09 RX ADMIN — NYSTATIN 500000 UNITS: 100000 SUSPENSION ORAL at 16:37

## 2022-03-09 RX ADMIN — PIPERACILLIN SODIUM AND TAZOBACTAM SODIUM 3375 MG: 3; 375 INJECTION, POWDER, FOR SOLUTION INTRAVENOUS at 03:47

## 2022-03-09 RX ADMIN — INSULIN GLARGINE 45 UNITS: 100 INJECTION, SOLUTION SUBCUTANEOUS at 21:18

## 2022-03-09 RX ADMIN — PIPERACILLIN SODIUM AND TAZOBACTAM SODIUM 3375 MG: 3; 375 INJECTION, POWDER, FOR SOLUTION INTRAVENOUS at 14:47

## 2022-03-09 RX ADMIN — HEPARIN 300 UNITS: 100 SYRINGE at 21:21

## 2022-03-09 RX ADMIN — NYSTATIN 500000 UNITS: 100000 SUSPENSION ORAL at 21:17

## 2022-03-09 RX ADMIN — SODIUM CHLORIDE: 9 INJECTION, SOLUTION INTRAVENOUS at 16:38

## 2022-03-09 RX ADMIN — INSULIN LISPRO 2 UNITS: 100 INJECTION, SOLUTION INTRAVENOUS; SUBCUTANEOUS at 16:37

## 2022-03-09 RX ADMIN — NYSTATIN 500000 UNITS: 100000 SUSPENSION ORAL at 12:00

## 2022-03-09 RX ADMIN — Medication 10 ML: at 22:14

## 2022-03-09 RX ADMIN — MIDAZOLAM 1 MG: 1 INJECTION INTRAMUSCULAR; INTRAVENOUS at 09:44

## 2022-03-09 ASSESSMENT — PAIN SCALES - GENERAL
PAINLEVEL_OUTOF10: 0

## 2022-03-09 NOTE — PROGRESS NOTES
Department of Internal Medicine  General Internal Medicine  Attending Progress Note  CC: abdominal Pain    SUBJECTIVE:    Reports that he is doing well. No fever and chills. No chest pain. Aware of plan for IR Drainage of abscess today.      OBJECTIVE      Medications    Current Facility-Administered Medications: insulin lispro (HUMALOG) injection vial 5 Units, 5 Units, SubCUTAneous, TID WC  sodium chloride flush 0.9 % injection 5-40 mL, 5-40 mL, IntraVENous, 2 times per day  sodium chloride flush 0.9 % injection 5-40 mL, 5-40 mL, IntraVENous, PRN  0.9 % sodium chloride infusion, 25 mL, IntraVENous, PRN  ondansetron (ZOFRAN-ODT) disintegrating tablet 4 mg, 4 mg, Oral, Q8H PRN **OR** ondansetron (ZOFRAN) injection 4 mg, 4 mg, IntraVENous, Q6H PRN  enoxaparin (LOVENOX) injection 40 mg, 40 mg, SubCUTAneous, Daily  0.9 % sodium chloride infusion, , IntraVENous, Continuous  oxyCODONE (ROXICODONE) immediate release tablet 5 mg, 5 mg, Oral, Q4H PRN **OR** oxyCODONE (ROXICODONE) immediate release tablet 10 mg, 10 mg, Oral, Q4H PRN  morphine (PF) injection 2 mg, 2 mg, IntraVENous, Q2H PRN **OR** morphine injection 4 mg, 4 mg, IntraVENous, Q2H PRN  piperacillin-tazobactam (ZOSYN) 3,375 mg in dextrose 5 % 50 mL IVPB extended infusion (mini-bag), 3,375 mg, IntraVENous, Q8H  atorvastatin (LIPITOR) tablet 40 mg, 40 mg, Oral, Daily  insulin glargine (LANTUS) injection vial 45 Units, 45 Units, SubCUTAneous, Nightly  nystatin (MYCOSTATIN) [id number] UNIT/ML suspension 500,000 Units, 5 mL, Oral, 4x Daily  insulin lispro (HUMALOG) injection vial 0-6 Units, 0-6 Units, SubCUTAneous, TID WC  insulin lispro (HUMALOG) injection vial 0-3 Units, 0-3 Units, SubCUTAneous, Nightly  glucose (GLUTOSE) 40 % oral gel 15 g, 15 g, Oral, PRN  glucagon (rDNA) injection 1 mg, 1 mg, IntraMUSCular, PRN  dextrose 5 % solution, 100 mL/hr, IntraVENous, PRN  dextrose 10 % infusion, 125 mL, IntraVENous, PRN **OR** dextrose 10 % infusion, 250 mL, IntraVENous, PRN  hydrALAZINE (APRESOLINE) injection 5 mg, 5 mg, IntraVENous, Q4H PRN  Physical    VITALS:  BP (!) 105/54   Pulse 87   Temp 97.9 °F (36.6 °C) (Oral)   Resp 18   Ht 6' 2\" (1.88 m)   Wt (!) 316 lb 1.6 oz (143.4 kg)   SpO2 95%   BMI 40.58 kg/m²   CONSTITUTIONAL:  awake and alert  EYES:  extra-ocular muscles intact and vision intact  ENT:  normocepalic, without obvious abnormality  NECK:  supple, symmetrical, trachea midline  LUNGS:  good air exchange and no crackles or wheezing  CARDIOVASCULAR:  normal apical pulses and normal S1 and S2  ABDOMEN:  normal bowel sounds, soft and non-tender  MUSCULOSKELETAL:  full range of motion noted  NEUROLOGIC:  Mental Status Exam:  Level of Alertness:   awake  Orientation:   person, place, time  Motor Exam:  Motor exam is symmetrical 5 out of 5 all extremities bilaterally  SKIN:  no bruising or bleeding  Data    CBC:   Lab Results   Component Value Date    WBC 16.2 03/09/2022    RBC 4.51 03/09/2022    HGB 12.5 03/09/2022    HCT 38.6 03/09/2022    MCV 85.6 03/09/2022    MCH 27.7 03/09/2022    MCHC 32.4 03/09/2022    RDW 13.4 03/09/2022     03/09/2022    MPV 9.3 03/09/2022     BMP:    Lab Results   Component Value Date     03/09/2022    K 3.5 03/09/2022    CL 97 03/09/2022    CO2 23 03/09/2022    BUN 4 03/09/2022    LABALBU 2.8 03/05/2022    LABALBU 4.4 04/01/2011    CREATININE 0.7 03/09/2022    CALCIUM 7.9 03/09/2022    GFRAA >60 03/09/2022    LABGLOM >60 03/09/2022    GLUCOSE 174 03/09/2022    GLUCOSE 191 04/01/2011       ASSESSMENT AND PLAN      1. Intra-abdominal abscess ([address]  Going for IR abscess drainage today  Continue pain control  NPO per Gen surgery recommendations  Continue IV Zosyn per ID recommendations    2. DM type 2 in Obese patient:  Hold insulin until post procedure  Monitor BGL    3. Hyponatremia:  Continue IV fluid  Gradually improving    4. Hyperlipidemia: On statin    5. PINEDA: continue NIPPV QHS and as needed during daytime.

## 2022-03-09 NOTE — PROGRESS NOTES
Infectious Disease  t Note     Admit Date: 3/6/2022  1:45 AM    Chief complaint: Abdominal pain    Reason for Consult: Appendicular abscess    Requesting Physician:  Priyanka Gonzalez MD      HISTORY OF PRESENT ILLNESS:    Oscar Simpson is a 64 y.o. male who presents for evaluation of right lower quadrant pain which patient reports started a few days ago. He has some mild discomfort in the area and thought it was a gas bubble however presented to the emergency room yesterday when it did not improve. He denies any fevers or chills. He had a CT scan done in the emergency room that revealed a large right lower quadrant abscess. He was transferred to [place] for admission and further management. He denies any recent blood in the stool, no recent weight loss. He has no family history of colon cancer. He has never had a colonoscopy. Oscar Simpson is a 64 y.o. male who presents for evaluation of right lower quadrant pain which patient reports started a few days ago. He has some mild discomfort in the area and thought it was a gas bubble however presented to the emergency room yesterday when it did not improve. He denies any fevers or chills. He had a CT scan done in the emergency room that revealed a large right lower quadrant abscess. He was transferred to [place] for admission and further management. He denies any recent blood in the stool, no recent weight loss. He has no family history of colon cancer. He has never had a colonoscopy.     Infectious disease consulted mainly for further antibiotic recommendation    DOS  03/09/22   Wife present awake and alert drain has purulence about 200cc  3/8/2022  In chair for CT aspiration still with pan n o issues with meds  3/7/2022  On his side no c/o  Still with lower abd pain    REVIEW OF SYSTEMS:    No f/c/n/v/d/rash/itch     MEDICAL HISTORY  Past Medical History:   Diagnosis Date    Diabetes mellitus ([address]     [facility] of toe of left foot [facility] 11/17/2017    Hyperlipidemia     Hyperlipidemia     Sleep apnea     Vitamin D deficiency       Immunization History   Administered Date(s) Administered    COVID-19, Pfizer Purple top, DILUTE for use, 12+ yrs, 30mcg/0.3mL dose 05/27/2021, 06/17/2021    Influenza Virus Vaccine 10/03/2019    Influenza, MDCK Quadv, with preserv IM (Flucelvax 2 yrs and older) 10/04/2019    Influenza, Quadv, IM, PF (6 mo and older Fluzone, Flulaval, Fluarix, and 3 yrs and older Afluria) 11/20/2017     Past Surgical History:   Procedure Laterality Date    OTHER SURGICAL HISTORY Left 11/17/2017    Amputation of left great toe.     TOE AMPUTATION      TOE AMPUTATION Left 10/21/2019    LEFT SECOND TOE AMPUTATION performed by Ny Larry DPM at [address]        Current Medications:     Scheduled Meds:   insulin lispro  5 Units SubCUTAneous TID WC    sodium chloride flush  5-40 mL IntraVENous 2 times per day    enoxaparin  40 mg SubCUTAneous Daily    piperacillin-tazobactam  3,375 mg IntraVENous Q8H    atorvastatin  40 mg Oral Daily    insulin glargine  45 Units SubCUTAneous Nightly    nystatin  5 mL Oral 4x Daily    insulin lispro  0-6 Units SubCUTAneous TID WC    insulin lispro  0-3 Units SubCUTAneous Nightly     Continuous Infusions:   sodium chloride      sodium chloride Stopped (03/09/22 0345)    dextrose      dextrose      Or    dextrose       PRN Meds:sodium chloride flush, sodium chloride, ondansetron **OR** ondansetron, oxyCODONE **OR** oxyCODONE, morphine **OR** morphine, glucose, glucagon (rDNA), dextrose, dextrose **OR** dextrose, hydrALAZINE      PHYSICAL EXAM:  Vitals:    03/08/22 0725 03/08/22 1754 03/09/22 0600 03/09/22 1014   BP:  93/65 (!) 105/54    Pulse: 97 102 87    Resp:  18 18    Temp:  99.9 °F (37.7 °C) 97.9 °F (36.6 °C)    TempSrc:  Oral Oral    SpO2: 94% 94% 95% 94%   Weight:       Height:           General Appearance:       inc bmi        Heent:    Normocephalic, atraumatic, Lungs:     Clear to auscultation bilaterally        Heart:    Regular rate and rhythm, S1 and S2 normal   Abdomen:    Soft with tenderness at right lower quadrant bowel drain rlq pus         Extremities:   Extremities normal, atraumatic, no cyanosis or edema       Skin:   Skin color, texture, turgor normal, no rashes or lesions birthmarck right cheek   Lymph nodes:   Cervical, supraclavicular, and axillary nodes normal   Neurologic:   CNII-XII intact, no focal deficits    LINES : Peripheral            LABS AND DATA REVIEW:     Recent Labs     03/07/22 0525 03/08/22 0438 03/09/22  0410   WBC 14.4* 15.1* 16.2*   HGB 12.9 13.0 12.5   HCT 39.6 39.4 38.6   MCV 84.1 84.7 85.6    378 413     Recent Labs     03/07/22 0525 03/07/22 0525 03/08/22 0438 03/08/22 0438 03/09/22  0410   *  --  130*  --  131*   K 3.6   < > 3.6   < > 3.5   CL 95*   < > 96*   < > 97*   CO2 22   < > 23   < > 23   BUN 7  --  4*  --  4*   CREATININE 0.7  --  0.7  --  0.7   GFRAA >60  --  >60  --  >60   LABGLOM >60  --  >60  --  >60   GLUCOSE 243*  --  237*  --  174*   CALCIUM 7.9*  --  7.6*  --  7.9*    < > = values in this interval not displayed. ASSESSMENT & PLAN    60-year-old male admitted to the hospital with severe abdominal pain and on further work-up was found to have appendicular abscess 9x10 cm   Leukocytosis      S/p  CT-guided drainage of an appendicular abscess check cx     piperacillin-tazobactam (ZOSYN) 3,375 mg in dextrose 5 % 50 mL IVPB extended infusion (mini-bag), Q8H     follow labs  picc home iv     Imaging and labs were reviewed. Shahida Reyna was informed of their diagnosis, indications, risks and benefits of treatment. Shahida Reyna had the opportunity to ask questions. All questions were answered. Thank you for involving me in the care of Shahida Reyna. Please do not hesitate to call for any questions or concerns.     Electronically signed by Genna Bush MD on 3/9/2022 at 2:41 PM

## 2022-03-09 NOTE — CARE COORDINATION
CM note:  Consult noted for IV antibiotics, PICC line ordered. Met with patient and he is aware of plan, states he has done iv antibiotics at home before but could not recall the name of provider. Discussed [facility] and patient wishes for a referral with them as they take [facility]. Referral given to D[facility] Canavan. Start of care is unknown at this time. Pt also had abdominal drain placed today and will await final sensitivities. Will NEED final antibiotic signed paper scripts to run benefits.

## 2022-03-09 NOTE — PROGRESS NOTES
GENERAL SURGERY  DAILY PROGRESS NOTE  3/9/2022    CC: RLQ phlegm     Subjective:  No acute issues overnight. No nausea no vomiting. CT revealed abscess formation. Still with tenderness in that area    Objective:  BP (!) 105/54   Pulse 87   Temp 97.9 °F (36.6 °C) (Oral)   Resp 18   Ht 6' 2\" (1.88 m)   Wt (!) 316 lb 1.6 oz (143.4 kg)   SpO2 95%   BMI 40.58 kg/m²     GENERAL:  Laying in bed, awake, alert, cooperative, no apparent distress  LUNGS:  No increased work of breathing  CARDIOVASCULAR:  RR  ABDOMEN:  Soft, mild tenderness right lower quadrant, non-distended  EXTREMITIES: No edema or swelling  SKIN: Warm and dry    Assessment/Plan:  64 y.o. male with large right lower quadrant phlegmon/abscess concerning for perforated appendicitis    Examined  Continue antibiotics  N.p.o. We will plan for IR procedure today for drainage of abscess in right lower quadrant  Okay for clear liquid diet after IR procedure today    Electronically signed by Cherlynn Bloch, DO on 3/9/2022 at 7:42 AM   GENERAL SURGERY  DAILY PROGRESS NOTE  3/9/2022    CC: RLQ phlegm     Subjective:  No acute issues overnight. No nausea no vomiting. Patient still with right lower quadrant abdominal tenderness/fullness.   No gas/bowel movements    Objective:  BP (!) 105/54   Pulse 87   Temp 97.9 °F (36.6 °C) (Oral)   Resp 18   Ht 6' 2\" (1.88 m)   Wt (!) 316 lb 1.6 oz (143.4 kg)   SpO2 95%   BMI 40.58 kg/m²     GENERAL:  Laying in bed, awake, alert, cooperative, no apparent distress  LUNGS:  No increased work of breathing  CARDIOVASCULAR:  RR  ABDOMEN:  Soft, mild tenderness , non-distended  EXTREMITIES: No edema or swelling  SKIN: Warm and dry    Assessment/Plan:  64 y.o. male with large right lower quadrant phlegmon/abscess concerning for perforated appendicitis    Examined  Continue antibiotics  N.p.o.  INR for possible IR procedure today  We will repeat CT with p.o. and IV contrast today    Electronically signed by Yesenia Pardo Mushtaq Zarco MD on 3/9/2022 at 8:54 AM     General Surgery Progress Note  [facility]    Patient's Name/Date of Birth: Lambert Martinez / 1965    Date: March 9, 2022     Surgeon: Mushtaq Zarco MD    Chief Complaint: intraabdominal abscess    Patient Active Problem List   Diagnosis    Diabetic foot ulcer ([address]    Diabetes mellitus ([address]    Osteomyelitis of ankle or foot, left, acute ([address]    Cellulitis of foot    Diabetic arthropathy ([address]    Gangrene of toe of left foot ([address]    Morbid (severe) obesity due to excess calories ([address]    Pure hypertriglyceridemia    Diabetic foot infection ([address]    DVT prophylaxis    ISAIAS (acute kidney injury) ([address]    Intra-abdominal abscess ([address]       Subjective: Minimal complaints. Today. WBC up    Objective:  BP (!) 105/54   Pulse 87   Temp 97.9 °F (36.6 °C) (Oral)   Resp 18   Ht 6' 2\" (1.88 m)   Wt (!) 316 lb 1.6 oz (143.4 kg)   SpO2 95%   BMI 40.58 kg/m²   Labs:  Recent Labs     03/07/22  0525 03/08/22  0438 03/09/22  0410   WBC 14.4* 15.1* 16.2*   HGB 12.9 13.0 12.5   HCT 39.6 39.4 38.6     Lab Results   Component Value Date    CREATININE 0.7 03/09/2022    BUN 4 (L) 03/09/2022     (L) 03/09/2022    K 3.5 03/09/2022    CL 97 (L) 03/09/2022    CO2 23 03/09/2022     No results for input(s): LIPASE, AMYLASE in the last 72 hours.   CBC with Differential:    Lab Results   Component Value Date    WBC 16.2 03/09/2022    RBC 4.51 03/09/2022    HGB 12.5 03/09/2022    HCT 38.6 03/09/2022     03/09/2022    MCV 85.6 03/09/2022    MCH 27.7 03/09/2022    MCHC 32.4 03/09/2022    RDW 13.4 03/09/2022    LYMPHOPCT 9.2 03/09/2022    MONOPCT 8.9 03/09/2022    BASOPCT 0.4 03/09/2022    MONOSABS 1.44 03/09/2022    LYMPHSABS 1.49 03/09/2022    EOSABS 0.16 03/09/2022    BASOSABS 0.07 03/09/2022     CMP:    Lab Results   Component Value Date     03/09/2022    K 3.5 03/09/2022    CL 97 03/09/2022    CO2 23 03/09/2022    BUN 4 03/09/2022    CREATININE 0.7 03/09/2022    GFRAA >60 03/09/2022    LABGLOM >60 03/09/2022    GLUCOSE 174 03/09/2022    GLUCOSE 191 04/01/2011    PROT 7.8 03/05/2022    LABALBU 2.8 03/05/2022    LABALBU 4.4 04/01/2011    CALCIUM 7.9 03/09/2022    BILITOT 0.9 03/05/2022    ALKPHOS 127 03/05/2022    AST 25 03/05/2022    ALT 18 03/05/2022       General appearance:  NAD  Head: NCAT, PERRLA, EOMI, red conjunctiva  Neck: supple, no masses  Lungs: CTAB, equal chest rise bilateral  Heart: Reg rate  Abdomen: soft, nondistended, tender mild rlq, no peritonitis  Skin; no lesions  Gu: no cva tenderness  Extremities: extremities normal, atraumatic, no cyanosis or edema      Assessment/Plan:  Kaley Vicente is a 64 y.o. male large right lower quadrant phlegmon/abscess concerning for perforated appendicitis with abscess    NPO  IR drain  IV abx   Ok for diet after Ольга Muniz MD

## 2022-03-10 LAB
ANION GAP SERPL CALCULATED.3IONS-SCNC: 10 MMOL/L (ref 7–16)
BASOPHILS ABSOLUTE: 0.07 E9/L (ref 0–0.2)
BASOPHILS RELATIVE PERCENT: 0.6 % (ref 0–2)
BUN BLDV-MCNC: 4 MG/DL (ref 6–20)
CALCIUM SERPL-MCNC: 7.8 MG/DL (ref 8.6–10.2)
CHLORIDE BLD-SCNC: 100 MMOL/L (ref 98–107)
CO2: 24 MMOL/L (ref 22–29)
CREAT SERPL-MCNC: 0.7 MG/DL (ref 0.7–1.2)
EOSINOPHILS ABSOLUTE: 0.14 E9/L (ref 0.05–0.5)
EOSINOPHILS RELATIVE PERCENT: 1.2 % (ref 0–6)
GFR AFRICAN AMERICAN: >60
GFR NON-AFRICAN AMERICAN: >60 ML/MIN/1.73
GLUCOSE BLD-MCNC: 160 MG/DL (ref 74–99)
GRAM STAIN ORDERABLE: NORMAL
HCT VFR BLD CALC: 38.6 % (ref 37–54)
HEMOGLOBIN: 12.5 G/DL (ref 12.5–16.5)
IMMATURE GRANULOCYTES #: 0.21 E9/L
IMMATURE GRANULOCYTES %: 1.8 % (ref 0–5)
LYMPHOCYTES ABSOLUTE: 1.78 E9/L (ref 1.5–4)
LYMPHOCYTES RELATIVE PERCENT: 15.5 % (ref 20–42)
MAGNESIUM: 2.4 MG/DL (ref 1.6–2.6)
MCH RBC QN AUTO: 27.4 PG (ref 26–35)
MCHC RBC AUTO-ENTMCNC: 32.4 % (ref 32–34.5)
MCV RBC AUTO: 84.5 FL (ref 80–99.9)
METER GLUCOSE: 167 MG/DL (ref 74–99)
METER GLUCOSE: 187 MG/DL (ref 74–99)
METER GLUCOSE: 193 MG/DL (ref 74–99)
MONOCYTES ABSOLUTE: 1.08 E9/L (ref 0.1–0.95)
MONOCYTES RELATIVE PERCENT: 9.4 % (ref 2–12)
NEUTROPHILS ABSOLUTE: 8.17 E9/L (ref 1.8–7.3)
NEUTROPHILS RELATIVE PERCENT: 71.5 % (ref 43–80)
PDW BLD-RTO: 13.3 FL (ref 11.5–15)
PLATELET # BLD: 400 E9/L (ref 130–450)
PMV BLD AUTO: 9.2 FL (ref 7–12)
POTASSIUM REFLEX MAGNESIUM: 3.4 MMOL/L (ref 3.5–5)
RBC # BLD: 4.57 E12/L (ref 3.8–5.8)
SODIUM BLD-SCNC: 134 MMOL/L (ref 132–146)
WBC # BLD: 11.5 E9/L (ref 4.5–11.5)

## 2022-03-10 PROCEDURE — 1200000000 HC SEMI PRIVATE

## 2022-03-10 PROCEDURE — 83735 ASSAY OF MAGNESIUM: CPT

## 2022-03-10 PROCEDURE — 6360000002 HC RX W HCPCS: Performed by: SURGERY

## 2022-03-10 PROCEDURE — 82962 GLUCOSE BLOOD TEST: CPT

## 2022-03-10 PROCEDURE — 99232 SBSQ HOSP IP/OBS MODERATE 35: CPT | Performed by: INTERNAL MEDICINE

## 2022-03-10 PROCEDURE — 2580000003 HC RX 258: Performed by: SPECIALIST

## 2022-03-10 PROCEDURE — 6370000000 HC RX 637 (ALT 250 FOR IP): Performed by: INTERNAL MEDICINE

## 2022-03-10 PROCEDURE — 6370000000 HC RX 637 (ALT 250 FOR IP): Performed by: STUDENT IN AN ORGANIZED HEALTH CARE EDUCATION/TRAINING PROGRAM

## 2022-03-10 PROCEDURE — 80048 BASIC METABOLIC PNL TOTAL CA: CPT

## 2022-03-10 PROCEDURE — 2580000003 HC RX 258: Performed by: SURGERY

## 2022-03-10 PROCEDURE — 36415 COLL VENOUS BLD VENIPUNCTURE: CPT

## 2022-03-10 PROCEDURE — 99232 SBSQ HOSP IP/OBS MODERATE 35: CPT | Performed by: SURGERY

## 2022-03-10 PROCEDURE — 6360000002 HC RX W HCPCS: Performed by: SPECIALIST

## 2022-03-10 PROCEDURE — 85025 COMPLETE CBC W/AUTO DIFF WBC: CPT

## 2022-03-10 PROCEDURE — 2580000003 HC RX 258: Performed by: INTERNAL MEDICINE

## 2022-03-10 RX ORDER — SENNA AND DOCUSATE SODIUM 50; 8.6 MG/1; MG/1
2 TABLET, FILM COATED ORAL 2 TIMES DAILY
Status: DISCONTINUED | OUTPATIENT
Start: 2022-03-10 | End: 2022-03-11 | Stop reason: HOSPADM

## 2022-03-10 RX ORDER — POLYETHYLENE GLYCOL 3350 17 G/17G
17 POWDER, FOR SOLUTION ORAL DAILY
Status: DISCONTINUED | OUTPATIENT
Start: 2022-03-10 | End: 2022-03-11 | Stop reason: HOSPADM

## 2022-03-10 RX ADMIN — SODIUM CHLORIDE 25 ML: 9 INJECTION, SOLUTION INTRAVENOUS at 18:01

## 2022-03-10 RX ADMIN — INSULIN LISPRO 5 UNITS: 100 INJECTION, SOLUTION INTRAVENOUS; SUBCUTANEOUS at 11:47

## 2022-03-10 RX ADMIN — INSULIN LISPRO 1 UNITS: 100 INJECTION, SOLUTION INTRAVENOUS; SUBCUTANEOUS at 20:26

## 2022-03-10 RX ADMIN — INSULIN GLARGINE 45 UNITS: 100 INJECTION, SOLUTION SUBCUTANEOUS at 20:26

## 2022-03-10 RX ADMIN — NYSTATIN 500000 UNITS: 100000 SUSPENSION ORAL at 08:05

## 2022-03-10 RX ADMIN — INSULIN LISPRO 1 UNITS: 100 INJECTION, SOLUTION INTRAVENOUS; SUBCUTANEOUS at 11:46

## 2022-03-10 RX ADMIN — HEPARIN 300 UNITS: 100 SYRINGE at 20:30

## 2022-03-10 RX ADMIN — PIPERACILLIN SODIUM AND TAZOBACTAM SODIUM 3375 MG: 3; 375 INJECTION, POWDER, FOR SOLUTION INTRAVENOUS at 22:27

## 2022-03-10 RX ADMIN — SODIUM CHLORIDE: 9 INJECTION, SOLUTION INTRAVENOUS at 05:46

## 2022-03-10 RX ADMIN — INSULIN LISPRO 1 UNITS: 100 INJECTION, SOLUTION INTRAVENOUS; SUBCUTANEOUS at 17:30

## 2022-03-10 RX ADMIN — SODIUM CHLORIDE: 9 INJECTION, SOLUTION INTRAVENOUS at 18:04

## 2022-03-10 RX ADMIN — NYSTATIN 500000 UNITS: 100000 SUSPENSION ORAL at 20:23

## 2022-03-10 RX ADMIN — INSULIN LISPRO 1 UNITS: 100 INJECTION, SOLUTION INTRAVENOUS; SUBCUTANEOUS at 08:02

## 2022-03-10 RX ADMIN — PIPERACILLIN SODIUM AND TAZOBACTAM SODIUM 3375 MG: 3; 375 INJECTION, POWDER, FOR SOLUTION INTRAVENOUS at 05:40

## 2022-03-10 RX ADMIN — ATORVASTATIN CALCIUM 40 MG: 40 TABLET, FILM COATED ORAL at 08:10

## 2022-03-10 RX ADMIN — INSULIN LISPRO 5 UNITS: 100 INJECTION, SOLUTION INTRAVENOUS; SUBCUTANEOUS at 17:30

## 2022-03-10 RX ADMIN — SENNOSIDES AND DOCUSATE SODIUM 2 TABLET: 50; 8.6 TABLET ORAL at 08:05

## 2022-03-10 RX ADMIN — NYSTATIN 500000 UNITS: 100000 SUSPENSION ORAL at 15:05

## 2022-03-10 RX ADMIN — SENNOSIDES AND DOCUSATE SODIUM 2 TABLET: 50; 8.6 TABLET ORAL at 20:23

## 2022-03-10 RX ADMIN — INSULIN LISPRO 5 UNITS: 100 INJECTION, SOLUTION INTRAVENOUS; SUBCUTANEOUS at 08:03

## 2022-03-10 RX ADMIN — ENOXAPARIN SODIUM 40 MG: 100 INJECTION SUBCUTANEOUS at 08:04

## 2022-03-10 RX ADMIN — POLYETHYLENE GLYCOL 3350 17 G: 17 POWDER, FOR SOLUTION ORAL at 08:10

## 2022-03-10 RX ADMIN — PIPERACILLIN SODIUM AND TAZOBACTAM SODIUM 3375 MG: 3; 375 INJECTION, POWDER, FOR SOLUTION INTRAVENOUS at 15:08

## 2022-03-10 ASSESSMENT — PAIN SCALES - GENERAL
PAINLEVEL_OUTOF10: 0
PAINLEVEL_OUTOF10: 0

## 2022-03-10 NOTE — PROGRESS NOTES
GENERAL SURGERY  DAILY PROGRESS NOTE  3/10/2022    CC: RLQ phlegm     Subjective:  IR drain and PICC placement yesterday. Patient complaining of significant less abdominal pain. Patient passing gas but not having bowel movement. No nausea no vomiting. Objective:  /74   Pulse 82   Temp 98.1 °F (36.7 °C) (Oral)   Resp 18   Ht 6' 2\" (1.88 m)   Wt (!) 316 lb 1.6 oz (143.4 kg)   SpO2 98%   BMI 40.58 kg/m²     GENERAL:  Laying in bed, awake, alert, cooperative, no apparent distress  LUNGS:  No increased work of breathing  CARDIOVASCULAR:  RR  ABDOMEN:  Soft, mild tenderness right lower quadrant--resolved, non-distended, IR drain in right lower quadrant  EXTREMITIES: No edema or swelling  SKIN: Warm and dry    Assessment/Plan:  64 y.o. male with large right lower quadrant phlegmon/abscess concerning for perforated appendicitis    Patient seen and examined  Continue diet  ID for IV antibiotics  Patient doing well--dispo planning    Electronically signed by Shelly Johnson DO on 3/10/2022 at 7:26 AM     GENERAL SURGERY  DAILY PROGRESS NOTE  3/10/2022    CC: RLQ phlegm     Subjective:  No acute issues overnight. No nausea no vomiting. CT revealed abscess formation. Still with tenderness in that area    Objective:  /64   Pulse 87   Temp 98 °F (36.7 °C) (Oral)   Resp 17   Ht 6' 2\" (1.88 m)   Wt (!) 316 lb 1.6 oz (143.4 kg)   SpO2 97%   BMI 40.58 kg/m²     GENERAL:  Laying in bed, awake, alert, cooperative, no apparent distress  LUNGS:  No increased work of breathing  CARDIOVASCULAR:  RR  ABDOMEN:  Soft, mild tenderness right lower quadrant, non-distended  EXTREMITIES: No edema or swelling  SKIN: Warm and dry    Assessment/Plan:  64 y.o. male with large right lower quadrant phlegmon/abscess concerning for perforated appendicitis    Examined  Continue antibiotics  N.p.o.   We will plan for IR procedure today for drainage of abscess in right lower quadrant  Okay for clear liquid diet after IR procedure today    Electronically signed by Citlaly Baker MD on 3/10/2022 at 2:59 PM     General Surgery Progress Note  [facility]    Patient's Name/Date of Birth: Pollo Bosch / 1965    Date: March 10, 2022     Surgeon: Annalise Nuñez MD    Chief Complaint: intraabdominal abscess    Patient Active Problem List   Diagnosis    Diabetic foot ulcer ([address]    Diabetes mellitus ([address]    Osteomyelitis of ankle or foot, left, acute ([address]    Cellulitis of foot    Diabetic arthropathy ([address]    Gangrene of toe of left foot ([address]    Morbid (severe) obesity due to excess calories ([address]    Pure hypertriglyceridemia    Diabetic foot infection ([address]    DVT prophylaxis    ISAIAS (acute kidney injury) ([address]    Intra-abdominal abscess ([address]       Subjective: No complications since IR drain yesterday. White blood cell count normalized. Objective:  /64   Pulse 87   Temp 98 °F (36.7 °C) (Oral)   Resp 17   Ht 6' 2\" (1.88 m)   Wt (!) 316 lb 1.6 oz (143.4 kg)   SpO2 97%   BMI 40.58 kg/m²   Labs:  Recent Labs     03/08/22  0438 03/09/22  0410 03/10/22  0507   WBC 15.1* 16.2* 11.5   HGB 13.0 12.5 12.5   HCT 39.4 38.6 38.6     Lab Results   Component Value Date    CREATININE 0.7 03/10/2022    BUN 4 (L) 03/10/2022     03/10/2022    K 3.4 (L) 03/10/2022     03/10/2022    CO2 24 03/10/2022     No results for input(s): LIPASE, AMYLASE in the last 72 hours.   CBC with Differential:    Lab Results   Component Value Date    WBC 11.5 03/10/2022    RBC 4.57 03/10/2022    HGB 12.5 03/10/2022    HCT 38.6 03/10/2022     03/10/2022    MCV 84.5 03/10/2022    MCH 27.4 03/10/2022    MCHC 32.4 03/10/2022    RDW 13.3 03/10/2022    LYMPHOPCT 15.5 03/10/2022    MONOPCT 9.4 03/10/2022    BASOPCT 0.6 03/10/2022    MONOSABS 1.08 03/10/2022    LYMPHSABS 1.78 03/10/2022    EOSABS 0.14 03/10/2022    BASOSABS 0.07 03/10/2022     CMP:    Lab Results   Component Value Date     03/10/2022    K 3.4

## 2022-03-10 NOTE — HOME CARE
Fran Tee  Ordered therapy at time of quote: zosyn 3.375gm iv *q6h*  Coverage: self pay until 4/1 then 100% coverage  Total pt responsibility: $81.89/day      DE[facility]       SPOKE WITH PATIENT AND LEFT VM FOR SPOUSE OF IVAT COSTS HE IS NOT IN AGREEMENT ASKED PATIENT AND SPOUSE TO REACH OUT TO THEIR INSURANCE COMPANY TO VERIFY % DOESN'T START UNTIL 4/1/22

## 2022-03-10 NOTE — CARE COORDINATION
CM note: notified by [facility] care that when running benefits, insurance states patient does not have coverage until April 2022. Pt is NOT agreeable to private pay costs, will inform ID, may need a change in regimen.

## 2022-03-10 NOTE — PROGRESS NOTES
Infectious Disease  t Note     Admit Date: 3/6/2022  1:45 AM    Chief complaint: Abdominal pain    Reason for Consult: Appendicular abscess    Requesting Physician:  Asiya Preston MD      HISTORY OF PRESENT ILLNESS:    Kaley Vicente is a 64 y.o. male who presents for evaluation of right lower quadrant pain which patient reports started a few days ago. He has some mild discomfort in the area and thought it was a gas bubble however presented to the emergency room yesterday when it did not improve. He denies any fevers or chills. He had a CT scan done in the emergency room that revealed a large right lower quadrant abscess. He was transferred to [place] for admission and further management. He denies any recent blood in the stool, no recent weight loss. He has no family history of colon cancer. He has never had a colonoscopy. Kaley Vicente is a 64 y.o. male who presents for evaluation of right lower quadrant pain which patient reports started a few days ago. He has some mild discomfort in the area and thought it was a gas bubble however presented to the emergency room yesterday when it did not improve. He denies any fevers or chills. He had a CT scan done in the emergency room that revealed a large right lower quadrant abscess. He was transferred to [place] for admission and further management. He denies any recent blood in the stool, no recent weight loss. He has no family history of colon cancer. He has never had a colonoscopy.     Infectious disease consulted mainly for further antibiotic recommendation    DOS  03/10/22   In bed NAD  ASKING ABOUT D/C  DRAIN STILL IN    3/9/2022  Wife present awake and alert drain has purulence about 200cc  3/8/2022  In chair for CT aspiration still with pan n o issues with meds  3/7/2022  On his side no c/o  Still with lower abd pain    REVIEW OF SYSTEMS:    No f/c/n/v/d/rash/itch     MEDICAL HISTORY  Past Medical History: dextrose **OR** dextrose, hydrALAZINE      PHYSICAL EXAM:  Vitals:    03/09/22 1014 03/09/22 1816 03/09/22 2000 03/10/22 0530   BP:  98/66 120/73 122/74   Pulse:  99 86 82   Resp:  18 18 18   Temp:  98.7 °F (37.1 °C) 98.5 °F (36.9 °C) 98.1 °F (36.7 °C)   TempSrc:  Oral Oral Oral   SpO2: 94% 95% 98% 98%   Weight:       Height:           General Appearance:       inc bmi        Heent:    Normocephalic, atraumatic,         Lungs:     Clear to auscultation bilaterally ANT        Heart:    Regular rate and rhythm, S1 and S2 normal   Abdomen:    DISTENDED Soft with tenderness at right lower quadrant bowel drain rlq pus         Extremities:   FROM  no cyanosis or edema       Skin:    no rashes or lesions birthmarck right cheek   Lymph nodes:   Cervical, supraclavicular, and axillary nodes normal   Neurologic:   CNII-XII intact, no focal deficits    LINES : PICC            LABS AND DATA REVIEW:     Recent Labs     03/08/22 0438 03/09/22  0410 03/10/22  0507   WBC 15.1* 16.2* 11.5   HGB 13.0 12.5 12.5   HCT 39.4 38.6 38.6   MCV 84.7 85.6 84.5    413 400     Recent Labs     03/08/22 0438 03/08/22  0438 03/09/22  0410 03/09/22  0410 03/10/22  0507   *  --  131*  --  134   K 3.6   < > 3.5   < > 3.4*   CL 96*   < > 97*   < > 100   CO2 23   < > 23   < > 24   BUN 4*  --  4*  --  4*   CREATININE 0.7  --  0.7  --  0.7   GFRAA >60  --  >60  --  >60   LABGLOM >60  --  >60  --  >60   GLUCOSE 237*  --  174*  --  160*   CALCIUM 7.6*  --  7.9*  --  7.8*    < > = values in this interval not displayed.         ASSESSMENT & PLAN    63-year-old male admitted to the hospital with severe abdominal pain and on further work-up was found to have appendicular abscess 9x10 cm   Leukocytosis BETTER     S/p  CT-guided drainage of an appendicular abscess check cx GPC     piperacillin-tazobactam (ZOSYN) 3,375 mg in dextrose 5 % 50 mL IVPB extended infusion (mini-bag), Q8H MED REC      follow labs  picc home iv     Imaging and labs were reviewed. Dom Linares was informed of their diagnosis, indications, risks and benefits of treatment. Dom Linares had the opportunity to ask questions. All questions were answered. Thank you for involving me in the care of Dom Linares. Please do not hesitate to call for any questions or concerns.     Electronically signed by Katiuska Hadley MD on 3/10/2022 at 9:27 AM

## 2022-03-10 NOTE — PROGRESS NOTES
Department of Internal Medicine  General Internal Medicine  Attending Progress Note  CC: Abdominal Pain    SUBJECTIVE:    Reports that he is doing better. Noted that his stomach pain has improved. No fever or chills. No chest pain. Noted good appetite.     OBJECTIVE      Medications    Current Facility-Administered Medications: polyethylene glycol (GLYCOLAX) packet 17 g, 17 g, Oral, Daily  sennosides-docusate sodium (SENOKOT-S) 8.6-50 MG tablet 2 tablet, 2 tablet, Oral, BID  lidocaine 1 % injection 5 mL, 5 mL, IntraDERmal, Once  sodium chloride flush 0.9 % injection 5-40 mL, 5-40 mL, IntraVENous, 2 times per day  sodium chloride flush 0.9 % injection 5-40 mL, 5-40 mL, IntraVENous, PRN  0.9 % sodium chloride infusion, 25 mL, IntraVENous, PRN  heparin flush 100 UNIT/ML injection 300 Units, 3 mL, IntraVENous, 2 times per day  heparin flush 100 UNIT/ML injection 300 Units, 3 mL, IntraCATHeter, PRN  insulin lispro (HUMALOG) injection vial 5 Units, 5 Units, SubCUTAneous, TID WC  sodium chloride flush 0.9 % injection 5-40 mL, 5-40 mL, IntraVENous, 2 times per day  sodium chloride flush 0.9 % injection 5-40 mL, 5-40 mL, IntraVENous, PRN  0.9 % sodium chloride infusion, 25 mL, IntraVENous, PRN  ondansetron (ZOFRAN-ODT) disintegrating tablet 4 mg, 4 mg, Oral, Q8H PRN **OR** ondansetron (ZOFRAN) injection 4 mg, 4 mg, IntraVENous, Q6H PRN  enoxaparin (LOVENOX) injection 40 mg, 40 mg, SubCUTAneous, Daily  0.9 % sodium chloride infusion, , IntraVENous, Continuous  oxyCODONE (ROXICODONE) immediate release tablet 5 mg, 5 mg, Oral, Q4H PRN **OR** oxyCODONE (ROXICODONE) immediate release tablet 10 mg, 10 mg, Oral, Q4H PRN  morphine (PF) injection 2 mg, 2 mg, IntraVENous, Q2H PRN **OR** morphine injection 4 mg, 4 mg, IntraVENous, Q2H PRN  piperacillin-tazobactam (ZOSYN) 3,375 mg in dextrose 5 % 50 mL IVPB extended infusion (mini-bag), 3,375 mg, IntraVENous, Q8H  atorvastatin (LIPITOR) tablet 40 mg, 40 mg, Oral, Daily  insulin glargine LABALBU 4.4 04/01/2011    CREATININE 0.7 03/10/2022    CALCIUM 7.8 03/10/2022    GFRAA >60 03/10/2022    LABGLOM >60 03/10/2022    GLUCOSE 160 03/10/2022    GLUCOSE 191 04/01/2011       ASSESSMENT AND PLAN      1. Intra-abdominal abscess (HCC)  S/P IR Drainage  Continue pain medications  Gen surgery and ID following  Awaiting for culture from the drain  WBC normalizing. 2.  DM type 2 in obese patient:  Continue current insulin dosing. Diabetes diet    3. Hyponatremia: much improved    4. Hyperlipidemia: On statin    5.   PINEDA: continue CPAP at night

## 2022-03-10 NOTE — HOME CARE
CORRECTION:  PER [facility] PATIENTS MEDICAID EFFECTIVE DATE IS 4/1/2021 SO THEREFORE PATIENT IS COVERED %     Jessica Walsh LPN   [facility]

## 2022-03-10 NOTE — CARE COORDINATION
CM note: [facility] home care is following, will NEED signed paper scripts for final antibiotics and scheduled with [facility] for start of care.

## 2022-03-11 VITALS
HEART RATE: 94 BPM | WEIGHT: 315 LBS | SYSTOLIC BLOOD PRESSURE: 121 MMHG | OXYGEN SATURATION: 98 % | DIASTOLIC BLOOD PRESSURE: 70 MMHG | BODY MASS INDEX: 40.43 KG/M2 | RESPIRATION RATE: 18 BRPM | TEMPERATURE: 98 F | HEIGHT: 74 IN

## 2022-03-11 LAB
ANION GAP SERPL CALCULATED.3IONS-SCNC: 10 MMOL/L (ref 7–16)
BASOPHILS ABSOLUTE: 0.08 E9/L (ref 0–0.2)
BASOPHILS RELATIVE PERCENT: 0.9 % (ref 0–2)
BUN BLDV-MCNC: 5 MG/DL (ref 6–20)
BURR CELLS: NORMAL
CALCIUM SERPL-MCNC: 8 MG/DL (ref 8.6–10.2)
CHLORIDE BLD-SCNC: 103 MMOL/L (ref 98–107)
CO2: 23 MMOL/L (ref 22–29)
CREAT SERPL-MCNC: 0.7 MG/DL (ref 0.7–1.2)
EOSINOPHILS ABSOLUTE: 0.16 E9/L (ref 0.05–0.5)
EOSINOPHILS RELATIVE PERCENT: 1.7 % (ref 0–6)
GFR AFRICAN AMERICAN: >60
GFR NON-AFRICAN AMERICAN: >60 ML/MIN/1.73
GLUCOSE BLD-MCNC: 153 MG/DL (ref 74–99)
HCT VFR BLD CALC: 39.8 % (ref 37–54)
HEMOGLOBIN: 12.8 G/DL (ref 12.5–16.5)
LYMPHOCYTES ABSOLUTE: 2.07 E9/L (ref 1.5–4)
LYMPHOCYTES RELATIVE PERCENT: 21.7 % (ref 20–42)
MAGNESIUM: 2.3 MG/DL (ref 1.6–2.6)
MCH RBC QN AUTO: 27.4 PG (ref 26–35)
MCHC RBC AUTO-ENTMCNC: 32.2 % (ref 32–34.5)
MCV RBC AUTO: 85 FL (ref 80–99.9)
METER GLUCOSE: 142 MG/DL (ref 74–99)
METER GLUCOSE: 146 MG/DL (ref 74–99)
METER GLUCOSE: 178 MG/DL (ref 74–99)
MONOCYTES ABSOLUTE: 0.75 E9/L (ref 0.1–0.95)
MONOCYTES RELATIVE PERCENT: 7.8 % (ref 2–12)
NEUTROPHILS ABSOLUTE: 6.39 E9/L (ref 1.8–7.3)
NEUTROPHILS RELATIVE PERCENT: 67.8 % (ref 43–80)
PDW BLD-RTO: 13.4 FL (ref 11.5–15)
PLATELET # BLD: 391 E9/L (ref 130–450)
PMV BLD AUTO: 9.3 FL (ref 7–12)
POIKILOCYTES: NORMAL
POLYCHROMASIA: NORMAL
POTASSIUM REFLEX MAGNESIUM: 3.4 MMOL/L (ref 3.5–5)
RBC # BLD: 4.68 E12/L (ref 3.8–5.8)
SODIUM BLD-SCNC: 136 MMOL/L (ref 132–146)
WBC # BLD: 9.4 E9/L (ref 4.5–11.5)

## 2022-03-11 PROCEDURE — 2580000003 HC RX 258: Performed by: SURGERY

## 2022-03-11 PROCEDURE — 83735 ASSAY OF MAGNESIUM: CPT

## 2022-03-11 PROCEDURE — 6370000000 HC RX 637 (ALT 250 FOR IP): Performed by: STUDENT IN AN ORGANIZED HEALTH CARE EDUCATION/TRAINING PROGRAM

## 2022-03-11 PROCEDURE — 6370000000 HC RX 637 (ALT 250 FOR IP): Performed by: INTERNAL MEDICINE

## 2022-03-11 PROCEDURE — 85025 COMPLETE CBC W/AUTO DIFF WBC: CPT

## 2022-03-11 PROCEDURE — 36415 COLL VENOUS BLD VENIPUNCTURE: CPT

## 2022-03-11 PROCEDURE — 2580000003 HC RX 258: Performed by: SPECIALIST

## 2022-03-11 PROCEDURE — 80048 BASIC METABOLIC PNL TOTAL CA: CPT

## 2022-03-11 PROCEDURE — 82962 GLUCOSE BLOOD TEST: CPT

## 2022-03-11 PROCEDURE — 99239 HOSP IP/OBS DSCHRG MGMT >30: CPT | Performed by: INTERNAL MEDICINE

## 2022-03-11 PROCEDURE — 6360000002 HC RX W HCPCS: Performed by: SPECIALIST

## 2022-03-11 PROCEDURE — 6360000002 HC RX W HCPCS: Performed by: SURGERY

## 2022-03-11 RX ORDER — POTASSIUM CHLORIDE 750 MG/1
10 TABLET, EXTENDED RELEASE ORAL 2 TIMES DAILY
Status: DISCONTINUED | OUTPATIENT
Start: 2022-03-11 | End: 2022-03-11 | Stop reason: HOSPADM

## 2022-03-11 RX ADMIN — POLYETHYLENE GLYCOL 3350 17 G: 17 POWDER, FOR SOLUTION ORAL at 08:32

## 2022-03-11 RX ADMIN — Medication 10 ML: at 09:00

## 2022-03-11 RX ADMIN — NYSTATIN 500000 UNITS: 100000 SUSPENSION ORAL at 15:03

## 2022-03-11 RX ADMIN — ENOXAPARIN SODIUM 40 MG: 100 INJECTION SUBCUTANEOUS at 08:31

## 2022-03-11 RX ADMIN — INSULIN LISPRO 5 UNITS: 100 INJECTION, SOLUTION INTRAVENOUS; SUBCUTANEOUS at 16:43

## 2022-03-11 RX ADMIN — NYSTATIN 500000 UNITS: 100000 SUSPENSION ORAL at 16:44

## 2022-03-11 RX ADMIN — ATORVASTATIN CALCIUM 40 MG: 40 TABLET, FILM COATED ORAL at 08:31

## 2022-03-11 RX ADMIN — POTASSIUM CHLORIDE 10 MEQ: 750 TABLET, EXTENDED RELEASE ORAL at 08:50

## 2022-03-11 RX ADMIN — INSULIN LISPRO 1 UNITS: 100 INJECTION, SOLUTION INTRAVENOUS; SUBCUTANEOUS at 16:41

## 2022-03-11 RX ADMIN — PIPERACILLIN SODIUM AND TAZOBACTAM SODIUM 3375 MG: 3; 375 INJECTION, POWDER, FOR SOLUTION INTRAVENOUS at 15:06

## 2022-03-11 RX ADMIN — PIPERACILLIN SODIUM AND TAZOBACTAM SODIUM 3375 MG: 3; 375 INJECTION, POWDER, FOR SOLUTION INTRAVENOUS at 05:42

## 2022-03-11 RX ADMIN — INSULIN LISPRO 5 UNITS: 100 INJECTION, SOLUTION INTRAVENOUS; SUBCUTANEOUS at 08:32

## 2022-03-11 RX ADMIN — SENNOSIDES AND DOCUSATE SODIUM 2 TABLET: 50; 8.6 TABLET ORAL at 08:31

## 2022-03-11 RX ADMIN — INSULIN LISPRO 1 UNITS: 100 INJECTION, SOLUTION INTRAVENOUS; SUBCUTANEOUS at 08:38

## 2022-03-11 RX ADMIN — NYSTATIN 500000 UNITS: 100000 SUSPENSION ORAL at 08:31

## 2022-03-11 RX ADMIN — INSULIN LISPRO 1 UNITS: 100 INJECTION, SOLUTION INTRAVENOUS; SUBCUTANEOUS at 11:23

## 2022-03-11 RX ADMIN — INSULIN LISPRO 5 UNITS: 100 INJECTION, SOLUTION INTRAVENOUS; SUBCUTANEOUS at 11:21

## 2022-03-11 RX ADMIN — HEPARIN 300 UNITS: 100 SYRINGE at 09:00

## 2022-03-11 RX ADMIN — Medication 10 ML: at 09:13

## 2022-03-11 ASSESSMENT — PAIN SCALES - GENERAL
PAINLEVEL_OUTOF10: 0
PAINLEVEL_OUTOF10: 0

## 2022-03-11 NOTE — PROGRESS NOTES
GENERAL SURGERY  DAILY PROGRESS NOTE  3/11/2022    CC: RLQ phlegm     Subjective:  No pain, no N/V. Doing well. IR drain with purulent material present. Tolerating diet. Having BM     Objective:  /82   Pulse 82   Temp 97.5 °F (36.4 °C) (Oral)   Resp 18   Ht 6' 2\" (1.88 m)   Wt (!) 316 lb 1.6 oz (143.4 kg)   SpO2 94%   BMI 40.58 kg/m²     GENERAL:  Laying in bed, awake, alert, cooperative, no apparent distress  LUNGS:  No increased work of breathing  CARDIOVASCULAR:  RR  ABDOMEN:  Soft, mild tenderness right lower quadrant--resolved, non-distended, IR drain in right lower quadrant  EXTREMITIES: No edema or swelling  SKIN: Warm and dry    Assessment/Plan:  64 y.o. male with large right lower quadrant phlegmon/abscess concerning for perforated appendicitis    Patient seen and examined  Continue diet  ID for IV antibiotics  Patient doing well--dispo planning    Electronically signed by Jose Lopez DO on 3/11/2022 at 6:44 AM     GENERAL SURGERY  DAILY PROGRESS NOTE  3/11/2022    CC: RLQ phlegm     Subjective:  No acute issues overnight. No nausea no vomiting. CT revealed abscess formation.   Still with tenderness in that area    Objective:  /82   Pulse 82   Temp 97.5 °F (36.4 °C) (Oral)   Resp 18   Ht 6' 2\" (1.88 m)   Wt (!) 316 lb 1.6 oz (143.4 kg)   SpO2 94%   BMI 40.58 kg/m²     GENERAL:  Laying in bed, awake, alert, cooperative, no apparent distress  LUNGS:  No increased work of breathing  CARDIOVASCULAR:  RR  ABDOMEN:  Soft, mild tenderness right lower quadrant, non-distended  EXTREMITIES: No edema or swelling  SKIN: Warm and dry    Assessment/Plan:  64 y.o. male with large right lower quadrant phlegmon/abscess concerning for perforated appendicitis    Doing well, continue ABX per ID  Passing gas and having BM  Continue diet  Okay for DC once cultures are back and Abx are set up     Electronically signed by Jose Lopez DO on 3/11/2022 at 6:44 AM

## 2022-03-11 NOTE — DISCHARGE SUMMARY
Physician Discharge Summary     Patient ID:  Diogo Poon  [id number]  17 y.o.  1965    Admit date: 3/6/2022    Discharge date and time: No discharge date for patient encounter. Admitting Physician: Kim Chauhan MD     [facility]     Admission Diagnoses: Intra-abdominal abscess [facility]) [K65.1]    Discharge Diagnoses:   1. Abdominal Abscess ( ? Ruptured Appendicitis)  2. DM type 2 in obese patient  3. Hyperlipidemia  4. Hypokalemia  5. Hyponatremia  6. PINEDA    Admission Condition: fair    Discharged Condition: good    Indication for Admission:     Hospital Course:   Mr. Yue Mejias was admitted with abdominal pain. Further work up showed abdominal abscess around the appendix region. He was sent to IR and for abscess drainage and drain was left in place. Culture of the abscess was obtained. Per ID recommendation, patient was sent home on home abx. He was discharged when abx treatment were arranged. He will be discharged with drain and will follow up with gen surgery for follow up care of the IR drain    Time spent in discharge of patient is greater than 30 minutes. Consults: ID and general surgery    Significant Diagnostic Studies: labs:     Treatments: antibiotics: Zosyn    Discharge Exam:  Please see today's progress note for Physical exam    Disposition: home    In process/preliminary results:  Outstanding Order Results     Date and Time Order Name Status Description    3/9/2022  9:55 AM Culture, Body Fluid Preliminary           Patient Instructions:   Current Discharge Medication List      START taking these medications    Details   piperacillin-tazobactam (ZOSYN) infusion Infuse 2.25 g intravenously every 8 hours for 21 days Compound per protocol.   Qty: 142 g, Refills: 0         CONTINUE these medications which have NOT CHANGED    Details   sodium hypochlorite (DAKINS) 0.25 % SOLN Irrigate with 15 mLs as directed daily  Qty: 1 Bottle, Refills: 0      nystatin (MYCOSTATIN) 212786 UNIT/ML suspension Take 5 mLs by mouth 4 times daily  Qty: 60 mL, Refills: 0      atorvastatin (LIPITOR) 40 MG tablet Take 40 mg by mouth daily      acetaminophen (TYLENOL) 500 MG tablet Take 2 tablets by mouth every 8 hours as needed for Pain  Qty: 60 tablet, Refills: 0      Glucose Blood (BLOOD GLUCOSE TEST STRIPS) STRP 1 each by In Vitro route 4 times daily (with meals and nightly)  Qty: 100 strip, Refills: 0      insulin lispro (HUMALOG KWIKPEN) 100 UNIT/ML pen Inject 15 Units into the skin 3 times daily (before meals)  Qty: 5 Pen, Refills: 0      FREESTYLE LANCETS MISC 1 each by Does not apply route 4 times daily as needed (blood sugar)  Qty: 100 each, Refills: 0      Blood Glucose Monitoring Suppl (D-CARE GLUCOMETER) w/Device KIT 1 each by Does not apply route 4 times daily (with meals and nightly)  Qty: 1 kit, Refills: 0      Insulin Pen Needle 32G X 4 MM MISC 1 each by Does not apply route 4 times daily (before meals and nightly)  Qty: 100 each, Refills: 0      insulin glargine (LANTUS SOLOSTAR) 100 UNIT/ML injection pen Inject 56 Units into the skin nightly  Qty: 5 Pen, Refills: 0      loratadine (CLARITIN) 10 MG tablet Take 10 mg by mouth daily as needed            Activity: activity as tolerated  Diet: regular diet and diabetic diet  Wound Care: none needed    Follow-up with PCP in 2 weeks.     Herminio Gates Ofungwu  3/11/2022  5:00 PM

## 2022-03-11 NOTE — PROGRESS NOTES
Department of Internal Medicine  General Internal Medicine  Attending Progress Note  CC:  Abdominal Pain    SUBJECTIVE:    Reports that his stomach feels better today. No nausea or vomiting. Noted good appetite.      OBJECTIVE      Medications    Current Facility-Administered Medications: potassium chloride (KLOR-CON M) extended release tablet 10 mEq, 10 mEq, Oral, BID  polyethylene glycol (GLYCOLAX) packet 17 g, 17 g, Oral, Daily  sennosides-docusate sodium (SENOKOT-S) 8.6-50 MG tablet 2 tablet, 2 tablet, Oral, BID  lidocaine 1 % injection 5 mL, 5 mL, IntraDERmal, Once  sodium chloride flush 0.9 % injection 5-40 mL, 5-40 mL, IntraVENous, 2 times per day  sodium chloride flush 0.9 % injection 5-40 mL, 5-40 mL, IntraVENous, PRN  0.9 % sodium chloride infusion, 25 mL, IntraVENous, PRN  heparin flush 100 UNIT/ML injection 300 Units, 3 mL, IntraVENous, 2 times per day  heparin flush 100 UNIT/ML injection 300 Units, 3 mL, IntraCATHeter, PRN  insulin lispro (HUMALOG) injection vial 5 Units, 5 Units, SubCUTAneous, TID WC  sodium chloride flush 0.9 % injection 5-40 mL, 5-40 mL, IntraVENous, 2 times per day  sodium chloride flush 0.9 % injection 5-40 mL, 5-40 mL, IntraVENous, PRN  0.9 % sodium chloride infusion, 25 mL, IntraVENous, PRN  ondansetron (ZOFRAN-ODT) disintegrating tablet 4 mg, 4 mg, Oral, Q8H PRN **OR** ondansetron (ZOFRAN) injection 4 mg, 4 mg, IntraVENous, Q6H PRN  enoxaparin (LOVENOX) injection 40 mg, 40 mg, SubCUTAneous, Daily  oxyCODONE (ROXICODONE) immediate release tablet 5 mg, 5 mg, Oral, Q4H PRN **OR** oxyCODONE (ROXICODONE) immediate release tablet 10 mg, 10 mg, Oral, Q4H PRN  morphine (PF) injection 2 mg, 2 mg, IntraVENous, Q2H PRN **OR** morphine injection 4 mg, 4 mg, IntraVENous, Q2H PRN  piperacillin-tazobactam (ZOSYN) 3,375 mg in dextrose 5 % 50 mL IVPB extended infusion (mini-bag), 3,375 mg, IntraVENous, Q8H  atorvastatin (LIPITOR) tablet 40 mg, 40 mg, Oral, Daily  insulin glargine (LANTUS) injection vial 45 Units, 45 Units, SubCUTAneous, Nightly  nystatin (MYCOSTATIN) 965673 UNIT/ML suspension 500,000 Units, 5 mL, Oral, 4x Daily  insulin lispro (HUMALOG) injection vial 0-6 Units, 0-6 Units, SubCUTAneous, TID WC  insulin lispro (HUMALOG) injection vial 0-3 Units, 0-3 Units, SubCUTAneous, Nightly  glucose (GLUTOSE) 40 % oral gel 15 g, 15 g, Oral, PRN  glucagon (rDNA) injection 1 mg, 1 mg, IntraMUSCular, PRN  dextrose 5 % solution, 100 mL/hr, IntraVENous, PRN  dextrose 10 % infusion, 125 mL, IntraVENous, PRN **OR** dextrose 10 % infusion, 250 mL, IntraVENous, PRN  hydrALAZINE (APRESOLINE) injection 5 mg, 5 mg, IntraVENous, Q4H PRN  Physical    VITALS:  /82   Pulse 82   Temp 97.5 °F (36.4 °C) (Oral)   Resp 18   Ht 6' 2\" (1.88 m)   Wt (!) 316 lb 1.6 oz (143.4 kg)   SpO2 94%   BMI 40.58 kg/m²   CONSTITUTIONAL:  awake  EYES:  extra-ocular muscles intact  ENT:  normocepalic, without obvious abnormality  NECK:  supple, symmetrical, trachea midline  LUNGS:  no increased work of breathing and no crackles or wheezing  CARDIOVASCULAR:  normal apical pulses and normal S1 and S2  ABDOMEN:  normal bowel sounds, non-distended and non-tender  MUSCULOSKELETAL:  there is no redness, warmth, or swelling of the joints  NEUROLOGIC:  Mental Status Exam:  Level of Alertness:   awake  SKIN:  no bruising or bleeding  Data    CBC:   Lab Results   Component Value Date    WBC 9.4 03/11/2022    RBC 4.68 03/11/2022    HGB 12.8 03/11/2022    HCT 39.8 03/11/2022    MCV 85.0 03/11/2022    MCH 27.4 03/11/2022    MCHC 32.2 03/11/2022    RDW 13.4 03/11/2022     03/11/2022    MPV 9.3 03/11/2022     BMP:    Lab Results   Component Value Date     03/11/2022    K 3.4 03/11/2022     03/11/2022    CO2 23 03/11/2022    BUN 5 03/11/2022    LABALBU 2.8 03/05/2022    LABALBU 4.4 04/01/2011    CREATININE 0.7 03/11/2022    CALCIUM 8.0 03/11/2022    GFRAA >60 03/11/2022    LABGLOM >60 03/11/2022    GLUCOSE 153 03/11/2022    GLUCOSE 191 04/01/2011       ASSESSMENT AND PLAN      1. Intra-abdominal abscess ([address]  Much improved since IR drain placement  Still few outputs coming out  Continue Abx per ID recommendation  Awaiting abscess culture and sensitivity  Pain control if needed    2. DM type 2 in obese Patient:  Continue current Insulin dosing  Diabetic diet  Accucheck ACH S    3. Hypokalemia: replace with K-dur    4. Hyponatremia: resolved  Discontinue IV fluid    5. PINEDA: Continue CPAP    6. Hyperlipidemia: On statin    Discharge pending ID final abx recommendations.

## 2022-03-11 NOTE — CARE COORDINATION
CM note: Received script for Zosyn. Spoke with Vick Perez with [facility], they are prepared to see patient tomorrow, either for first dose if patient discharges tonight after last dose or tomorrow afternoon if patient stays overnight and received morning dose. Spoke with patient and he would like to receive tonight's dose and go home. Informed Vick Perez of this and they will see patient in the AM.  [id number] orders written and script faxed to [facility].

## 2022-03-11 NOTE — PROGRESS NOTES
Infectious Disease  t Note     Admit Date: 3/6/2022  1:45 AM    Chief complaint: Abdominal pain    Reason for Consult: Appendicular abscess    Requesting Physician:  Asiya Preston MD      HISTORY OF PRESENT ILLNESS:    Kaley Vicente is a 64 y.o. male who presents for evaluation of right lower quadrant pain which patient reports started a few days ago. He has some mild discomfort in the area and thought it was a gas bubble however presented to the emergency room yesterday when it did not improve. He denies any fevers or chills. He had a CT scan done in the emergency room that revealed a large right lower quadrant abscess. He was transferred to [place] for admission and further management. He denies any recent blood in the stool, no recent weight loss. He has no family history of colon cancer. He has never had a colonoscopy. Kaley Vicente is a 64 y.o. male who presents for evaluation of right lower quadrant pain which patient reports started a few days ago. He has some mild discomfort in the area and thought it was a gas bubble however presented to the emergency room yesterday when it did not improve. He denies any fevers or chills. He had a CT scan done in the emergency room that revealed a large right lower quadrant abscess. He was transferred to [place] for admission and further management. He denies any recent blood in the stool, no recent weight loss. He has no family history of colon cancer. He has never had a colonoscopy.     Infectious disease consulted mainly for further antibiotic recommendation    DOS  03/11/22   In bed family present drain in   picc in     3/10/2022  In bed NAD  ASKING ABOUT D/C  DRAIN STILL IN    3/9/2022  Wife present awake and alert drain has purulence about 200cc  3/8/2022  In chair for CT aspiration still with pan n o issues with meds  3/7/2022  On his side no c/o  Still with lower abd pain    REVIEW OF SYSTEMS:    No oxyCODONE, morphine **OR** morphine, glucose, glucagon (rDNA), dextrose, dextrose **OR** dextrose, hydrALAZINE      PHYSICAL EXAM:  Vitals:    03/10/22 1800 03/10/22 2206 03/11/22 0330 03/11/22 0530   BP:  120/85 115/80 124/82   Pulse:  85 74 82   Resp:  16  18   Temp:  98 °F (36.7 °C)  97.5 °F (36.4 °C)   TempSrc:  Oral  Oral   SpO2: 94%      Weight:       Height:           General Appearance:       inc bmi nad on side of bed        Heent:    Normocephalic, atraumatic         Lungs:     Wheeze  to auscultation bilaterally post         Heart:    Regular rate and rhythm, S1 and S2    Abdomen:    DISTENDED Soft with tenderness at right lower quadrant bowel drain rlq pus         Extremities:     no cyanosis or edema       Skin:    no rashes or lesions birthmark right cheek   Lymph nodes:   Cervical, supraclavicular, and axillary nodes normal   Neurologic:   CNII-XII intact, no focal deficits    LINES : PICC            LABS AND DATA REVIEW:     Recent Labs     03/09/22  0410 03/10/22  0507 03/11/22  0434   WBC 16.2* 11.5 9.4   HGB 12.5 12.5 12.8   HCT 38.6 38.6 39.8   MCV 85.6 84.5 85.0    400 391     Recent Labs     03/09/22  0410 03/09/22  0410 03/10/22  0507 03/10/22  0507 03/11/22  0434   *  --  134  --  136   K 3.5   < > 3.4*   < > 3.4*   CL 97*   < > 100   < > 103   CO2 23   < > 24   < > 23   BUN 4*  --  4*  --  5*   CREATININE 0.7  --  0.7  --  0.7   GFRAA >60  --  >60  --  >60   LABGLOM >60  --  >60  --  >60   GLUCOSE 174*  --  160*  --  153*   CALCIUM 7.9*  --  7.8*  --  8.0*    < > = values in this interval not displayed.         ASSESSMENT & PLAN    45-year-old male admitted to the hospital with severe abdominal pain and on further work-up was found to have appendicular abscess 9x10 cm   Leukocytosis BETTER     S/p  CT-guided drainage of an appendicular abscess check cx [facility]     piperacillin-tazobactam (ZOSYN) 3,375 mg in dextrose 5 % 50 mL IVPB extended infusion (mini-bag), Q8H MED REC  Cont til 10 days post drain removal      follow labs  picc home iv  Can d/c  F/u 2 weeks     Imaging and labs were reviewed. Macey German was informed of their diagnosis, indications, risks and benefits of treatment. Macey German had the opportunity to ask questions. All questions were answered. Thank you for involving me in the care of Macey German. Please do not hesitate to call for any questions or concerns.     Electronically signed by Fátima Clark MD on 3/11/2022 at 2:40 PM

## 2022-03-11 NOTE — PROGRESS NOTES
GENERAL SURGERY  DAILY PROGRESS NOTE  3/11/2022    CC: RLQ phlegm     Subjective:  No acute issues overnight. No nausea no vomiting. CT revealed abscess formation.   Still with tenderness in that area    Objective:  /82   Pulse 82   Temp 97.5 °F (36.4 °C) (Oral)   Resp 18   Ht 6' 2\" (1.88 m)   Wt (!) 316 lb 1.6 oz (143.4 kg)   SpO2 94%   BMI 40.58 kg/m²     GENERAL:  Laying in bed, awake, alert, cooperative, no apparent distress  LUNGS:  No increased work of breathing  CARDIOVASCULAR:  RR  ABDOMEN:  Soft, mild tenderness right lower quadrant--resolved, non-distended, IR drain in right lower quadrant  EXTREMITIES: No edema or swelling  SKIN: Warm and dry    Assessment/Plan:  64 y.o. male with large right lower quadrant phlegmon/abscess concerning for perforated appendicitis    Doing well, continue ABX per ID  Passing gas and having BM  Continue diet  Okay for DC once cultures are back and Abx are set up     Electronically signed by Shelly Johnson DO on 3/11/2022 at 6:46 AM     As above

## 2022-03-11 NOTE — PLAN OF CARE
Problem: Inadequate oral food/beverage intake (NI-2.1)  Goal: Food and/or Nutrient Delivery  Description: Individualized approach for food/nutrient provision.   Outcome: Ongoing
Problem: Pain:  Description: Pain management should include both nonpharmacologic and pharmacologic interventions.   Goal: Pain level will decrease  Description: Pain level will decrease  3/8/2022 0333 by Shama Alcaraz RN  Outcome: Met This Shift  Goal: Control of acute pain  Description: Control of acute pain  3/8/2022 1015 by Kimmie Seo RN  Outcome: Met This Shift  3/8/2022 0333 by Shama Alcaraz RN  Outcome: Met This Shift     Problem: Discharge Planning:  Goal: Discharged to appropriate level of care  Description: Discharged to appropriate level of care  3/8/2022 0333 by Shama Alcaraz RN  Outcome: Ongoing     Problem: Serum Glucose Level - Abnormal:  Goal: Ability to maintain appropriate glucose levels will improve  Description: Ability to maintain appropriate glucose levels will improve  3/8/2022 1015 by Kimmie Seo RN  Outcome: Met This Shift  3/8/2022 0333 by Shama Alcaraz RN  Outcome: Ongoing     Problem: Sensory Perception - Impaired:  Goal: Ability to maintain a stable neurologic state will improve  Description: Ability to maintain a stable neurologic state will improve  3/8/2022 1015 by Kimmie Seo RN  Outcome: Met This Shift  3/8/2022 0333 by Shama Alcaraz RN  Outcome: Met This Shift
Problem: Pain:  Description: Pain management should include both nonpharmacologic and pharmacologic interventions.   Goal: Pain level will decrease  Description: Pain level will decrease  Outcome: Met This Shift     Problem: Discharge Planning:  Goal: Discharged to appropriate level of care  Description: Discharged to appropriate level of care  Outcome: Met This Shift     Problem: Serum Glucose Level - Abnormal:  Goal: Ability to maintain appropriate glucose levels will improve  Description: Ability to maintain appropriate glucose levels will improve  Outcome: Met This Shift     Problem: Sensory Perception - Impaired:  Goal: Ability to maintain a stable neurologic state will improve  Description: Ability to maintain a stable neurologic state will improve  Outcome: Met This Shift
Problem: Pain:  Goal: Pain level will decrease  Description: Pain level will decrease  3/6/2022 1634 by Laurel Peña RN  Outcome: Met This Shift  3/6/2022 1633 by Laurel Peña RN  Outcome: Met This Shift  3/6/2022 0618 by Jennifer Trent RN  Outcome: Met This Shift  Goal: Control of acute pain  Description: Control of acute pain  3/6/2022 1634 by Laurel Peña RN  Outcome: Met This Shift  3/6/2022 1633 by Laurel Peña RN  Outcome: Met This Shift  3/6/2022 0618 by Jennifer Trent RN  Outcome: Met This Shift
Problem: Pain:  Goal: Pain level will decrease  Description: Pain level will decrease  3/6/2022 2343 by Alvarado Jimenez RN  Outcome: Met This Shift  3/6/2022 1634 by Kelly Martinez RN  Outcome: Met This Shift  3/6/2022 1633 by Kelly Martinez RN  Outcome: Met This Shift  Goal: Control of acute pain  Description: Control of acute pain  3/6/2022 2343 by Alvarado Jimenez RN  Outcome: Met This Shift  3/6/2022 1634 by Kelly Martinez RN  Outcome: Met This Shift  3/6/2022 1633 by Kelly Martinez RN  Outcome: Met This Shift
Problem: Pain:  Goal: Pain level will decrease  Description: Pain level will decrease  3/8/2022 0333 by Ingrid Escoto RN  Outcome: Met This Shift     Problem: Pain:  Goal: Control of acute pain  Description: Control of acute pain  Outcome: Met This Shift     Problem: Discharge Planning:  Goal: Discharged to appropriate level of care  Description: Discharged to appropriate level of care  3/8/2022 0333 by Ingrid Escoto RN  Outcome: Ongoing    Problem: Serum Glucose Level - Abnormal:  Goal: Ability to maintain appropriate glucose levels will improve  Description: Ability to maintain appropriate glucose levels will improve  3/8/2022 0333 by Ingrid Escoto RN  Outcome: Ongoing  3/7/2022 1437 by Gomez Ceron RN  Outcome: Met This Shift     Problem: Sensory Perception - Impaired:  Goal: Ability to maintain a stable neurologic state will improve  Description: Ability to maintain a stable neurologic state will improve  3/8/2022 0333 by Ingrid Escoto RN  Outcome: Met This Shift  3/7/2022 1437 by Gomez Ceron RN  Outcome: Met This Shift
Problem: Pain:  Goal: Pain level will decrease  Description: Pain level will decrease  3/9/2022 1612 by Israel Klinefelter, RN  Outcome: Met This Shift     Problem: Pain:  Goal: Control of acute pain  Description: Control of acute pain  3/9/2022 1612 by Israel Klinefelter, RN  Outcome: Met This Shift     Problem: Serum Glucose Level - Abnormal:  Goal: Ability to maintain appropriate glucose levels will improve  Description: Ability to maintain appropriate glucose levels will improve  3/9/2022 1612 by Israel Klinefelter, RN  Outcome: Met This Shift     Problem: Sensory Perception - Impaired:  Goal: Ability to maintain a stable neurologic state will improve  Description: Ability to maintain a stable neurologic state will improve  3/9/2022 1612 by Israel Klinefelter, RN  Outcome: Met This Shift     Problem: Infection - Central Venous Catheter-Associated Bloodstream Infection:  Goal: Will show no infection signs and symptoms  Description: Will show no infection signs and symptoms  Outcome: Met This Shift
Problem: Pain:  Goal: Pain level will decrease  Description: Pain level will decrease  Outcome: Met This Shift     Problem: Pain:  Goal: Control of acute pain  Description: Control of acute pain  Outcome: Met This Shift
Problem: Pain:  Goal: Pain level will decrease  Description: Pain level will decrease  Outcome: Met This Shift     Problem: Pain:  Goal: Control of acute pain  Description: Control of acute pain  Outcome: Met This Shift     Problem: Serum Glucose Level - Abnormal:  Goal: Ability to maintain appropriate glucose levels will improve  Description: Ability to maintain appropriate glucose levels will improve  Outcome: Met This Shift     Problem: Sensory Perception - Impaired:  Goal: Ability to maintain a stable neurologic state will improve  Description: Ability to maintain a stable neurologic state will improve  Outcome: Met This Shift     Problem: Infection - Central Venous Catheter-Associated Bloodstream Infection:  Goal: Will show no infection signs and symptoms  Description: Will show no infection signs and symptoms  Outcome: Met This Shift     Problem: Falls - Risk of:  Goal: Will remain free from falls  Description: Will remain free from falls  Outcome: Met This Shift     Problem: Falls - Risk of:  Goal: Absence of physical injury  Description: Absence of physical injury  Outcome: Met This Shift     Problem: SAFETY  Goal: Free from accidental physical injury  Outcome: Met This Shift     Problem: DAILY CARE  Goal: Daily care needs are met  Outcome: Met This Shift     Problem: PAIN  Goal: Patient's pain/discomfort is manageable  Outcome: Met This Shift     Problem: SKIN INTEGRITY  Goal: Skin integrity is maintained or improved  Outcome: Met This Shift     Problem: KNOWLEDGE DEFICIT  Goal: Patient/S.O. demonstrates understanding of disease process, treatment plan, medications, and discharge instructions.   Outcome: Met This Shift
Problem: Pain:  Goal: Pain level will decrease  Description: Pain level will decrease  Outcome: Met This Shift  Goal: Control of acute pain  Description: Control of acute pain  Outcome: Met This Shift     Problem: Discharge Planning:  Goal: Discharged to appropriate level of care  Description: Discharged to appropriate level of care  Outcome: Ongoing     Problem: Serum Glucose Level - Abnormal:  Goal: Ability to maintain appropriate glucose levels will improve  Description: Ability to maintain appropriate glucose levels will improve  Outcome: Ongoing     Problem: Sensory Perception - Impaired:  Goal: Ability to maintain a stable neurologic state will improve  Description: Ability to maintain a stable neurologic state will improve  Outcome: Met This Shift
Problem: Pain:  Goal: Pain level will decrease  Outcome: Met This Shift     Problem: Pain:  Goal: Control of acute pain  Outcome: Met This Shift     Problem: Discharge Planning:  Goal: Discharged to appropriate level of care  Outcome: Met This Shift     Problem: Serum Glucose Level - Abnormal:  Goal: Ability to maintain appropriate glucose levels will improve  Outcome: Met This Shift     Problem: Sensory Perception - Impaired:  Goal: Ability to maintain a stable neurologic state will improve  Outcome: Met This Shift     Problem: Infection - Central Venous Catheter-Associated Bloodstream Infection:  Goal: Will show no infection signs and symptoms  Outcome: Met This Shift     Problem: Infection - Central Venous Catheter-Associated Bloodstream Infection:  Goal: Will show no infection signs and symptoms  Outcome: Met This Shift     Problem: Falls - Risk of:  Goal: Will remain free from falls  Outcome: Met This Shift  Note: No falls     Problem: Falls - Risk of:  Goal: Will remain free from falls  Outcome: Met This Shift  Note: No falls     Problem: Falls - Risk of:  Goal: Absence of physical injury  Outcome: Met This Shift     Problem: SAFETY  Goal: Free from accidental physical injury  Outcome: Met This Shift     Problem: DAILY CARE  Goal: Daily care needs are met  Outcome: Met This Shift     Problem: DAILY CARE  Goal: Daily care needs are met  Outcome: Met This Shift     Problem: PAIN  Goal: Patient's pain/discomfort is manageable  Outcome: Met This Shift     Problem: SKIN INTEGRITY  Goal: Skin integrity is maintained or improved  Outcome: Met This Shift     Problem: KNOWLEDGE DEFICIT  Goal: Patient/S.O. demonstrates understanding of disease process, treatment plan, medications, and discharge instructions.   Outcome: Met This Shift
Completed     Problem: DAILY CARE  Goal: Daily care needs are met  3/11/2022 1827 by Elizabeth Cyr RN  Outcome: Completed     Problem: PAIN  Goal: Patient's pain/discomfort is manageable  3/11/2022 1827 by Elizabeth Cyr RN  Outcome: Completed     Problem: SKIN INTEGRITY  Goal: Skin integrity is maintained or improved  3/11/2022 1827 by Elizabeth Cyr RN  Outcome: Completed     Problem: KNOWLEDGE DEFICIT  Goal: Patient/S.O. demonstrates understanding of disease process, treatment plan, medications, and discharge instructions. 3/11/2022 1827 by Elizabeth Cyr RN  Outcome: Completed     Problem: KNOWLEDGE DEFICIT  Goal: Patient/S.O. demonstrates understanding of disease process, treatment plan, medications, and discharge instructions.   3/11/2022 1827 by Elizabeth Cyr RN  Outcome: Completed

## 2022-03-13 LAB
BODY FLUID CULTURE, STERILE: ABNORMAL
BODY FLUID CULTURE, STERILE: ABNORMAL
GRAM STAIN RESULT: ABNORMAL
ORGANISM: ABNORMAL
ORGANISM: ABNORMAL

## 2022-03-15 ENCOUNTER — OFFICE VISIT (OUTPATIENT)
Dept: PRIMARY CARE CLINIC | Age: 57
End: 2022-03-15
Payer: COMMERCIAL

## 2022-03-15 VITALS
TEMPERATURE: 96.4 F | SYSTOLIC BLOOD PRESSURE: 110 MMHG | DIASTOLIC BLOOD PRESSURE: 70 MMHG | WEIGHT: 315 LBS | BODY MASS INDEX: 40.43 KG/M2 | HEIGHT: 74 IN | HEART RATE: 110 BPM | OXYGEN SATURATION: 99 %

## 2022-03-15 DIAGNOSIS — K35.32 PERFORATED APPENDICITIS: ICD-10-CM

## 2022-03-15 DIAGNOSIS — E11.40 TYPE 2 DIABETES MELLITUS WITH DIABETIC NEUROPATHY, WITH LONG-TERM CURRENT USE OF INSULIN (HCC): Primary | ICD-10-CM

## 2022-03-15 DIAGNOSIS — Z87.39 HISTORY OF OSTEOMYELITIS: ICD-10-CM

## 2022-03-15 DIAGNOSIS — K38.8 MASS OF APPENDIX: ICD-10-CM

## 2022-03-15 DIAGNOSIS — Z79.4 TYPE 2 DIABETES MELLITUS WITH DIABETIC NEUROPATHY, WITH LONG-TERM CURRENT USE OF INSULIN (HCC): Primary | ICD-10-CM

## 2022-03-15 DIAGNOSIS — E66.01 MORBID (SEVERE) OBESITY DUE TO EXCESS CALORIES (HCC): ICD-10-CM

## 2022-03-15 DIAGNOSIS — E78.2 MIXED HYPERLIPIDEMIA: ICD-10-CM

## 2022-03-15 PROBLEM — E55.9 VITAMIN D DEFICIENCY: Status: ACTIVE | Noted: 2018-04-16

## 2022-03-15 PROCEDURE — 99215 OFFICE O/P EST HI 40 MIN: CPT | Performed by: INTERNAL MEDICINE

## 2022-03-15 PROCEDURE — 1111F DSCHRG MED/CURRENT MED MERGE: CPT | Performed by: INTERNAL MEDICINE

## 2022-03-15 RX ORDER — EMPAGLIFLOZIN 10 MG/1
1 TABLET, FILM COATED ORAL DAILY
Qty: 30 TABLET | Refills: 0 | Status: SHIPPED
Start: 2022-03-15 | End: 2022-03-31 | Stop reason: SDUPTHER

## 2022-03-15 RX ORDER — INSULIN GLARGINE 100 [IU]/ML
60 INJECTION, SOLUTION SUBCUTANEOUS NIGHTLY
Qty: 20 PEN | Refills: 0 | Status: ON HOLD
Start: 2022-03-15 | End: 2022-05-31 | Stop reason: SDUPTHER

## 2022-03-15 RX ORDER — ATORVASTATIN CALCIUM 40 MG/1
40 TABLET, FILM COATED ORAL DAILY
Qty: 90 TABLET | Refills: 0 | Status: SHIPPED
Start: 2022-03-15 | End: 2022-03-31 | Stop reason: SDUPTHER

## 2022-03-15 SDOH — ECONOMIC STABILITY: FOOD INSECURITY: WITHIN THE PAST 12 MONTHS, YOU WORRIED THAT YOUR FOOD WOULD RUN OUT BEFORE YOU GOT MONEY TO BUY MORE.: NEVER TRUE

## 2022-03-15 SDOH — ECONOMIC STABILITY: FOOD INSECURITY: WITHIN THE PAST 12 MONTHS, THE FOOD YOU BOUGHT JUST DIDN'T LAST AND YOU DIDN'T HAVE MONEY TO GET MORE.: NEVER TRUE

## 2022-03-15 ASSESSMENT — PATIENT HEALTH QUESTIONNAIRE - PHQ9
2. FEELING DOWN, DEPRESSED OR HOPELESS: 0
SUM OF ALL RESPONSES TO PHQ9 QUESTIONS 1 & 2: 0
SUM OF ALL RESPONSES TO PHQ QUESTIONS 1-9: 0
SUM OF ALL RESPONSES TO PHQ QUESTIONS 1-9: 0
1. LITTLE INTEREST OR PLEASURE IN DOING THINGS: 0
SUM OF ALL RESPONSES TO PHQ QUESTIONS 1-9: 0
SUM OF ALL RESPONSES TO PHQ QUESTIONS 1-9: 0

## 2022-03-15 ASSESSMENT — SOCIAL DETERMINANTS OF HEALTH (SDOH): HOW HARD IS IT FOR YOU TO PAY FOR THE VERY BASICS LIKE FOOD, HOUSING, MEDICAL CARE, AND HEATING?: SOMEWHAT HARD

## 2022-03-15 NOTE — PROGRESS NOTES
Post-Discharge Transitional Care Follow Up      Lambert Martinez   YOB: 1965    Date of Office Visit:  3/15/2022  Date of Hospital Admission: 3/6/22  Date of Hospital Discharge: 3/11/22  Readmission Risk Score (high >=14%. Medium >=10%):Readmission Risk Score: 8.9 ( )      Care management risk score Rising risk (score 2-5) and Complex Care (Scores >=6): 1     Non face to face  following discharge, date last encounter closed (first attempt may have been earlier): *No documented post hospital discharge outreach found in the last 14 days     Call initiated 2 business days of discharge: *No response recorded in the last 14 days     Type 2 diabetes mellitus with diabetic neuropathy, with long-term current use of insulin (HCC)  -     insulin glargine (LANTUS SOLOSTAR) 100 UNIT/ML injection pen; Inject 60 Units into the skin nightly, Disp-20 pen, R-0Normal  -     Comprehensive Metabolic Panel; Future  -     MICROALBUMIN / CREATININE URINE RATIO; Future  -     R Josse Perez, PodiatrMarcello darbyTracy  -     empagliflozin (JARDIANCE) 10 MG tablet; Take 1 tablet by mouth daily, Disp-30 tablet, R-0Normal  Morbid (severe) obesity due to excess calories ([facility])  Mixed hyperlipidemia  -     atorvastatin (LIPITOR) 40 MG tablet; Take 1 tablet by mouth daily, Disp-90 tablet, R-0Normal  -     LIPID PANEL; Future  History of osteomyelitis  -     R Josse Perez Podirene Forbes  Perforated appendicitis  Mass of appendix      Medical Decision Making: high complexity  Return in about 2 weeks (around 3/29/2022). On this date 3/15/2022 I have spent 50 minutes reviewing previous notes, test results and face to face with the patient discussing the diagnosis and importance of compliance with the treatment plan as well as documenting on the day of the visit. Subjective:   HPI  63 YO white male type 2 DM X 20 YEARS NOT well controlled.  Last HGB A1C 11.5  Had left 2 toes amputated because of osteo myelitis. REFUSED FOOT EXAM TODAY ? Pt has a hx of hyperlipidemia. On lipitor. Also gave me a hx of Diabetic neuropathy in the feet. Pt. Was discharged from [address] 4 DAYS AGO. Was treated for perforated appendix. Currently on IV ZOSYN. HAS ABD. DRAIN IN RLQ. .    Inpatient course: Discharge summary reviewed- see chart.     Interval history/Current status: 4 days    Patient Active Problem List   Diagnosis    [facility]abetic foot ulcer [facility]    Diabetes mellitus ([facility]    Osteomyelitis of ankle or foot, left, acute ([facility]    Cellulitis of foot    Diabetic arthropathy (HCC)    Gangrene of toe of left foot (HCC)    [facility]severe[facility] to [facility] calories [facility]    Pure hypertriglyceridemia    Diabetic foot infection [facility]    DVT prophylaxis    ISAIAS (acute kidney injury) ([facility]    Intra-abdominal abscess ([facility]    Vitamin D deficiency       Medications listed as ordered at the time of discharge from hospital  [unfilled]     Medications marked \"taking\" at this time  Outpatient Medications Marked as Taking for the 3/15/22 encounter (Office Visit) with Aimee Oglesby MD   Medication Sig Dispense Refill    insulin glargine (LANTUS SOLOSTAR) 100 UNIT/ML injection pen Inject 60 Units into the skin nightly 20 pen 0    atorvastatin (LIPITOR) 40 MG tablet Take 1 tablet by mouth daily 90 tablet 0    empagliflozin (JARDIANCE) 10 MG tablet Take 1 tablet by mouth daily 30 tablet 0    sodium hypochlorite (DAKINS) 0.25 % SOLN Irrigate with 15 mLs as directed daily 1 Bottle 0    acetaminophen (TYLENOL) 500 MG tablet Take 2 tablets by mouth every 8 hours as needed for Pain 60 tablet 0    Glucose Blood (BLOOD GLUCOSE TEST STRIPS) STRP 1 each by In Vitro route 4 times daily (with meals and nightly) 100 strip 0    insulin lispro (HUMALOG KWIKPEN) 100 UNIT/ML pen Inject 15 Units into the skin 3 times daily (before meals) 5 Pen 0    FREESTYLE LANCETS MISC 1 each by Does not apply route 4 times daily as needed (blood sugar) 100 each 0    Blood Glucose Monitoring Suppl (D-CARE GLUCOMETER) w/Device KIT 1 each by Does not apply route 4 times daily (with meals and nightly) 1 kit 0    Insulin Pen Needle 32G X 4 MM MISC 1 each by Does not apply route 4 times daily (before meals and nightly) 100 each 0    loratadine (CLARITIN) 10 MG tablet Take 10 mg by mouth daily as needed           Medications patient taking as of now reconciled against medications ordered at time of hospital discharge: Yes    Review of Systems    Objective:    /70 (Site: Left Upper Arm, Position: Sitting, Cuff Size: Large Adult)   Pulse 110   Temp 96.4 °F (35.8 °C)   Ht 6' 2\" (1.88 m)   Wt (!) 319 lb 4.8 oz (144.8 kg)   SpO2 99%   BMI 41.00 kg/m²   Physical Exam    An electronic signature was used to authenticate this note.   --Rica Rocha MD

## 2022-03-15 NOTE — PATIENT INSTRUCTIONS

## 2022-03-22 ENCOUNTER — HOSPITAL ENCOUNTER (OUTPATIENT)
Dept: CT IMAGING | Age: 57
Discharge: HOME | End: 2022-03-22
Payer: COMMERCIAL

## 2022-03-22 ENCOUNTER — TELEPHONE (OUTPATIENT)
Dept: SURGERY | Age: 57
End: 2022-03-22

## 2022-03-22 ENCOUNTER — OFFICE VISIT (OUTPATIENT)
Dept: SURGERY | Age: 57
End: 2022-03-22
Payer: COMMERCIAL

## 2022-03-22 VITALS
SYSTOLIC BLOOD PRESSURE: 128 MMHG | WEIGHT: 315 LBS | HEIGHT: 74 IN | BODY MASS INDEX: 40.43 KG/M2 | TEMPERATURE: 96.9 F | HEART RATE: 96 BPM | DIASTOLIC BLOOD PRESSURE: 81 MMHG

## 2022-03-22 DIAGNOSIS — K65.1 RIGHT LOWER QUADRANT ABDOMINAL ABSCESS (HCC): Primary | ICD-10-CM

## 2022-03-22 DIAGNOSIS — K65.1 RIGHT LOWER QUADRANT ABDOMINAL ABSCESS (HCC): ICD-10-CM

## 2022-03-22 PROCEDURE — 3017F COLORECTAL CA SCREEN DOC REV: CPT | Performed by: SURGERY

## 2022-03-22 PROCEDURE — 1111F DSCHRG MED/CURRENT MED MERGE: CPT | Performed by: SURGERY

## 2022-03-22 PROCEDURE — 6360000004 HC RX CONTRAST MEDICATION: Performed by: RADIOLOGY

## 2022-03-22 PROCEDURE — 1036F TOBACCO NON-USER: CPT | Performed by: SURGERY

## 2022-03-22 PROCEDURE — G8427 DOCREV CUR MEDS BY ELIG CLIN: HCPCS | Performed by: SURGERY

## 2022-03-22 PROCEDURE — G8417 CALC BMI ABV UP PARAM F/U: HCPCS | Performed by: SURGERY

## 2022-03-22 PROCEDURE — 99213 OFFICE O/P EST LOW 20 MIN: CPT | Performed by: SURGERY

## 2022-03-22 PROCEDURE — G8484 FLU IMMUNIZE NO ADMIN: HCPCS | Performed by: SURGERY

## 2022-03-22 PROCEDURE — 74177 CT ABD & PELVIS W/CONTRAST: CPT

## 2022-03-22 RX ADMIN — IOPAMIDOL 75 ML: 755 INJECTION, SOLUTION INTRAVENOUS at 13:22

## 2022-03-22 RX ADMIN — IOHEXOL 50 ML: 240 INJECTION, SOLUTION INTRATHECAL; INTRAVASCULAR; INTRAVENOUS; ORAL at 13:21

## 2022-03-22 NOTE — TELEPHONE ENCOUNTER
Call placed to inform patient that Dr. Roberta Lynn has reviewed his CT Scan results and to schedule him in the office tomorrow to have his drain removed. Patient not available, message left on machine with appointment information.   Electronically signed by Elaina Funez on 3/22/22 at 3:13 PM EDT

## 2022-03-22 NOTE — PROGRESS NOTES
epistaxis  Allergy and Immunology ROS: negative itchy/watery eyes or nasal congestion  Hematological and Lymphatic ROS: negative spontaneous bleeding or bruising  Endocrine ROS: negative  lethargy, mood swings, palpitations or polydipsia/polyuria  Respiratory ROS: negative sputum changes, stridor, tachypnea or wheezing  Cardiovascular ROS: negative for - loss of consciousness, murmur or orthopnea  Gastrointestinal ROS: negative for - hematochezia or hematemesis  Genito-Urinary ROS: negative for -  genital discharge or hematuria  Musculoskeletal ROS: negative for - focal weakness, gangrene  Psych/Neuro ROS: negative for - visual or auditory hallucinations, suicidal ideation      Time spent reviewing past medical, surgical, social and family history, vitals, nursing assessment and images.     Imaging:  N/A    Pathology: N/A    Assessment/Plan:  Jessica Crow is a 64 y.o. male with right lower quadrant abscess status post IR drain, persistent right lower quadrant pain with purulent output, remains on IV antibiotics    Stat CT abdomen pelvis with p.o. and IV contrast to evaluate for ongoing infection  Antibiotics per ID  Further management pending CT abdomen and pelvis    Physician Signature: Electronically signed by Dr. Darinel Biggs  3/22/2022

## 2022-03-23 ENCOUNTER — OFFICE VISIT (OUTPATIENT)
Dept: SURGERY | Age: 57
End: 2022-03-23
Payer: COMMERCIAL

## 2022-03-23 VITALS
RESPIRATION RATE: 20 BRPM | BODY MASS INDEX: 40.43 KG/M2 | SYSTOLIC BLOOD PRESSURE: 128 MMHG | OXYGEN SATURATION: 98 % | DIASTOLIC BLOOD PRESSURE: 84 MMHG | TEMPERATURE: 97.2 F | WEIGHT: 315 LBS | HEART RATE: 88 BPM | HEIGHT: 74 IN

## 2022-03-23 DIAGNOSIS — K65.1 RIGHT LOWER QUADRANT ABSCESS (HCC): Primary | ICD-10-CM

## 2022-03-23 PROCEDURE — G8484 FLU IMMUNIZE NO ADMIN: HCPCS | Performed by: SURGERY

## 2022-03-23 PROCEDURE — G8417 CALC BMI ABV UP PARAM F/U: HCPCS | Performed by: SURGERY

## 2022-03-23 PROCEDURE — 3017F COLORECTAL CA SCREEN DOC REV: CPT | Performed by: SURGERY

## 2022-03-23 PROCEDURE — 1111F DSCHRG MED/CURRENT MED MERGE: CPT | Performed by: SURGERY

## 2022-03-23 PROCEDURE — 99212 OFFICE O/P EST SF 10 MIN: CPT | Performed by: SURGERY

## 2022-03-23 PROCEDURE — 1036F TOBACCO NON-USER: CPT | Performed by: SURGERY

## 2022-03-23 PROCEDURE — G8427 DOCREV CUR MEDS BY ELIG CLIN: HCPCS | Performed by: SURGERY

## 2022-03-24 NOTE — PROGRESS NOTES
General Surgery Office Note  [facility] Surgery  Consandre P. Jason Jang MD    Patient's Name/Date of Birth: Shahida Reyna / 1965    Date: March 24, 2022     Surgeon: Jason Jang MD    Chief Complaint:   Chief Complaint   Patient presents with    Procedure     drain removal    Results     ct abdomen and pelvis       Patient Active Problem List   Diagnosis    Diabetic foot ulcer ([address]    Diabetes mellitus ([address]    Osteomyelitis of ankle or foot, left, acute ([address]    Cellulitis of foot    Diabetic arthropathy ([address]    Gangrene of toe of left foot ([address]    Morbid (severe) obesity due to excess calories ([address]    Pure hypertriglyceridemia    Diabetic foot infection ([address]    DVT prophylaxis    ISAIAS (acute kidney injury) ([address]    Intra-abdominal abscess ([address]    Vitamin D deficiency       Subjective: CT abdomen pelvis reviewed. Resolution of endomyocardial abscess    Objective:  /84   Pulse 88   Temp 97.2 °F (36.2 °C)   Resp 20   Ht 6' 2\" (1.88 m)   Wt (!) 315 lb (142.9 kg)   SpO2 98%   BMI 40.44 kg/m²   Labs:  No results for input(s): WBC, HGB, HCT in the last 72 hours. Invalid input(s): PLR  Lab Results   Component Value Date    CREATININE 0.8 03/17/2022    BUN 10 03/17/2022     03/17/2022    K 4.0 03/17/2022    CL 98 03/17/2022    CO2 25 03/17/2022     No results for input(s): LIPASE, AMYLASE in the last 72 hours. General appearance: AA, NAD  HEENT: NCAT, PERRLA, EOMI  Lungs: Clear, equal rise bilateral  Heart: Reg  Abdomen: soft, nondistended, mild right lower quadrant tenderness. No guarding or rebound. Right lower quadrant drain removed  Skin: No lesions  Psych: No distress, conversive, no hallucinations  : No ulcers or lesions  Rectal: No bleeding    A complete 10 system review was performed and are otherwise negative unless mentioned in the above HPI. Specific negatives are listed below but may not include all those reviewed.     General ROS: negative obtundation, AMS  ENT ROS: negative rhinorrhea, epistaxis  Allergy and Immunology ROS: negative itchy/watery eyes or nasal congestion  Hematological and Lymphatic ROS: negative spontaneous bleeding or bruising  Endocrine ROS: negative  lethargy, mood swings, palpitations or polydipsia/polyuria  Respiratory ROS: negative sputum changes, stridor, tachypnea or wheezing  Cardiovascular ROS: negative for - loss of consciousness, murmur or orthopnea  Gastrointestinal ROS: negative for - hematochezia or hematemesis  Genito-Urinary ROS: negative for -  genital discharge or hematuria  Musculoskeletal ROS: negative for - focal weakness, gangrene  Psych/Neuro ROS: negative for - visual or auditory hallucinations, suicidal ideation      Time spent reviewing past medical, surgical, social and family history, vitals, nursing assessment and images.     Imaging:  N/A    Pathology: N/A    Assessment/Plan:  Sanjana Bush is a 64 y.o. male with right lower quadrant abscess status post IR drain, persistent right lower quadrant pain with purulent output, remains on IV antibiotics    Right lower quadrant drain was removed  Patient to follow-up with ID for completion of antibiotic treatment  He needs a colonoscopy and this will be scheduled in 4 to 6 weeks once acute disease is completely treated  We discussed interval appendectomy which may not be necessary pending patient's progress    Physician Signature: Electronically signed by Dr. Mor Vaughn  3/24/2022

## 2022-03-30 LAB
ALBUMIN SERPL-MCNC: NORMAL G/DL
ALP BLD-CCNC: NORMAL U/L
ALT SERPL-CCNC: NORMAL U/L
ANION GAP SERPL CALCULATED.3IONS-SCNC: NORMAL MMOL/L
AST SERPL-CCNC: NORMAL U/L
BILIRUB SERPL-MCNC: NORMAL MG/DL
BUN BLDV-MCNC: NORMAL MG/DL
CALCIUM SERPL-MCNC: NORMAL MG/DL
CHLORIDE BLD-SCNC: NORMAL MMOL/L
CHOLESTEROL, TOTAL: NORMAL
CHOLESTEROL/HDL RATIO: NORMAL
CO2: NORMAL
CREAT SERPL-MCNC: NORMAL MG/DL
CREATININE, URINE: 87.53
GFR CALCULATED: NORMAL
GLUCOSE BLD-MCNC: NORMAL MG/DL
HDLC SERPL-MCNC: NORMAL MG/DL
LDL CHOLESTEROL CALCULATED: NORMAL
MICROALBUMIN/CREAT 24H UR: 8.9 MG/G{CREAT}
MICROALBUMIN/CREAT UR-RTO: 101.7
NONHDLC SERPL-MCNC: NORMAL MG/DL
POTASSIUM SERPL-SCNC: NORMAL MMOL/L
SODIUM BLD-SCNC: NORMAL MMOL/L
TOTAL PROTEIN: NORMAL
TRIGL SERPL-MCNC: NORMAL MG/DL
VLDLC SERPL CALC-MCNC: NORMAL MG/DL

## 2022-03-31 ENCOUNTER — OFFICE VISIT (OUTPATIENT)
Dept: PRIMARY CARE CLINIC | Age: 57
End: 2022-03-31
Payer: COMMERCIAL

## 2022-03-31 VITALS
BODY MASS INDEX: 39.78 KG/M2 | WEIGHT: 310 LBS | SYSTOLIC BLOOD PRESSURE: 110 MMHG | HEART RATE: 102 BPM | HEIGHT: 74 IN | OXYGEN SATURATION: 97 % | DIASTOLIC BLOOD PRESSURE: 76 MMHG | TEMPERATURE: 96 F

## 2022-03-31 DIAGNOSIS — E78.2 MIXED HYPERLIPIDEMIA: ICD-10-CM

## 2022-03-31 DIAGNOSIS — Z79.4 TYPE 2 DIABETES MELLITUS WITH DIABETIC NEUROPATHY, WITH LONG-TERM CURRENT USE OF INSULIN (HCC): Primary | ICD-10-CM

## 2022-03-31 DIAGNOSIS — R80.9 PROTEINURIA DUE TO TYPE 2 DIABETES MELLITUS (HCC): ICD-10-CM

## 2022-03-31 DIAGNOSIS — E11.40 CONTROLLED TYPE 2 DIABETES MELLITUS WITH DIABETIC NEUROPATHY, WITH LONG-TERM CURRENT USE OF INSULIN (HCC): Primary | ICD-10-CM

## 2022-03-31 DIAGNOSIS — E11.29 PROTEINURIA DUE TO TYPE 2 DIABETES MELLITUS (HCC): ICD-10-CM

## 2022-03-31 DIAGNOSIS — Z79.4 CONTROLLED TYPE 2 DIABETES MELLITUS WITH DIABETIC NEUROPATHY, WITH LONG-TERM CURRENT USE OF INSULIN (HCC): Primary | ICD-10-CM

## 2022-03-31 DIAGNOSIS — Z00.00 HEALTH CARE MAINTENANCE: ICD-10-CM

## 2022-03-31 DIAGNOSIS — G47.33 OSA (OBSTRUCTIVE SLEEP APNEA): ICD-10-CM

## 2022-03-31 DIAGNOSIS — E66.01 MORBID (SEVERE) OBESITY DUE TO EXCESS CALORIES (HCC): ICD-10-CM

## 2022-03-31 DIAGNOSIS — E11.40 TYPE 2 DIABETES MELLITUS WITH DIABETIC NEUROPATHY, WITH LONG-TERM CURRENT USE OF INSULIN (HCC): Primary | ICD-10-CM

## 2022-03-31 PROCEDURE — 3046F HEMOGLOBIN A1C LEVEL >9.0%: CPT | Performed by: INTERNAL MEDICINE

## 2022-03-31 PROCEDURE — G8427 DOCREV CUR MEDS BY ELIG CLIN: HCPCS | Performed by: INTERNAL MEDICINE

## 2022-03-31 PROCEDURE — 99214 OFFICE O/P EST MOD 30 MIN: CPT | Performed by: INTERNAL MEDICINE

## 2022-03-31 PROCEDURE — G8417 CALC BMI ABV UP PARAM F/U: HCPCS | Performed by: INTERNAL MEDICINE

## 2022-03-31 PROCEDURE — 1111F DSCHRG MED/CURRENT MED MERGE: CPT | Performed by: INTERNAL MEDICINE

## 2022-03-31 PROCEDURE — 3017F COLORECTAL CA SCREEN DOC REV: CPT | Performed by: INTERNAL MEDICINE

## 2022-03-31 PROCEDURE — 2022F DILAT RTA XM EVC RTNOPTHY: CPT | Performed by: INTERNAL MEDICINE

## 2022-03-31 PROCEDURE — 1036F TOBACCO NON-USER: CPT | Performed by: INTERNAL MEDICINE

## 2022-03-31 PROCEDURE — G8484 FLU IMMUNIZE NO ADMIN: HCPCS | Performed by: INTERNAL MEDICINE

## 2022-03-31 RX ORDER — LANCETS 28 GAUGE
1 EACH MISCELLANEOUS 4 TIMES DAILY PRN
Qty: 200 EACH | Refills: 2 | Status: SHIPPED
Start: 2022-03-31 | End: 2022-09-20

## 2022-03-31 RX ORDER — BLOOD SUGAR DIAGNOSTIC
1 STRIP MISCELLANEOUS 4 TIMES DAILY PRN
Qty: 360 EACH | Refills: 0 | Status: CANCELLED | OUTPATIENT
Start: 2022-03-31

## 2022-03-31 RX ORDER — BLOOD-GLUCOSE METER
1 EACH MISCELLANEOUS 4 TIMES DAILY PRN
Qty: 1 KIT | Refills: 0 | Status: SHIPPED | OUTPATIENT
Start: 2022-03-31

## 2022-03-31 RX ORDER — BLOOD-GLUCOSE METER
1 KIT MISCELLANEOUS 4 TIMES DAILY PRN
Qty: 1 KIT | Refills: 0 | Status: SHIPPED
Start: 2022-03-31 | End: 2022-09-20

## 2022-03-31 RX ORDER — BLOOD SUGAR DIAGNOSTIC
1 STRIP MISCELLANEOUS 4 TIMES DAILY PRN
Qty: 360 EACH | Refills: 0 | Status: SHIPPED | OUTPATIENT
Start: 2022-03-31

## 2022-03-31 RX ORDER — ATORVASTATIN CALCIUM 40 MG/1
40 TABLET, FILM COATED ORAL DAILY
Qty: 90 TABLET | Refills: 0 | Status: SHIPPED
Start: 2022-03-31 | End: 2022-09-20 | Stop reason: SDUPTHER

## 2022-03-31 RX ORDER — BLOOD-GLUCOSE METER
1 EACH MISCELLANEOUS 4 TIMES DAILY PRN
Qty: 1 KIT | Refills: 0 | Status: CANCELLED | OUTPATIENT
Start: 2022-03-31

## 2022-03-31 RX ORDER — LANCETS
1 EACH MISCELLANEOUS 4 TIMES DAILY PRN
Qty: 360 EACH | Refills: 0 | Status: CANCELLED | OUTPATIENT
Start: 2022-03-31

## 2022-03-31 RX ORDER — LISINOPRIL 5 MG/1
5 TABLET ORAL DAILY
Qty: 30 TABLET | Refills: 0 | Status: SHIPPED
Start: 2022-03-31 | End: 2022-04-18 | Stop reason: SDUPTHER

## 2022-03-31 RX ORDER — LANCETS
1 EACH MISCELLANEOUS 4 TIMES DAILY PRN
Qty: 360 EACH | Refills: 0 | Status: SHIPPED | OUTPATIENT
Start: 2022-03-31

## 2022-03-31 RX ORDER — EMPAGLIFLOZIN 10 MG/1
1 TABLET, FILM COATED ORAL DAILY
Qty: 90 TABLET | Refills: 0 | Status: SHIPPED
Start: 2022-03-31 | End: 2022-09-20 | Stop reason: SDUPTHER

## 2022-03-31 RX ORDER — BLOOD-GLUCOSE METER
1 KIT MISCELLANEOUS DAILY
Qty: 200 EACH | Refills: 2 | Status: SHIPPED
Start: 2022-03-31 | End: 2022-09-20

## 2022-03-31 NOTE — PATIENT INSTRUCTIONS
Patient was advised to start lisinopril 5 mg tablet daily  To take Lipitor 40 mg daily as prescribed  Continue Jardiance 10 mg tablet daily  Get blood work and urine test done before next visit in 2-month  Get a new glucometer because the existing glucometer readings do not correspond with  lab drawn blood sugars  Consult podiatry

## 2022-03-31 NOTE — PROGRESS NOTES
Chief Complaint   Patient presents with    Follow-up     from March 15. with labs. Had lipid drawn at [facility] 2days ago. calling for results.  Other     would like to discuss sleep study for getting upgrade on cpap.  Diabetes     self glucose 131    Other     PICC line in right upper arm is scheduled to be removed today. HPI:  Patient is here for follow-up. Patient stated that he started taking Beverely [place] he feels very well on it lost 9 pounds he stopped taking Humalog insulin and continued Lantus. Patient is not compliant on atorvastatin. He noticed his fasting blood sugar at home has come down significantly in the 130s however his glucometer is old. He feels that he has more energy  Lab work showed LDL cholesterol 163  Fasting blood sugar by lab 237    Urine albumin creatinine ratio reviewed    Right lower quadrant abdominal drain removed. Patient still on IV antibiotics sed rate 58      Past Medical History, Surgical History, and Family History has been reviewed and updated.     Review of Systems:  Constitutional:  No fever, no fatigue, no chills, no headaches, no weight change  Dermatology:  No rash, no mole, no dry or sensitive skin  ENT:  No cough, no sore throat, no sinus pain, no runny nose, no ear pain  Cardiology:  No chest pain, no palpitations, no leg edema, no shortness of breath, no PND  Gastroenterology:  No dysphagia, no abdominal pain, no nausea, no vomiting, no constipation, no diarrhea, no heartburn  Musculoskeletal:  No joint pain, no leg cramps, no back pain, no muscle aches  Respiratory:  No shortness of breath, no orthopnea, no wheezing, no GODINEZ, no hemoptysis  Urology:  No blood in the urine, no urinary frequency, no urinary incontinence, no urinary urgency, no nocturia, no dysuria    Vitals:    03/31/22 0902   BP: 110/76   Site: Right Upper Arm   Position: Sitting   Cuff Size: Large Adult   Pulse: 102   Temp: 96 °F (35.6 °C)   SpO2: 97%   Weight: (!) 310 lb (140.6 kg)   Height: 6' 2\" (1.88 m)       General:  Patient alert and oriented x 3, NAD, pleasant  HEENT:  Atraumatic, normocephalic, PERRLA, EOMI, clear conjunctiva, TMs clear, nose-clear, throat - no erythema  Neck:  Supple, no goiter, no carotid bruits, no LAD  Lungs:  CTA   Heart:  RRR, no murmurs, gallops or rubs  Abdomen:  Soft/nt/nd, + bowel sounds  Extremities:  No clubbing, cyanosis or edema  First and second toe amputated in the left foot. Pedal pulses feeble bilaterally.   Skin: unremarkable    Hemoglobin A1C   Date Value Ref Range Status   03/06/2022 11.5 (H) 4.0 - 5.6 % Final     Cholesterol, Total   Date Value Ref Range Status   08/10/2015 167 0 - 199 mg/dL Final     Triglycerides   Date Value Ref Range Status   08/10/2015 153 (H) 0 - 149 mg/dL Final     HDL   Date Value Ref Range Status   08/10/2015 29 >40 mg/dL Final     LDL Calculated   Date Value Ref Range Status   08/10/2015 107 (H) 0 - 99 mg/dL Final     VLDL Cholesterol Calculated   Date Value Ref Range Status   08/10/2015 31 mg/dL Final     Sodium   Date Value Ref Range Status   03/28/2022 137 132 - 146 mmol/L Final     Potassium   Date Value Ref Range Status   03/28/2022 4.3 3.5 - 5.0 mmol/L Final     Chloride   Date Value Ref Range Status   03/28/2022 101 98 - 107 mmol/L Final     CO2   Date Value Ref Range Status   03/28/2022 22 22 - 29 mmol/L Final     BUN   Date Value Ref Range Status   03/28/2022 11 6 - 20 mg/dL Final     CREATININE   Date Value Ref Range Status   03/28/2022 0.8 0.7 - 1.2 mg/dL Final     Glucose   Date Value Ref Range Status   03/28/2022 237 (H) 74 - 99 mg/dL Final     Calcium   Date Value Ref Range Status   03/28/2022 9.4 8.6 - 10.2 mg/dL Final     Total Protein   Date Value Ref Range Status   03/28/2022 7.6 6.4 - 8.3 g/dL Final     Albumin   Date Value Ref Range Status   03/28/2022 3.9 3.5 - 5.2 g/dL Final     Total Bilirubin   Date Value Ref Range Status   03/28/2022 0.3 0.0 - 1.2 mg/dL Final     Alkaline Phosphatase   Date Value Ref Range Status   03/28/2022 155 (H) 40 - 129 U/L Final     AST   Date Value Ref Range Status   03/28/2022 57 (H) 0 - 39 U/L Final     ALT   Date Value Ref Range Status   03/28/2022 68 (H) 0 - 40 U/L Final     GFR Non-   Date Value Ref Range Status   03/28/2022 >60 >=60 mL/min/1.73 Final     Comment:     Chronic Kidney Disease: less than 60 ml/min/1.73 sq.m. Kidney Failure: less than 15 ml/min/1.73 sq.m. Results valid for patients 18 years and older. GFR    Date Value Ref Range Status   03/28/2022 >60  Final        CT ABDOMEN PELVIS W IV CONTRAST Additional Contrast? Oral    Result Date: 3/22/2022  EXAMINATION: CT OF THE ABDOMEN AND PELVIS WITH CONTRAST 3/22/2022 1:11 pm TECHNIQUE: CT of the abdomen and pelvis was performed with the administration of intravenous contrast. Multiplanar reformatted images are provided for review. Dose modulation, iterative reconstruction, and/or weight based adjustment of the mA/kV was utilized to reduce the radiation dose to as low as reasonably achievable. COMPARISON: CT of the abdomen and pelvis, 03/08/2022. HISTORY: ORDERING SYSTEM PROVIDED HISTORY: Right lower quadrant abdominal abscess [facility]) TECHNOLOGIST PROVIDED HISTORY: Additional Contrast?->Oral STAT Creatinine as needed:->Yes FINDINGS: Lower Chest: The lung bases are clear. The heart is normal in size. No pleural or pericardial effusion. Organs: Liver: Normal in size and contour without focal lesion or ductal dilatation. Somewhat decreased attenuation of the liver may be related to the phase of contrast enhancement or related to steatosis. Gallbladder: Unremarkable. Pancreas: Mild-to-moderate fatty infiltration of the parenchyma. Otherwise, unremarkable. Spleen:  Unremarkable. Adrenals: Unremarkable. Kidneys: Small probable cysts are seen in the kidneys bilaterally. No hydronephrosis.  GI/Bowel: Compared to the previous CT from 03/08/2022 (approximately 3 weeks ago), there is significant decrease in the phlegmonous collection in the right lower quadrant. For example, it currently measures 5.8 x 4.1 cm in the maximal axial plane. Previously, along the same planes, it measured approximately 10.8 x 9.2 cm. A percutaneous drain is seen along the anterior margin of the collection. The etiology of this abscess is unclear but may be due to perforated appendicitis. While the appendix is self is not definitively identified, a tubular structure extending from the cecum to the collection likely represents a dilated appendix. The remainder of the bowel is unremarkable. Pelvis: The urinary bladder and the prostate are grossly unremarkable. Peritoneum/Retroperitoneum: Minimal atherosclerosis is seen in the abdominal aorta and pelvic arteries. Shotty lymph nodes along the pelvic sidewalls and inguinal regions are likely reactive in etiology No free air or free fluid is seen. Bones/Soft Tissues: The visualized bones are intact without fracture or focal lesion. Miscellaneous: 5.6 x 4.3 x 5.3 cm fat containing umbilical hernia. No evidence of fat strangulation. 1. Significant decrease in the right lower quadrant phlegmonous fluid collection since 03/08/2022. Percutaneous drain in situ. 2. The collection may represent sequela of perforated acute appendicitis. RECOMMENDATIONS: Unavailable     CT ABDOMEN PELVIS W IV CONTRAST Additional Contrast? Oral    Result Date: 3/8/2022  EXAMINATION: CT OF THE ABDOMEN AND PELVIS WITH CONTRAST 3/8/2022 8:58 am TECHNIQUE: CT of the abdomen and pelvis was performed with the administration of intravenous contrast. Multiplanar reformatted images are provided for review. Dose modulation, iterative reconstruction, and/or weight based adjustment of the mA/kV was utilized to reduce the radiation dose to as low as reasonably achievable.  COMPARISON: March 5, 2022 HISTORY: ORDERING SYSTEM PROVIDED HISTORY: SARABJIT ruizgma TECHNOLOGIST PROVIDED HISTORY: Additional Contrast?->Oral Reason for exam:->RLQ phlegma FINDINGS: Lower Chest: The lung bases are clear. Heart size is within normal limits. Trace pericardial fluid is present. Organs: The liver demonstrates diffusely decreased attenuation, findings which suggest but are not diagnostic of steatosis. The portal vein is patent and there is no intrahepatic biliary ductal dilatation. The gallbladder is distended without obvious abnormality. The spleen and adrenal glands are normal in size. The pancreas demonstrates mild diffuse fatty atrophy. The kidneys are without hydronephrosis or radiopaque calculus. Mild nonspecific bilateral perinephric edema is present. GI/Bowel: No evidence of a bowel obstruction, free air or pneumatosis. Portions the colon are decompressed, limiting assessment for mucosal based abnormalities. The stomach and duodenal sweep are within normal limits. The appendix is not visualized. Again noted is a multiloculated right lower quadrant fluid collection. There are least 2 components of the collection which measure 7.4 x 4 cm and 5.9 x 3.8 cm respectively. No extraluminal contrast is appreciated in this location. Pelvis: The urinary bladder is distended without obvious abnormality. The prostate gland is not enlarged. There are scattered top-normal and mildly enlarged pelvic lymph nodes on the right, including a right external iliac chain node on image 162 which measures 11 mm in short axis. Peritoneum/Retroperitoneum: The abdominal aorta is normal in caliber. No retroperitoneal lymphadenopathy or mass. Bones/Soft Tissues: No acute osseous abnormality or evidence of an aggressive osseous lesion. There is a 5.5 cm fat-containing umbilical hernia. Significant right lower quadrant inflammatory changes with adjacent (likely communicating) fluid collections measuring 7.4 x 4 cm and 5.9 x 3.8 cm, respectively. These are most consistent with abscesses.   Please note that the appendix is not visualized and one of the apparent fluid collections may be a fluid-filled dilated appendix. Additional, incidental findings as above. CT ABDOMEN PELVIS W IV CONTRAST Additional Contrast? None    Result Date: 3/5/2022  EXAMINATION: CT OF THE ABDOMEN AND PELVIS WITH CONTRAST 3/5/2022 5:29 pm TECHNIQUE: CT of the abdomen and pelvis was performed with the administration of intravenous contrast. Multiplanar reformatted images are provided for review. Dose modulation, iterative reconstruction, and/or weight based adjustment of the mA/kV was utilized to reduce the radiation dose to as low as reasonably achievable. COMPARISON: None. HISTORY: ORDERING SYSTEM PROVIDED HISTORY: lower abdominal pain TECHNOLOGIST PROVIDED HISTORY: Additional Contrast?->None Reason for exam:->lower abdominal pain Decision Support Exception - unselect if not a suspected or confirmed emergency medical condition->Emergency Medical Condition (MA) FINDINGS: Lower Chest: Visualized lungs, heart and pericardium are normal. Organs: The liver, spleen, adrenal glands, pancreas, gallbladder are unremarkable. Bilateral perinephric fat stranding. 2.2 cm exophytic left renal cyst.  No urinary tract stones or hydronephrosis. GI/Bowel: Normal large and small bowel. Pelvis: Mild diffuse urinary bladder wall thickening. Peritoneum/Retroperitoneum: Present within the right lower quadrant is a 10.0 cm x 9.0 cm probable walled-off fluid collection worrisome for abscess. This is in the region of the expected location of the appendix. Perforated appendix with periappendiceal abscess should be considered. Bones/Soft Tissues: Fat containing umbilical hernia. Mild degenerative changes visualized thoracolumbar spine. 10.0 cm x 9.0 cm or walled-off fluid collection involving the right lower quadrant in the appendiceal region worrisome for perforated appendix with associated abscess. The appendix is not visualized.  Findings discussed on 03/05/2022 with  Mark at 7:02 p.m. XR CHEST PORTABLE    Result Date: 3/5/2022  EXAMINATION: ONE XRAY VIEW OF THE CHEST 3/5/2022 4:18 pm COMPARISON: 10/18/2019 HISTORY: ORDERING SYSTEM PROVIDED HISTORY: cough TECHNOLOGIST PROVIDED HISTORY: Reason for exam:->cough FINDINGS: The lungs are without acute focal process. There is no effusion or pneumothorax. The cardiomediastinal silhouette is without acute process. The osseous structures are without acute process. No acute process. IR ABSCESS DRAINAGE PERC    Result Date: 3/9/2022  PROCEDURE: IR ABSCESS DRAIN PERC MODERATE CONSCIOUS SEDATION 3/9/2022 HISTORY: ORDERING SYSTEM PROVIDED HISTORY: drain abscess intraabdominal TECHNOLOGIST PROVIDED HISTORY: Reason for exam:->drain abscess intraabdominal TECHNIQUE: The procedure was performed under CT guidance. CONTRAST: None. SEDATION: The administration, documentation and monitoring of IV conscious sedation under my direct supervision for time frame of 30 minutes. 1 mg of IV Versed and 50 mcg of IV fentanyl was administered. Interventional radiology nursing staff monitored the patient throughout the exam. FLUOROSCOPY DOSE AND TYPE OR TIME AND EXPOSURES: CT fluoroscopy time equals 4.8 seconds. Total dose equals 1463.92 mGy cm squared. DESCRIPTION OF PROCEDURE: Informed consent was obtained after a detailed explanation of the procedure including risks, benefits, and alternatives. Universal protocol was observed. FINDINGS: The patient was placed on the CT table in the supine position and axial images through the pelvis were obtained. The phlegmon/abscess within the right lower quadrant was identified. The patient's right lower quadrant was prepped and draped in a sterile fashion and maximum sterile barrier technique. Following the uneventful administration of lidocaine I introduced a 5 Western Sariah Yueh catheter into the abscess cavity. Through the 5 Western Sariah Yueh catheter an Amplatz wire was advanced.   2 dilators were used to dilate a tract into the abscess cavity. I then placed a 10 Western Sariah multi-side hole pigtail drainage catheter within the abscess cavity. 20 ml of purulent material was aspirated. A sample was sent to microbiology for culture and sensitivity. The patient tolerated the procedure well and there are no complications. 1. Successful placement of a 10 French multi-side hole pigtail drainage catheter within the right lower quadrant abscess/phlegmon. 2. Administration of conscious sedation.         Assessment/Plan:    Diabetes mellitus type 2 controlled improving  Continue current meds and follow fingerstick blood sugar twice a day bring glucometer with him next visit  Hyperlipidemia uncontrolled patient was advised to take atorvastatin 40 mg tablet daily diligently repeat lipid panel before next visit  Albuminuria due to type 2 diabetes mellitus, will start small dose ACE inhibitor lisinopril 5 mg tablet daily    Outpatient Encounter Medications as of 3/31/2022   Medication Sig Dispense Refill    lisinopril (PRINIVIL;ZESTRIL) 5 MG tablet Take 1 tablet by mouth daily 30 tablet 0    empagliflozin (JARDIANCE) 10 MG tablet Take 1 tablet by mouth daily 90 tablet 0    atorvastatin (LIPITOR) 40 MG tablet Take 1 tablet by mouth daily 90 tablet 0    cefdinir (OMNICEF) 300 MG capsule Take 1 capsule by mouth 2 times daily 60 capsule 0    insulin glargine (LANTUS SOLOSTAR) 100 UNIT/ML injection pen Inject 60 Units into the skin nightly 20 pen 0    acetaminophen (TYLENOL) 500 MG tablet Take 2 tablets by mouth every 8 hours as needed for Pain 60 tablet 0    Glucose Blood (BLOOD GLUCOSE TEST STRIPS) STRP 1 each by In Vitro route 4 times daily (with meals and nightly) 100 strip 0    FREESTYLE LANCETS MISC 1 each by Does not apply route 4 times daily as needed (blood sugar) 100 each 0    Blood Glucose Monitoring Suppl (D-CARE GLUCOMETER) w/Device KIT 1 each by Does not apply route 4 times daily (with meals and nightly) 1 kit 0    Insulin Pen Needle 32G X 4 MM MISC 1 each by Does not apply route 4 times daily (before meals and nightly) 100 each 0    loratadine (CLARITIN) 10 MG tablet Take 10 mg by mouth daily as needed       [DISCONTINUED] atorvastatin (LIPITOR) 40 MG tablet Take 1 tablet by mouth daily 90 tablet 0    [DISCONTINUED] empagliflozin (JARDIANCE) 10 MG tablet Take 1 tablet by mouth daily 30 tablet 0    sodium hypochlorite (DAKINS) 0.25 % SOLN Irrigate with 15 mLs as directed daily (Patient not taking: Reported on 3/23/2022) 1 Bottle 0    nystatin (MYCOSTATIN) 313480 UNIT/ML suspension Take 5 mLs by mouth 4 times daily (Patient not taking: Reported on 3/15/2022) 60 mL 0    insulin lispro (HUMALOG KWIKPEN) 100 UNIT/ML pen Inject 15 Units into the skin 3 times daily (before meals) (Patient not taking: Reported on 3/23/2022) 5 Pen 0     No facility-administered encounter medications on file as of 3/31/2022. Nayana Murphy was seen today for follow-up, other, diabetes and other. Diagnoses and all orders for this visit:    Type 2 diabetes mellitus with diabetic neuropathy, with long-term current use of insulin (HCC)  -     MICROALBUMIN / CREATININE URINE RATIO; Future  -     Comprehensive Metabolic Panel; Future  -     empagliflozin (JARDIANCE) 10 MG tablet; Take 1 tablet by mouth daily    Mixed hyperlipidemia  -     LIPID PANEL; Future  -     atorvastatin (LIPITOR) 40 MG tablet; Take 1 tablet by mouth daily    Morbid (severe) obesity due to excess calories (HCC)    Proteinuria due to type 2 diabetes mellitus (HCC)  -     lisinopril (PRINIVIL;ZESTRIL) 5 MG tablet; Take 1 tablet by mouth daily  -     MICROALBUMIN / CREATININE URINE RATIO; Future    Health care maintenance  -     Hepatitis C Antibody; Future  -     HIV Screen;  Future         Patient Instructions   Patient was advised to start lisinopril 5 mg tablet daily  To take Lipitor 40 mg daily as prescribed  Continue Jardiance 10 mg tablet daily  Get blood work and urine test done before next visit in 2-month  Get a new glucometer because the existing glucometer readings do not correspond with  lab drawn blood sugars  Consult podiatry             Pilo Christian MD   3/31/22

## 2022-04-01 DIAGNOSIS — R80.9 PROTEINURIA DUE TO TYPE 2 DIABETES MELLITUS (HCC): ICD-10-CM

## 2022-04-01 DIAGNOSIS — E11.29 PROTEINURIA DUE TO TYPE 2 DIABETES MELLITUS (HCC): ICD-10-CM

## 2022-04-01 DIAGNOSIS — Z79.4 TYPE 2 DIABETES MELLITUS WITH DIABETIC NEUROPATHY, WITH LONG-TERM CURRENT USE OF INSULIN (HCC): ICD-10-CM

## 2022-04-01 DIAGNOSIS — E11.40 TYPE 2 DIABETES MELLITUS WITH DIABETIC NEUROPATHY, WITH LONG-TERM CURRENT USE OF INSULIN (HCC): ICD-10-CM

## 2022-04-01 DIAGNOSIS — E78.2 MIXED HYPERLIPIDEMIA: ICD-10-CM

## 2022-04-18 DIAGNOSIS — E11.29 PROTEINURIA DUE TO TYPE 2 DIABETES MELLITUS (HCC): ICD-10-CM

## 2022-04-18 DIAGNOSIS — R80.9 PROTEINURIA DUE TO TYPE 2 DIABETES MELLITUS (HCC): ICD-10-CM

## 2022-04-19 RX ORDER — LISINOPRIL 5 MG/1
5 TABLET ORAL DAILY
Qty: 90 TABLET | Refills: 0 | Status: SHIPPED
Start: 2022-04-19 | End: 2022-09-20 | Stop reason: SDUPTHER

## 2022-04-19 RX ORDER — LORATADINE 10 MG/1
10 TABLET ORAL DAILY
Qty: 90 TABLET | Refills: 0 | Status: SHIPPED
Start: 2022-04-19 | End: 2022-09-20 | Stop reason: SDUPTHER

## 2022-05-13 VITALS
HEIGHT: 74 IN | TEMPERATURE: 98.3 F | OXYGEN SATURATION: 96 % | DIASTOLIC BLOOD PRESSURE: 60 MMHG | WEIGHT: 315 LBS | BODY MASS INDEX: 40.43 KG/M2 | HEART RATE: 89 BPM | SYSTOLIC BLOOD PRESSURE: 122 MMHG

## 2022-05-13 RX ORDER — ERGOCALCIFEROL 1.25 MG/1
50000 CAPSULE ORAL WEEKLY
COMMUNITY
End: 2022-05-25

## 2022-05-25 ENCOUNTER — APPOINTMENT (OUTPATIENT)
Dept: GENERAL RADIOLOGY | Age: 57
DRG: 720 | End: 2022-05-25
Payer: COMMERCIAL

## 2022-05-25 ENCOUNTER — HOSPITAL ENCOUNTER (INPATIENT)
Age: 57
LOS: 6 days | Discharge: HOME | DRG: 720 | End: 2022-05-31
Attending: EMERGENCY MEDICINE | Admitting: STUDENT IN AN ORGANIZED HEALTH CARE EDUCATION/TRAINING PROGRAM
Payer: COMMERCIAL

## 2022-05-25 ENCOUNTER — APPOINTMENT (OUTPATIENT)
Dept: CT IMAGING | Age: 57
DRG: 720 | End: 2022-05-25
Payer: COMMERCIAL

## 2022-05-25 DIAGNOSIS — M86.172 OSTEOMYELITIS OF ANKLE OR FOOT, LEFT, ACUTE (HCC): ICD-10-CM

## 2022-05-25 DIAGNOSIS — E11.40 TYPE 2 DIABETES MELLITUS WITH DIABETIC NEUROPATHY, WITH LONG-TERM CURRENT USE OF INSULIN (HCC): ICD-10-CM

## 2022-05-25 DIAGNOSIS — K65.1 INTRA-ABDOMINAL ABSCESS (HCC): ICD-10-CM

## 2022-05-25 DIAGNOSIS — Z79.4 TYPE 2 DIABETES MELLITUS WITH DIABETIC NEUROPATHY, WITH LONG-TERM CURRENT USE OF INSULIN (HCC): ICD-10-CM

## 2022-05-25 DIAGNOSIS — L03.119 CELLULITIS OF FOOT: Primary | ICD-10-CM

## 2022-05-25 DIAGNOSIS — E11.10 DKA, TYPE 2, NOT AT GOAL (HCC): ICD-10-CM

## 2022-05-25 LAB
ACETAMINOPHEN LEVEL: <5 MCG/ML (ref 10–30)
ALBUMIN SERPL-MCNC: 3.6 G/DL (ref 3.5–5.2)
ALP BLD-CCNC: 143 U/L (ref 40–129)
ALT SERPL-CCNC: 18 U/L (ref 0–40)
AMPHETAMINE SCREEN, URINE: NOT DETECTED
AMPHETAMINE SCREEN, URINE: NOT DETECTED
ANION GAP SERPL CALCULATED.3IONS-SCNC: 23 MMOL/L (ref 7–16)
ANION GAP SERPL CALCULATED.3IONS-SCNC: 26 MMOL/L (ref 7–16)
ANION GAP SERPL CALCULATED.3IONS-SCNC: 28 MMOL/L (ref 7–16)
ANION GAP SERPL CALCULATED.3IONS-SCNC: 29 MMOL/L (ref 7–16)
AST SERPL-CCNC: 17 U/L (ref 0–39)
B.E.: -20.5 MMOL/L (ref -3–3)
BACTERIA: ABNORMAL /HPF
BARBITURATE SCREEN URINE: NOT DETECTED
BARBITURATE SCREEN URINE: NOT DETECTED
BASOPHILS ABSOLUTE: 0.08 E9/L (ref 0–0.2)
BASOPHILS RELATIVE PERCENT: 0.4 % (ref 0–2)
BENZODIAZEPINE SCREEN, URINE: NOT DETECTED
BENZODIAZEPINE SCREEN, URINE: NOT DETECTED
BETA-HYDROXYBUTYRATE: >4.5 MMOL/L (ref 0.02–0.27)
BILIRUB SERPL-MCNC: 0.3 MG/DL (ref 0–1.2)
BILIRUBIN URINE: ABNORMAL
BLOOD, URINE: ABNORMAL
BUN BLDV-MCNC: 15 MG/DL (ref 6–20)
BUN BLDV-MCNC: 16 MG/DL (ref 6–20)
BUN BLDV-MCNC: 17 MG/DL (ref 6–20)
BUN BLDV-MCNC: 17 MG/DL (ref 6–20)
CALCIUM SERPL-MCNC: 8.1 MG/DL (ref 8.6–10.2)
CALCIUM SERPL-MCNC: 8.5 MG/DL (ref 8.6–10.2)
CALCIUM SERPL-MCNC: 9.4 MG/DL (ref 8.6–10.2)
CALCIUM SERPL-MCNC: 9.5 MG/DL (ref 8.6–10.2)
CANNABINOID SCREEN URINE: NOT DETECTED
CANNABINOID SCREEN URINE: NOT DETECTED
CARDIOPULMONARY BYPASS: NO
CHLORIDE BLD-SCNC: 100 MMOL/L (ref 98–107)
CHLORIDE BLD-SCNC: 94 MMOL/L (ref 98–107)
CHLORIDE BLD-SCNC: 95 MMOL/L (ref 98–107)
CHLORIDE BLD-SCNC: 98 MMOL/L (ref 98–107)
CLARITY: CLEAR
CO2: 6 MMOL/L (ref 22–29)
CO2: 8 MMOL/L (ref 22–29)
CO2: 8 MMOL/L (ref 22–29)
CO2: 9 MMOL/L (ref 22–29)
COCAINE METABOLITE SCREEN URINE: NOT DETECTED
COCAINE METABOLITE SCREEN URINE: NOT DETECTED
COLOR: YELLOW
CREAT SERPL-MCNC: 0.6 MG/DL (ref 0.7–1.2)
CREAT SERPL-MCNC: 0.6 MG/DL (ref 0.7–1.2)
CREAT SERPL-MCNC: 0.8 MG/DL (ref 0.7–1.2)
CREAT SERPL-MCNC: 0.8 MG/DL (ref 0.7–1.2)
CRITICAL NOTIFICATION: YES
DELIVERY SYSTEMS: ABNORMAL
DEVICE: ABNORMAL
EKG ATRIAL RATE: 107 BPM
EKG P AXIS: 58 DEGREES
EKG P-R INTERVAL: 206 MS
EKG Q-T INTERVAL: 356 MS
EKG QRS DURATION: 70 MS
EKG QTC CALCULATION (BAZETT): 472 MS
EKG R AXIS: 4 DEGREES
EKG T AXIS: 33 DEGREES
EKG VENTRICULAR RATE: 106 BPM
EOSINOPHILS ABSOLUTE: 0 E9/L (ref 0.05–0.5)
EOSINOPHILS RELATIVE PERCENT: 0 % (ref 0–6)
ETHANOL: <10 MG/DL (ref 0–0.08)
FENTANYL SCREEN, URINE: NOT DETECTED
FENTANYL SCREEN, URINE: NOT DETECTED
GFR AFRICAN AMERICAN: >60
GFR NON-AFRICAN AMERICAN: >60 ML/MIN/1.73
GLUCOSE BLD-MCNC: 225 MG/DL (ref 74–99)
GLUCOSE BLD-MCNC: 226 MG/DL (ref 74–99)
GLUCOSE BLD-MCNC: 240 MG/DL (ref 74–99)
GLUCOSE BLD-MCNC: 242 MG/DL (ref 74–99)
GLUCOSE URINE: 500 MG/DL
HCO3: 5 MMOL/L (ref 22–26)
HCT VFR BLD CALC: 46.7 % (ref 37–54)
HEMOGLOBIN: 15.6 G/DL (ref 12.5–16.5)
IMMATURE GRANULOCYTES #: 0.3 E9/L
IMMATURE GRANULOCYTES %: 1.5 % (ref 0–5)
INFLUENZA A: NOT DETECTED
INFLUENZA B: NOT DETECTED
INR BLD: 1.2
KETONES, URINE: >=80 MG/DL
LACTIC ACID: 2 MMOL/L (ref 0.5–2.2)
LEUKOCYTE ESTERASE, URINE: NEGATIVE
LYMPHOCYTES ABSOLUTE: 1.78 E9/L (ref 1.5–4)
LYMPHOCYTES RELATIVE PERCENT: 8.6 % (ref 20–42)
Lab: NORMAL
Lab: NORMAL
MAGNESIUM: 2.4 MG/DL (ref 1.6–2.6)
MAGNESIUM: 2.6 MG/DL (ref 1.6–2.6)
MCH RBC QN AUTO: 29.2 PG (ref 26–35)
MCHC RBC AUTO-ENTMCNC: 33.4 % (ref 32–34.5)
MCV RBC AUTO: 87.3 FL (ref 80–99.9)
METER GLUCOSE: 211 MG/DL (ref 74–99)
METER GLUCOSE: 213 MG/DL (ref 74–99)
METER GLUCOSE: 225 MG/DL (ref 74–99)
METER GLUCOSE: 263 MG/DL (ref 74–99)
METHADONE SCREEN, URINE: NOT DETECTED
METHADONE SCREEN, URINE: NOT DETECTED
MONOCYTES ABSOLUTE: 1.82 E9/L (ref 0.1–0.95)
MONOCYTES RELATIVE PERCENT: 8.8 % (ref 2–12)
NEUTROPHILS ABSOLUTE: 16.66 E9/L (ref 1.8–7.3)
NEUTROPHILS RELATIVE PERCENT: 80.7 % (ref 43–80)
NITRITE, URINE: NEGATIVE
O2 SATURATION: 96.5 % (ref 92–98.5)
OPERATOR ID: ABNORMAL
OPIATE SCREEN URINE: NOT DETECTED
OPIATE SCREEN URINE: NOT DETECTED
OSMOLALITY: 309 MOSM/KG (ref 285–310)
OXYCODONE URINE: NOT DETECTED
OXYCODONE URINE: NOT DETECTED
PCO2 37: 14.2 MMHG (ref 35–45)
PDW BLD-RTO: 14.2 FL (ref 11.5–15)
PH 37: 7.16 (ref 7.35–7.45)
PH UA: 5.5 (ref 5–9)
PH VENOUS: 7.14 (ref 7.35–7.45)
PHENCYCLIDINE SCREEN URINE: NOT DETECTED
PHENCYCLIDINE SCREEN URINE: NOT DETECTED
PHOSPHORUS: 2.2 MG/DL (ref 2.5–4.5)
PLATELET # BLD: 414 E9/L (ref 130–450)
PMV BLD AUTO: 9.7 FL (ref 7–12)
PO2 37: 106.1 MMHG (ref 80–100)
POC SOURCE: ABNORMAL
POTASSIUM SERPL-SCNC: 4.3 MMOL/L (ref 3.5–5)
POTASSIUM SERPL-SCNC: 4.5 MMOL/L (ref 3.5–5)
POTASSIUM SERPL-SCNC: 4.7 MMOL/L (ref 3.5–5)
POTASSIUM SERPL-SCNC: 4.8 MMOL/L (ref 3.5–5)
PROTEIN UA: 30 MG/DL
PROTHROMBIN TIME: 13.1 SEC (ref 9.3–12.4)
RBC # BLD: 5.35 E12/L (ref 3.8–5.8)
RBC # BLD: NORMAL 10*6/UL
RBC UA: ABNORMAL /HPF (ref 0–2)
SALICYLATE, SERUM: <0.3 MG/DL (ref 0–30)
SARS-COV-2 RNA, RT PCR: NOT DETECTED
SODIUM BLD-SCNC: 130 MMOL/L (ref 132–146)
SODIUM BLD-SCNC: 131 MMOL/L (ref 132–146)
SODIUM BLD-SCNC: 131 MMOL/L (ref 132–146)
SODIUM BLD-SCNC: 132 MMOL/L (ref 132–146)
SPECIFIC GRAVITY UA: >=1.03 (ref 1–1.03)
TOTAL CK: 100 U/L (ref 20–200)
TOTAL PROTEIN: 8.8 G/DL (ref 6.4–8.3)
TRICYCLIC ANTIDEPRESSANTS SCREEN SERUM: NEGATIVE NG/ML
TROPONIN, HIGH SENSITIVITY: 15 NG/L (ref 0–11)
TROPONIN, HIGH SENSITIVITY: 18 NG/L (ref 0–11)
UROBILINOGEN, URINE: 0.2 E.U./DL
WBC # BLD: 20.6 E9/L (ref 4.5–11.5)
WBC UA: ABNORMAL /HPF (ref 0–5)

## 2022-05-25 PROCEDURE — 84100 ASSAY OF PHOSPHORUS: CPT

## 2022-05-25 PROCEDURE — 82800 BLOOD PH: CPT

## 2022-05-25 PROCEDURE — 2580000003 HC RX 258: Performed by: STUDENT IN AN ORGANIZED HEALTH CARE EDUCATION/TRAINING PROGRAM

## 2022-05-25 PROCEDURE — 96367 TX/PROPH/DG ADDL SEQ IV INF: CPT

## 2022-05-25 PROCEDURE — 81001 URINALYSIS AUTO W/SCOPE: CPT

## 2022-05-25 PROCEDURE — 85610 PROTHROMBIN TIME: CPT

## 2022-05-25 PROCEDURE — 83735 ASSAY OF MAGNESIUM: CPT

## 2022-05-25 PROCEDURE — 85025 COMPLETE CBC W/AUTO DIFF WBC: CPT

## 2022-05-25 PROCEDURE — 96366 THER/PROPH/DIAG IV INF ADDON: CPT

## 2022-05-25 PROCEDURE — 6360000002 HC RX W HCPCS: Performed by: STUDENT IN AN ORGANIZED HEALTH CARE EDUCATION/TRAINING PROGRAM

## 2022-05-25 PROCEDURE — 99285 EMERGENCY DEPT VISIT HI MDM: CPT

## 2022-05-25 PROCEDURE — 2000000000 HC ICU R&B

## 2022-05-25 PROCEDURE — 96374 THER/PROPH/DIAG INJ IV PUSH: CPT

## 2022-05-25 PROCEDURE — 2580000003 HC RX 258: Performed by: EMERGENCY MEDICINE

## 2022-05-25 PROCEDURE — 82010 KETONE BODYS QUAN: CPT

## 2022-05-25 PROCEDURE — 2500000003 HC RX 250 WO HCPCS: Performed by: EMERGENCY MEDICINE

## 2022-05-25 PROCEDURE — 6370000000 HC RX 637 (ALT 250 FOR IP): Performed by: STUDENT IN AN ORGANIZED HEALTH CARE EDUCATION/TRAINING PROGRAM

## 2022-05-25 PROCEDURE — 84484 ASSAY OF TROPONIN QUANT: CPT

## 2022-05-25 PROCEDURE — 2500000003 HC RX 250 WO HCPCS: Performed by: STUDENT IN AN ORGANIZED HEALTH CARE EDUCATION/TRAINING PROGRAM

## 2022-05-25 PROCEDURE — 74177 CT ABD & PELVIS W/CONTRAST: CPT

## 2022-05-25 PROCEDURE — 83605 ASSAY OF LACTIC ACID: CPT

## 2022-05-25 PROCEDURE — 6360000002 HC RX W HCPCS: Performed by: EMERGENCY MEDICINE

## 2022-05-25 PROCEDURE — 96361 HYDRATE IV INFUSION ADD-ON: CPT

## 2022-05-25 PROCEDURE — 6360000004 HC RX CONTRAST MEDICATION: Performed by: EMERGENCY MEDICINE

## 2022-05-25 PROCEDURE — 96365 THER/PROPH/DIAG IV INF INIT: CPT

## 2022-05-25 PROCEDURE — 80053 COMPREHEN METABOLIC PANEL: CPT

## 2022-05-25 PROCEDURE — 96375 TX/PRO/DX INJ NEW DRUG ADDON: CPT

## 2022-05-25 PROCEDURE — 71045 X-RAY EXAM CHEST 1 VIEW: CPT

## 2022-05-25 PROCEDURE — 71250 CT THORAX DX C-: CPT

## 2022-05-25 PROCEDURE — 36415 COLL VENOUS BLD VENIPUNCTURE: CPT

## 2022-05-25 PROCEDURE — 87636 SARSCOV2 & INF A&B AMP PRB: CPT

## 2022-05-25 PROCEDURE — 80048 BASIC METABOLIC PNL TOTAL CA: CPT

## 2022-05-25 PROCEDURE — 82962 GLUCOSE BLOOD TEST: CPT

## 2022-05-25 PROCEDURE — 93005 ELECTROCARDIOGRAM TRACING: CPT | Performed by: EMERGENCY MEDICINE

## 2022-05-25 RX ORDER — LISINOPRIL 5 MG/1
5 TABLET ORAL DAILY
Status: DISCONTINUED | OUTPATIENT
Start: 2022-05-25 | End: 2022-05-31 | Stop reason: HOSPADM

## 2022-05-25 RX ORDER — ONDANSETRON 2 MG/ML
4 INJECTION INTRAMUSCULAR; INTRAVENOUS ONCE
Status: DISCONTINUED | OUTPATIENT
Start: 2022-05-25 | End: 2022-05-25

## 2022-05-25 RX ORDER — SODIUM CHLORIDE 0.9 % (FLUSH) 0.9 %
10 SYRINGE (ML) INJECTION PRN
Status: DISCONTINUED | OUTPATIENT
Start: 2022-05-25 | End: 2022-05-26

## 2022-05-25 RX ORDER — 0.9 % SODIUM CHLORIDE 0.9 %
1000 INTRAVENOUS SOLUTION INTRAVENOUS ONCE
Status: COMPLETED | OUTPATIENT
Start: 2022-05-25 | End: 2022-05-25

## 2022-05-25 RX ORDER — INSULIN LISPRO 100 [IU]/ML
0-12 INJECTION, SOLUTION INTRAVENOUS; SUBCUTANEOUS EVERY 4 HOURS
Status: DISCONTINUED | OUTPATIENT
Start: 2022-05-25 | End: 2022-05-25

## 2022-05-25 RX ORDER — ONDANSETRON 2 MG/ML
4 INJECTION INTRAMUSCULAR; INTRAVENOUS EVERY 6 HOURS PRN
Status: DISCONTINUED | OUTPATIENT
Start: 2022-05-25 | End: 2022-05-31 | Stop reason: HOSPADM

## 2022-05-25 RX ORDER — INSULIN GLARGINE-YFGN 100 [IU]/ML
40 INJECTION, SOLUTION SUBCUTANEOUS NIGHTLY
Status: DISCONTINUED | OUTPATIENT
Start: 2022-05-25 | End: 2022-05-25

## 2022-05-25 RX ORDER — SODIUM CHLORIDE 9 MG/ML
INJECTION, SOLUTION INTRAVENOUS CONTINUOUS
Status: DISCONTINUED | OUTPATIENT
Start: 2022-05-25 | End: 2022-05-26

## 2022-05-25 RX ORDER — MAGNESIUM SULFATE 1 G/100ML
1000 INJECTION INTRAVENOUS PRN
Status: DISCONTINUED | OUTPATIENT
Start: 2022-05-25 | End: 2022-05-31 | Stop reason: HOSPADM

## 2022-05-25 RX ORDER — ONDANSETRON 2 MG/ML
4 INJECTION INTRAMUSCULAR; INTRAVENOUS ONCE
Status: COMPLETED | OUTPATIENT
Start: 2022-05-25 | End: 2022-05-25

## 2022-05-25 RX ORDER — DEXTROSE MONOHYDRATE 50 MG/ML
100 INJECTION, SOLUTION INTRAVENOUS PRN
Status: DISCONTINUED | OUTPATIENT
Start: 2022-05-25 | End: 2022-05-25

## 2022-05-25 RX ORDER — ENOXAPARIN SODIUM 100 MG/ML
30 INJECTION SUBCUTANEOUS 2 TIMES DAILY
Status: DISCONTINUED | OUTPATIENT
Start: 2022-05-25 | End: 2022-05-31 | Stop reason: HOSPADM

## 2022-05-25 RX ORDER — ONDANSETRON 4 MG/1
4 TABLET, ORALLY DISINTEGRATING ORAL EVERY 8 HOURS PRN
Status: DISCONTINUED | OUTPATIENT
Start: 2022-05-25 | End: 2022-05-31 | Stop reason: HOSPADM

## 2022-05-25 RX ORDER — POTASSIUM CHLORIDE 7.45 MG/ML
10 INJECTION INTRAVENOUS PRN
Status: DISCONTINUED | OUTPATIENT
Start: 2022-05-25 | End: 2022-05-31 | Stop reason: HOSPADM

## 2022-05-25 RX ORDER — 0.9 % SODIUM CHLORIDE 0.9 %
1000 INTRAVENOUS SOLUTION INTRAVENOUS ONCE
Status: DISCONTINUED | OUTPATIENT
Start: 2022-05-25 | End: 2022-05-25

## 2022-05-25 RX ORDER — SODIUM CHLORIDE 9 MG/ML
INJECTION, SOLUTION INTRAVENOUS PRN
Status: DISCONTINUED | OUTPATIENT
Start: 2022-05-25 | End: 2022-05-26

## 2022-05-25 RX ORDER — SODIUM CHLORIDE, SODIUM LACTATE, POTASSIUM CHLORIDE, CALCIUM CHLORIDE 600; 310; 30; 20 MG/100ML; MG/100ML; MG/100ML; MG/100ML
INJECTION, SOLUTION INTRAVENOUS CONTINUOUS
Status: DISCONTINUED | OUTPATIENT
Start: 2022-05-25 | End: 2022-05-25

## 2022-05-25 RX ORDER — SODIUM CHLORIDE 0.9 % (FLUSH) 0.9 %
10 SYRINGE (ML) INJECTION EVERY 12 HOURS SCHEDULED
Status: DISCONTINUED | OUTPATIENT
Start: 2022-05-25 | End: 2022-05-26

## 2022-05-25 RX ORDER — DEXTROSE AND SODIUM CHLORIDE 5; .45 G/100ML; G/100ML
INJECTION, SOLUTION INTRAVENOUS CONTINUOUS PRN
Status: DISCONTINUED | OUTPATIENT
Start: 2022-05-25 | End: 2022-05-27

## 2022-05-25 RX ORDER — ATORVASTATIN CALCIUM 40 MG/1
40 TABLET, FILM COATED ORAL DAILY
Status: DISCONTINUED | OUTPATIENT
Start: 2022-05-25 | End: 2022-05-31 | Stop reason: HOSPADM

## 2022-05-25 RX ORDER — ACETAMINOPHEN 325 MG/1
650 TABLET ORAL EVERY 6 HOURS
Status: DISCONTINUED | OUTPATIENT
Start: 2022-05-25 | End: 2022-05-26

## 2022-05-25 RX ADMIN — SODIUM CHLORIDE 0.1 UNITS/KG/HR: 9 INJECTION, SOLUTION INTRAVENOUS at 21:08

## 2022-05-25 RX ADMIN — SODIUM BICARBONATE 50 MEQ: 84 INJECTION, SOLUTION INTRAVENOUS at 10:31

## 2022-05-25 RX ADMIN — SODIUM BICARBONATE: 84 INJECTION, SOLUTION INTRAVENOUS at 12:34

## 2022-05-25 RX ADMIN — SODIUM CHLORIDE 1000 ML: 9 INJECTION, SOLUTION INTRAVENOUS at 11:10

## 2022-05-25 RX ADMIN — PIPERACILLIN AND TAZOBACTAM 3375 MG: 3; .375 INJECTION, POWDER, LYOPHILIZED, FOR SOLUTION INTRAVENOUS at 13:46

## 2022-05-25 RX ADMIN — IOPAMIDOL 75 ML: 755 INJECTION, SOLUTION INTRAVENOUS at 11:55

## 2022-05-25 RX ADMIN — VANCOMYCIN HYDROCHLORIDE 2000 MG: 10 INJECTION, POWDER, LYOPHILIZED, FOR SOLUTION INTRAVENOUS at 16:43

## 2022-05-25 RX ADMIN — DEXTROSE AND SODIUM CHLORIDE: 5; 450 INJECTION, SOLUTION INTRAVENOUS at 21:18

## 2022-05-25 RX ADMIN — SODIUM BICARBONATE: 84 INJECTION, SOLUTION INTRAVENOUS at 19:56

## 2022-05-25 RX ADMIN — SODIUM CHLORIDE 1000 ML: 9 INJECTION, SOLUTION INTRAVENOUS at 09:30

## 2022-05-25 RX ADMIN — SODIUM BICARBONATE 50 MEQ: 84 INJECTION, SOLUTION INTRAVENOUS at 11:24

## 2022-05-25 RX ADMIN — ONDANSETRON 4 MG: 2 INJECTION INTRAMUSCULAR; INTRAVENOUS at 09:46

## 2022-05-25 RX ADMIN — SODIUM CHLORIDE 1000 ML: 9 INJECTION, SOLUTION INTRAVENOUS at 12:35

## 2022-05-25 RX ADMIN — SODIUM BICARBONATE 50 MEQ: 84 INJECTION, SOLUTION INTRAVENOUS at 11:09

## 2022-05-25 ASSESSMENT — ENCOUNTER SYMPTOMS
EYE DISCHARGE: 0
BACK PAIN: 0
COLOR CHANGE: 0
EYE REDNESS: 0
DIARRHEA: 1
NAUSEA: 1
SINUS PAIN: 0
CHEST TIGHTNESS: 0
ABDOMINAL DISTENTION: 0
EYE PAIN: 0
VOMITING: 1
ABDOMINAL PAIN: 0
APNEA: 0
SORE THROAT: 0
CONSTIPATION: 0
EYE ITCHING: 0
COUGH: 0
CHOKING: 0
SHORTNESS OF BREATH: 0
ANAL BLEEDING: 0
SINUS PRESSURE: 0

## 2022-05-25 ASSESSMENT — PAIN - FUNCTIONAL ASSESSMENT
PAIN_FUNCTIONAL_ASSESSMENT: NONE - DENIES PAIN
PAIN_FUNCTIONAL_ASSESSMENT: NONE - DENIES PAIN

## 2022-05-25 NOTE — ED NOTES
Transport arranged for PT to go to [facility].  Moraima w/ Katie GARCIA [address] [facility]  05/25/22 [id number]

## 2022-05-25 NOTE — ED PROVIDER NOTES
EXAM--------------------------------------  Constitutional:  Well developed, well nourished, obese, no acute distress, non-toxic appearance   Eyes:  PERRL, conjunctiva normal, EOMI  HENT:  Atraumatic, external ears normal, nose normal, oropharynx dry. Neck- normal range of motion, no nuchal rigidity   Respiratory:  No respiratory distress, normal breath sounds, no rales, no wheezing   Cardiovascular:  Tachycardic rate, normal rhythm, no murmurs, no gallops, no rubs. Radial and DP pulses 2+ bilaterally. Compartments are soft. He is warm and well perfused. Cap refill less than 3 seconds. GI:  Soft, nondistended, normal bowel sounds, diffusely tender, obese but not rigid and without rebound. :  No costovertebral angle tenderness   Musculoskeletal:  No edema, no tenderness, no deformities. Back- no tenderness  Integument:  Well hydrated, no rash. Adequate perfusion. Neurologic:  Alert & oriented x 3, CN 2-12 normal, no focal deficits noted. Psychiatric:  Speech and behavior appropriate       -------------------------------------------------- RESULTS -------------------------------------------------  I have personally reviewed all laboratory and imaging results for this patient. Results are listed below.      LABS:  Results for orders placed or performed during the hospital encounter of 05/25/22   COVID-19 & Influenza Combo    Specimen: Nasopharyngeal Swab   Result Value Ref Range    SARS-CoV-2 RNA, RT PCR NOT DETECTED NOT DETECTED    INFLUENZA A NOT DETECTED NOT DETECTED    INFLUENZA B NOT DETECTED NOT DETECTED   CBC with Auto Differential   Result Value Ref Range    WBC 20.6 (H) 4.5 - 11.5 E9/L    RBC 5.35 3.80 - 5.80 E12/L    Hemoglobin 15.6 12.5 - 16.5 g/dL    Hematocrit 46.7 37.0 - 54.0 %    MCV 87.3 80.0 - 99.9 fL    MCH 29.2 26.0 - 35.0 pg    MCHC 33.4 32.0 - 34.5 %    RDW 14.2 11.5 - 15.0 fL    Platelets 203 292 - 399 E9/L    MPV 9.7 7.0 - 12.0 fL    Neutrophils % 80.7 (H) 43.0 - 80.0 %    Immature Granulocytes % 1.5 0.0 - 5.0 %    Lymphocytes % 8.6 (L) 20.0 - 42.0 %    Monocytes % 8.8 2.0 - 12.0 %    Eosinophils % 0.0 0.0 - 6.0 %    Basophils % 0.4 0.0 - 2.0 %    Neutrophils Absolute 16.66 (H) 1.80 - 7.30 E9/L    Immature Granulocytes # 0.30 E9/L    Lymphocytes Absolute 1.78 1.50 - 4.00 E9/L    Monocytes Absolute 1.82 (H) 0.10 - 0.95 E9/L    Eosinophils Absolute 0.00 (L) 0.05 - 0.50 E9/L    Basophils Absolute 0.08 0.00 - 0.20 E9/L    RBC Morphology Normal    Comprehensive metabolic panel   Result Value Ref Range    Sodium 131 (L) 132 - 146 mmol/L    Potassium 4.3 3.5 - 5.0 mmol/L    Chloride 95 (L) 98 - 107 mmol/L    CO2 8 (LL) 22 - 29 mmol/L    Anion Gap 28 (H) 7 - 16 mmol/L    Glucose 225 (H) 74 - 99 mg/dL    BUN 17 6 - 20 mg/dL    CREATININE 0.8 0.7 - 1.2 mg/dL    GFR Non-African American >60 >=60 mL/min/1.73    GFR African American >60     Calcium 9.5 8.6 - 10.2 mg/dL    Total Protein 8.8 (H) 6.4 - 8.3 g/dL    Albumin 3.6 3.5 - 5.2 g/dL    Total Bilirubin 0.3 0.0 - 1.2 mg/dL    Alkaline Phosphatase 143 (H) 40 - 129 U/L    ALT 18 0 - 40 U/L    AST 17 0 - 39 U/L   Troponin   Result Value Ref Range    Troponin, High Sensitivity 18 (H) 0 - 11 ng/L   Lactic acid, plasma   Result Value Ref Range    Lactic Acid 2.0 0.5 - 2.2 mmol/L   Urinalysis with Microscopic   Result Value Ref Range    Color, UA Yellow Straw/Yellow    Clarity, UA Clear Clear    Glucose, Ur 500 (A) Negative mg/dL    Bilirubin Urine SMALL (A) Negative    Ketones, Urine >=80 (A) Negative mg/dL    Specific Gravity, UA >=1.030 1.005 - 1.030    Blood, Urine MODERATE (A) Negative    pH, UA 5.5 5.0 - 9.0    Protein, UA 30 (A) Negative mg/dL    Urobilinogen, Urine 0.2 <2.0 E.U./dL    Nitrite, Urine Negative Negative    Leukocyte Esterase, Urine Negative Negative    WBC, UA 0-1 0 - 5 /HPF    RBC, UA 1-3 0 - 2 /HPF    Bacteria, UA RARE (A) None Seen /HPF   Magnesium   Result Value Ref Range    Magnesium 2.4 1.6 - 2.6 mg/dL   Basic Metabolic Panel Result Value Ref Range    Sodium 131 (L) 132 - 146 mmol/L    Potassium 4.7 3.5 - 5.0 mmol/L    Chloride 94 (L) 98 - 107 mmol/L    CO2 8 (LL) 22 - 29 mmol/L    Anion Gap 29 (H) 7 - 16 mmol/L    Glucose 226 (H) 74 - 99 mg/dL    BUN 17 6 - 20 mg/dL    CREATININE 0.8 0.7 - 1.2 mg/dL    GFR Non-African American >60 >=60 mL/min/1.73    GFR African American >60     Calcium 9.4 8.6 - 10.2 mg/dL   Serum Drug Screen   Result Value Ref Range    Ethanol Lvl <10 mg/dL    Acetaminophen Level <5.0 (L) 10.0 - 90.5 mcg/mL    Salicylate, Serum <3.0 0.0 - 30.0 mg/dL    TCA Scrn NEGATIVE Cutoff:300 ng/mL   Urine Drug Screen   Result Value Ref Range    Amphetamine Screen, Urine NOT DETECTED Negative <1000 ng/mL    Barbiturate Screen, Ur NOT DETECTED Negative < 200 ng/mL    Benzodiazepine Screen, Urine NOT DETECTED Negative < 200 ng/mL    Cannabinoid Scrn, Ur NOT DETECTED Negative < 50ng/mL    Cocaine Metabolite Screen, Urine NOT DETECTED Negative < 300 ng/mL    Opiate Scrn, Ur NOT DETECTED Negative < 300ng/mL    PCP Screen, Urine NOT DETECTED Negative < 25 ng/mL    Methadone Screen, Urine NOT DETECTED Negative <300 ng/mL    Oxycodone Urine NOT DETECTED Negative <100 ng/mL    FENTANYL SCREEN, URINE NOT DETECTED Negative <1 ng/mL    Drug Screen Comment: see below    Osmolality, Serum   Result Value Ref Range    Osmolality 309 285 - 310 mOsm/Kg   Troponin   Result Value Ref Range    Troponin, High Sensitivity 15 (H) 0 - 11 ng/L   Urine Drug Screen   Result Value Ref Range    Amphetamine Screen, Urine NOT DETECTED Negative <1000 ng/mL    Barbiturate Screen, Ur NOT DETECTED Negative < 200 ng/mL    Benzodiazepine Screen, Urine NOT DETECTED Negative < 200 ng/mL    Cannabinoid Scrn, Ur NOT DETECTED Negative < 50ng/mL    Cocaine Metabolite Screen, Urine NOT DETECTED Negative < 300 ng/mL    Opiate Scrn, Ur NOT DETECTED Negative < 300ng/mL    PCP Screen, Urine NOT DETECTED Negative < 25 ng/mL    Methadone Screen, Urine NOT DETECTED Negative <300 ng/mL    Oxycodone Urine NOT DETECTED Negative <100 ng/mL    FENTANYL SCREEN, URINE NOT DETECTED Negative <1 ng/mL    Drug Screen Comment: see below    CK   Result Value Ref Range    Total  20 - 200 U/L   Basic Metabolic Panel   Result Value Ref Range    Sodium 132 132 - 146 mmol/L    Potassium 4.5 3.5 - 5.0 mmol/L    Chloride 100 98 - 107 mmol/L    CO2 6 (LL) 22 - 29 mmol/L    Anion Gap 26 (H) 7 - 16 mmol/L    Glucose 242 (H) 74 - 99 mg/dL    BUN 16 6 - 20 mg/dL    CREATININE 0.6 (L) 0.7 - 1.2 mg/dL    GFR Non-African American >60 >=60 mL/min/1.73    GFR African American >60     Calcium 8.1 (L) 8.6 - 10.2 mg/dL   PH, VENOUS   Result Value Ref Range    pH, Phillip 7.14 (LL) 7.35 - 7.45   Beta-Hydroxybutyrate   Result Value Ref Range    Beta-Hydroxybutyrate >4.50 (H) 0.02 - 0.27 mmol/L   Protime-INR   Result Value Ref Range    Protime 13.1 (H) 9.3 - 12.4 sec    INR 1.2    CBC auto differential   Result Value Ref Range    WBC 14.8 (H) 4.5 - 11.5 E9/L    RBC 4.74 3.80 - 5.80 E12/L    Hemoglobin 13.6 12.5 - 16.5 g/dL    Hematocrit 41.3 37.0 - 54.0 %    MCV 87.1 80.0 - 99.9 fL    MCH 28.7 26.0 - 35.0 pg    MCHC 32.9 32.0 - 34.5 %    RDW 14.3 11.5 - 15.0 fL    Platelets 284 859 - 163 E9/L    MPV 9.5 7.0 - 12.0 fL    Neutrophils % 84.5 (H) 43.0 - 80.0 %    Immature Granulocytes % 0.7 0.0 - 5.0 %    Lymphocytes % 6.6 (L) 20.0 - 42.0 %    Monocytes % 7.7 2.0 - 12.0 %    Eosinophils % 0.1 0.0 - 6.0 %    Basophils % 0.4 0.0 - 2.0 %    Neutrophils Absolute 12.48 (H) 1.80 - 7.30 E9/L    Immature Granulocytes # 0.10 E9/L    Lymphocytes Absolute 0.97 (L) 1.50 - 4.00 E9/L    Monocytes Absolute 1.13 (H) 0.10 - 0.95 E9/L    Eosinophils Absolute 0.02 (L) 0.05 - 0.50 E9/L    Basophils Absolute 0.06 0.00 - 0.20 Q4/W   Basic Metabolic Panel   Result Value Ref Range    Sodium 130 (L) 132 - 146 mmol/L    Potassium 4.8 3.5 - 5.0 mmol/L    Chloride 98 98 - 107 mmol/L    CO2 9 (LL) 22 - 29 mmol/L    Anion Gap 23 (H) 7 - 16 mmol/L    Glucose 240 (H) 74 - 99 mg/dL    BUN 15 6 - 20 mg/dL    CREATININE 0.6 (L) 0.7 - 1.2 mg/dL    GFR Non-African American >60 >=60 mL/min/1.73    GFR African American >60     Calcium 8.5 (L) 8.6 - 10.2 mg/dL   Basic Metabolic Panel   Result Value Ref Range    Sodium 135 132 - 146 mmol/L    Potassium 3.3 (L) 3.5 - 5.0 mmol/L    Chloride 102 98 - 107 mmol/L    CO2 13 (L) 22 - 29 mmol/L    Anion Gap 20 (H) 7 - 16 mmol/L    Glucose 211 (H) 74 - 99 mg/dL    BUN 14 6 - 20 mg/dL    CREATININE 0.7 0.7 - 1.2 mg/dL    GFR Non-African American >60 >=60 mL/min/1.73    GFR African American >60     Calcium 8.2 (L) 8.6 - 10.2 mg/dL   Magnesium   Result Value Ref Range    Magnesium 2.6 1.6 - 2.6 mg/dL   Magnesium   Result Value Ref Range    Magnesium 2.1 1.6 - 2.6 mg/dL   Phosphorus   Result Value Ref Range    Phosphorus 2.2 (L) 2.5 - 4.5 mg/dL   Phosphorus   Result Value Ref Range    Phosphorus 1.2 (L) 2.5 - 4.5 mg/dL   CBC with Auto Differential   Result Value Ref Range    WBC 15.2 (H) 4.5 - 11.5 E9/L    RBC 4.58 3.80 - 5.80 E12/L    Hemoglobin 13.0 12.5 - 16.5 g/dL    Hematocrit 40.3 37.0 - 54.0 %    MCV 88.0 80.0 - 99.9 fL    MCH 28.4 26.0 - 35.0 pg    MCHC 32.3 32.0 - 34.5 %    RDW 14.2 11.5 - 15.0 fL    Platelets 074 970 - 627 E9/L    MPV 9.6 7.0 - 12.0 fL    Neutrophils % 84.3 (H) 43.0 - 80.0 %    Immature Granulocytes % 0.9 0.0 - 5.0 %    Lymphocytes % 5.8 (L) 20.0 - 42.0 %    Monocytes % 7.9 2.0 - 12.0 %    Eosinophils % 0.7 0.0 - 6.0 %    Basophils % 0.4 0.0 - 2.0 %    Neutrophils Absolute 12.79 (H) 1.80 - 7.30 E9/L    Immature Granulocytes # 0.14 E9/L    Lymphocytes Absolute 0.88 (L) 1.50 - 4.00 E9/L    Monocytes Absolute 1.20 (H) 0.10 - 0.95 E9/L    Eosinophils Absolute 0.10 0.05 - 0.50 E9/L    Basophils Absolute 0.06 0.00 - 0.20 E9/L   Lipase   Result Value Ref Range    Lipase 34 13 - 60 U/L   Procalcitonin   Result Value Ref Range    Procalcitonin 0.13 (H) 0.00 - 0.08 ng/mL   C-Reactive Protein   Result Value Ref Range    CRP 16. 7 (H) 0.0 - 0.4 mg/dL   POCT Glucose   Result Value Ref Range    Meter Glucose 213 (H) 74 - 99 mg/dL   POCT blood gases   Result Value Ref Range    POC Source Arterial     PH 37 7.158 (LL) 7.350 - 7.450    PCO2 37 14.2 (LL) 35.0 - 45.0 mmHg    PO2 37 106.1 (H) 80.0 - 100.0 mmHg    HCO3 5.0 (L) 22.0 - 26.0 mmol/L    B.E. -20.5 (L) -3.0 - 3.0 mmol/L    O2 Sat 96.5 92.0 - 98.5 %    Cardiopulmonary Bypass No      ID [id number]     DEVICE [id number]     Critical Notification Yes     [employer]    POCT Glucose   Result Value Ref Range    Meter Glucose 263 (H) 74 - 99 mg/dL   POCT Glucose   Result Value Ref Range    Meter Glucose 225 (H) 74 - 99 mg/dL   POCT Glucose   Result Value Ref Range    Meter Glucose 211 (H) 74 - 99 mg/dL   POCT Glucose   Result Value Ref Range    Meter Glucose 182 (H) 74 - 99 mg/dL   POCT Glucose   Result Value Ref Range    Meter Glucose 165 (H) 74 - 99 mg/dL   POCT Glucose   Result Value Ref Range    Meter Glucose 203 (H) 74 - 99 mg/dL   POCT Glucose   Result Value Ref Range    Meter Glucose 233 (H) 74 - 99 mg/dL   POCT Glucose   Result Value Ref Range    Meter Glucose 207 (H) 74 - 99 mg/dL   POCT Glucose   Result Value Ref Range    Meter Glucose 182 (H) 74 - 99 mg/dL   EKG 12 Lead   Result Value Ref Range    Ventricular Rate 106 BPM    Atrial Rate 107 BPM    P-R Interval 206 ms    QRS Duration 70 ms    Q-T Interval 356 ms    QTc Calculation (Bazett) 472 ms    P Axis 58 degrees    R Axis 4 degrees    T Axis 33 degrees       RADIOLOGY:  Interpreted by Radiologist.  CT ABDOMEN PELVIS W IV CONTRAST Additional Contrast? None   Final Result   1. There has been reaccumulation of fluid in the right lower quadrant   extraperitoneal collection since prior examination with interval removal of   percutaneous drainage catheter. 2. Colonic diverticulosis without evidence of diverticulitis. CT CHEST WO CONTRAST   Final Result   1.   Mildly prominent pulmonary artery, consider pulmonary artery hypertension. 2.  Wall thickening of the mid and distal esophagus, consider esophagitis. 3.  Lungs are clear. XR CHEST PORTABLE   Final Result   No acute process. IR INTERVENTIONAL RADIOLOGY PROCEDURE REQUEST    (Results Pending)         EKG Interpretation  Time: 09:22 AM EST  Rhythm: sinus tachycardia  Rate: 106  Axis: normal  Conduction: normal  ST Segments: no acute change  T Waves: no acute change  Clinical Impression: sinus tachycardia and low voltage QRS  Comparison to prior EKG: changes compared to previous EKG (new low voltage)      ------------------------- NURSING NOTES AND VITALS REVIEWED ---------------------------  The nursing notes within the ED encounter and vital signs as below have been reviewed by myself. BP (!) 82/53   Pulse 85   Temp (!) 96.7 °F (35.9 °C) (Temporal)   Resp 17   Ht 6' 2\" (1.88 m)   Wt 269 lb 1.6 oz (122.1 kg)   SpO2 97%   BMI 34.55 kg/m²   Oxygen Saturation Interpretation: Normal      The patients available past medical records and past encounters were reviewed.         ------------------------------ ED COURSE/MEDICAL DECISION MAKING----------------------  Medications   piperacillin-tazobactam (ZOSYN) 3,375 mg in dextrose 5 % 100 mL IVPB extended infusion (mini-bag) (has no administration in time range)   sodium chloride flush 0.9 % injection 10 mL (has no administration in time range)   sodium chloride flush 0.9 % injection 10 mL (has no administration in time range)   0.9 % sodium chloride infusion (has no administration in time range)   ondansetron (ZOFRAN-ODT) disintegrating tablet 4 mg (has no administration in time range)     Or   ondansetron (ZOFRAN) injection 4 mg (has no administration in time range)   enoxaparin Sodium (LOVENOX) injection 30 mg (30 mg SubCUTAneous Given 5/26/22 0022)   glucose chewable tablet 16 g (has no administration in time range)   glucagon (rDNA) injection 1 mg (has no administration in time range)   atorvastatin (LIPITOR) tablet 40 mg (40 mg Oral Given 5/26/22 0023)   lisinopril (PRINIVIL;ZESTRIL) tablet 5 mg (5 mg Oral Not Given 5/26/22 0032)   insulin regular (HUMULIN R;NOVOLIN R) 100 Units in sodium chloride 0.9 % 100 mL infusion (4.88 Units/hr IntraVENous Rate/Dose Change 5/26/22 0600)   dextrose bolus 10% 125 mL (has no administration in time range)     Or   dextrose bolus 10% 250 mL (has no administration in time range)   potassium chloride 10 mEq/100 mL IVPB (Peripheral Line) (has no administration in time range)   magnesium sulfate 1000 mg in dextrose 5% 100 mL IVPB (has no administration in time range)   sodium phosphate 10 mmol in sodium chloride 0.9 % 250 mL IVPB (has no administration in time range)     Or   sodium phosphate 15 mmol in dextrose 5 % 250 mL IVPB (has no administration in time range)     Or   sodium phosphate 20 mmol in dextrose 5 % 500 mL IVPB (has no administration in time range)   dextrose 5 % and 0.45 % sodium chloride infusion ( IntraVENous New Bag 5/26/22 0358)   0.9 % sodium chloride infusion (has no administration in time range)   sodium bicarbonate 150 mEq in sterile water 1,000 mL infusion ( IntraVENous New Bag 5/26/22 0983)   sodium chloride flush 0.9 % injection 5-40 mL (has no administration in time range)   sodium chloride flush 0.9 % injection 5-40 mL (has no administration in time range)   0.9 % sodium chloride infusion (has no administration in time range)   polyethylene glycol (GLYCOLAX) packet 17 g (has no administration in time range)   acetaminophen (TYLENOL) tablet 650 mg (has no administration in time range)     Or   acetaminophen (TYLENOL) suppository 650 mg (has no administration in time range)   ondansetron (ZOFRAN) injection 4 mg (4 mg IntraVENous Given 5/25/22 0946)   0.9 % sodium chloride bolus (0 mLs IntraVENous Stopped 5/25/22 1111)   0.9 % sodium chloride bolus (0 mLs IntraVENous Stopped 5/25/22 1520)   sodium bicarbonate 8.4 % injection 50 mEq (50 mEq IntraVENous Given 5/25/22 1031)   sodium bicarbonate 8.4 % injection 50 mEq (50 mEq IntraVENous Given 5/25/22 1109)   sodium bicarbonate 8.4 % injection 50 mEq (50 mEq IntraVENous Given 5/25/22 1124)   0.9 % sodium chloride bolus (0 mLs IntraVENous Stopped 5/25/22 1235)   iopamidol (ISOVUE-370) 76 % injection 75 mL (75 mLs IntraVENous Given 5/25/22 1155)   piperacillin-tazobactam (ZOSYN) 3,375 mg in dextrose 5 % 100 mL IVPB (mini-bag) (0 mg IntraVENous Stopped 5/25/22 1520)   vancomycin (VANCOCIN) 2,000 mg in dextrose 5 % 500 mL IVPB (0 mg/kg × 132 kg IntraVENous Stopped 5/25/22 1906)           Procedures:   none      Medical Decision Making:    10:78 AM EDT  Metabolic panel resulting with high anion gap (28), CO2 8, gluose 225, soidum 131 and Cl 95 at 10:00am.  1 Liter fluid already given. Starting 2nd liter fluid and ordered 1am bicarb with bicarb drip with repeat BMP pending after 1st liter fluid now. Patient not on metformin, Patient denies alcohol or drug use or injection. Will add serum drug screen (for ethanol and salicylates), CT abdomen and serum osm. Lactic acid is 2.0 and Cre 0.8 at initial workup. 10:56 2nd IV established as the first IV infiltrated around 10:30 per RN. 2nd amp bicarb ordered and ABg resulted at 10:50 pH 7.158, pCO2 14.2 and HCO3 5 and still awating bicarb drip being courriered from [facility]. Will stat transfer to [facility] ER   1:15 PM EDT  On 4th liter of fluid and states he feels better. Not vomiting, asking to eat. Will remain NPO for now. Repeat metabolic panel not improved. Biotics starting with Zosyn and Vanco for now given recent procedure noted and reaccumulation of intra-abdominal abscess post perforated appendix to months ago. Given profound metabolic acidosis and the potential for becoming significantly unstable and for decline will send to [facility] ER immediately for urgent surgical consultation and SICU admission versus to IR drainage versus OR.   Both general surgery and [facility] ER physician agrees with plan. Both patient and spouse agree with the plan as well. He is stable for transport and hemodynamically stable at this time but they understand that he could become unstable at any time and he is critical and his condition is life-threatening and could suffer death and/or disability at any time. All of their questions were answered to their satisfaction. Repeat BMP and labs pending upon time of transfer to [facility] ER. This patient's ED course included: multiple bedside re-evaluations, IV medications, cardiac monitoring, continuous pulse oximetry and a personal history and physicial eaxmination    This patient has remained hemodynamically stable, improved and been closely monitored during their ED course. Re-Evaluations:  Time: 1:17 PM EDT   Re-evaluation. Patients symptoms are improving  Repeat physical examination is not changed      Consultations:   13:07 PM EDT  Spoke with Dr Mackenzie Ocampo, general surgery, discussed case, accepts transfer to [facility] ER for emergency surgery consultation  1:18 PM EDT  Spoke with Dr Alison Lawrence, ER physician, discussed case, accepts transfer to [facility] ER    Critical Care: Please note that the withdrawal or failure to initiate urgent interventions for this patient would likely result in a life threatening deterioration or permanent disability. Accordingly this patient received 45 minutes of critical care time, excluding separately billable procedures. Kana Linda, , am the Primary Provider of Record    Counseling: The emergency provider has spoken with the patient and spouse/SO and discussed todays results, in addition to providing specific details for the plan of care and counseling regarding the diagnosis and prognosis.   Questions are answered at this time and they are agreeable with the plan.    --------------------------- IMPRESSION AND DISPOSITION ---------------------------------    IMPRESSION  1. AMANDA type 2, not at goal [facility])    2.  Intra-abdominal abscess ([facility] [address]        DISPOSITION  Disposition: Transfer to [facility] to ER  Patient condition is serious             Jose Guzmán DO  05/26/22 6821

## 2022-05-25 NOTE — ED PROVIDER NOTES
[facility]  Department of Emergency Medicine     Written by: Mikaela Odell DO  Patient Name: Bianca Colon  Attending Provider: Antony Gunn DO  Admit Date: 2022  8:19 AM  MRN: [id number]                   : 1965        Chief Complaint   Patient presents with    Fatigue     feels dehydrated, diarrhea on Saturday. Emesis last 2 days. - Chief complaint    Mr. Eddie Power is a 65 yo male who presents to the ED due to fatigue. Patient states that he has had nausea with vomiting and generalized fatigue since Saturday. States that he has had decreased appetite and generalized malaise. States that he had been doing better since finishing antibiotics for ruptured appendix with an abscess several months ago. He denies any abdominal pain associated with his symptoms. States his symptoms have been constant since onset and mildly worsening. Denies any aggravating relieving factors. Denies any fevers, chills, chest pain, shortness of breath, abdominal pain, bowel or urinary changes, headaches or vision changes. Review of Systems   Constitutional: Positive for fatigue and fever (Two days ago). Negative for chills. HENT: Negative for congestion, sinus pressure, sinus pain and sore throat. Eyes: Negative for pain, redness and visual disturbance. Respiratory: Negative for cough, chest tightness and shortness of breath. Cardiovascular: Negative for chest pain, palpitations and leg swelling. Gastrointestinal: Positive for diarrhea, nausea and vomiting. Negative for abdominal pain and constipation. Genitourinary: Negative for dysuria, flank pain, frequency, hematuria and urgency. Musculoskeletal: Negative for arthralgias, back pain, gait problem, joint swelling and myalgias. Skin: Negative for rash. Neurological: Negative for dizziness, tremors, syncope, weakness, light-headedness, numbness and headaches.         Physical Exam  Constitutional: General: He is not in acute distress. Appearance: Normal appearance. He is obese. He is not ill-appearing or toxic-appearing. HENT:      Head: Normocephalic and atraumatic. Right Ear: External ear normal.      Left Ear: External ear normal.      Mouth/Throat:      Mouth: Mucous membranes are moist.      Pharynx: Oropharynx is clear. Eyes:      Extraocular Movements: Extraocular movements intact. Conjunctiva/sclera: Conjunctivae normal.   Cardiovascular:      Rate and Rhythm: Normal rate and regular rhythm. Pulses: Normal pulses. Heart sounds: Normal heart sounds. Pulmonary:      Effort: Pulmonary effort is normal. No respiratory distress. Breath sounds: Normal breath sounds. No wheezing. Abdominal:      General: Abdomen is flat. Bowel sounds are normal.      Palpations: Abdomen is soft. Tenderness: There is no abdominal tenderness. There is no guarding or rebound. Musculoskeletal:         General: No swelling, tenderness or deformity. Normal range of motion. Cervical back: Normal range of motion and neck supple. No rigidity or tenderness. Skin:     General: Skin is warm and dry. Neurological:      General: No focal deficit present. Mental Status: He is alert and oriented to person, place, and time. Mental status is at baseline. Cranial Nerves: No cranial nerve deficit. Sensory: No sensory deficit. Motor: No weakness. Psychiatric:         Mood and Affect: Mood normal.         Behavior: Behavior normal.          Procedures       MDM  Number of Diagnoses or Management Options  DKA, type 2, not at goal [facility])  Intra-abdominal abscess [facility])  Diagnosis management comments: This is a 63 yo male who presents to the ED due to fatigue. Patient was transferred [facility] from [facility] for further evaluation of his intra-abdominal abscess. Prior to arrival he had received Zofran, bicarb, and multiple normal saline boluses.   He was also started on Zosyn at this time. Upon arrival he was also given vancomycin in the emergency department. Patient's initial lab work was significant for leukocytosis of 93.9 and a metabolic acidosis with a bicarb of 8 and an anion gap of 28. Lactic acid was 2.0. Initial troponin was 18 with a repeat of 15 and a delta of 3 and thus negative. ABG revealed metabolic acidosis with a pH of 7.158 and a PCO2 of 14.2. Urine drug screen was negative. Urinalysis was negative for any urinary tract infection. Patient CT abdomen pelvis revealed reaccumulation of fluid in the right lower quadrant extraperitoneal which has increased since prior examination. CT chest revealed mildly prominent pulmonary artery as well as wall thickening of the mild and distal esophagus and no acute process on chest x-ray. Patient was initially admitted to surgery with plan for an IR drain placement but beta hydroxybutyrate was obtained and was greater than 4.5 and repeat BMP revealed worsening metabolic acidosis. Patient was started on DKA protocol at this time will be admitted to the ICU by Dr. Lalitha Sarmiento for further work-up and treatment where he has been accepted by Dr. Zach Olivas.              --------------------------------------------- PAST HISTORY ---------------------------------------------  Past Medical History:  has a past medical history of Appendicitis with abscess, [facility] Gangrene of toe of left foot [facility] Hyperlipidemia, Hyperlipidemia, Sleep apnea, and Vitamin D deficiency. Past Surgical History:  has a past surgical history that includes other surgical history (Left, 11/17/2017); Toe amputation; and Toe amputation (Left, 10/21/2019). Social History:  reports that he has never smoked. He has never used smokeless tobacco. He reports that he does not drink alcohol and does not use drugs.     Family History: family history includes Atrial Fibrillation in his mother; Cancer in his father and mother; Depression in his father, maternal grandmother, and mother. The patients home medications have been reviewed. Allergies: Patient has no known allergies.     -------------------------------------------------- RESULTS -------------------------------------------------    LABS:  Results for orders placed or performed during the hospital encounter of 05/25/22   COVID-19 & Influenza Combo    Specimen: Nasopharyngeal Swab   Result Value Ref Range    SARS-CoV-2 RNA, RT PCR NOT DETECTED NOT DETECTED    INFLUENZA A NOT DETECTED NOT DETECTED    INFLUENZA B NOT DETECTED NOT DETECTED   CBC with Auto Differential   Result Value Ref Range    WBC 20.6 (H) 4.5 - 11.5 E9/L    RBC 5.35 3.80 - 5.80 E12/L    Hemoglobin 15.6 12.5 - 16.5 g/dL    Hematocrit 46.7 37.0 - 54.0 %    MCV 87.3 80.0 - 99.9 fL    MCH 29.2 26.0 - 35.0 pg    MCHC 33.4 32.0 - 34.5 %    RDW 14.2 11.5 - 15.0 fL    Platelets 499 715 - 401 E9/L    MPV 9.7 7.0 - 12.0 fL    Neutrophils % 80.7 (H) 43.0 - 80.0 %    Immature Granulocytes % 1.5 0.0 - 5.0 %    Lymphocytes % 8.6 (L) 20.0 - 42.0 %    Monocytes % 8.8 2.0 - 12.0 %    Eosinophils % 0.0 0.0 - 6.0 %    Basophils % 0.4 0.0 - 2.0 %    Neutrophils Absolute 16.66 (H) 1.80 - 7.30 E9/L    Immature Granulocytes # 0.30 E9/L    Lymphocytes Absolute 1.78 1.50 - 4.00 E9/L    Monocytes Absolute 1.82 (H) 0.10 - 0.95 E9/L    Eosinophils Absolute 0.00 (L) 0.05 - 0.50 E9/L    Basophils Absolute 0.08 0.00 - 0.20 E9/L    RBC Morphology Normal    Comprehensive metabolic panel   Result Value Ref Range    Sodium 131 (L) 132 - 146 mmol/L    Potassium 4.3 3.5 - 5.0 mmol/L    Chloride 95 (L) 98 - 107 mmol/L    CO2 8 (LL) 22 - 29 mmol/L    Anion Gap 28 (H) 7 - 16 mmol/L    Glucose 225 (H) 74 - 99 mg/dL    BUN 17 6 - 20 mg/dL    CREATININE 0.8 0.7 - 1.2 mg/dL    GFR Non-African American >60 >=60 mL/min/1.73    GFR African American >60     Calcium 9.5 8.6 - 10.2 mg/dL    Total Protein 8.8 (H) 6.4 - 8.3 g/dL    Albumin 3.6 3.5 - 5.2 g/dL Total Bilirubin 0.3 0.0 - 1.2 mg/dL    Alkaline Phosphatase 143 (H) 40 - 129 U/L    ALT 18 0 - 40 U/L    AST 17 0 - 39 U/L   Troponin   Result Value Ref Range    Troponin, High Sensitivity 18 (H) 0 - 11 ng/L   Lactic acid, plasma   Result Value Ref Range    Lactic Acid 2.0 0.5 - 2.2 mmol/L   Urinalysis with Microscopic   Result Value Ref Range    Color, UA Yellow Straw/Yellow    Clarity, UA Clear Clear    Glucose, Ur 500 (A) Negative mg/dL    Bilirubin Urine SMALL (A) Negative    Ketones, Urine >=80 (A) Negative mg/dL    Specific Gravity, UA >=1.030 1.005 - 1.030    Blood, Urine MODERATE (A) Negative    pH, UA 5.5 5.0 - 9.0    Protein, UA 30 (A) Negative mg/dL    Urobilinogen, Urine 0.2 <2.0 E.U./dL    Nitrite, Urine Negative Negative    Leukocyte Esterase, Urine Negative Negative    WBC, UA 0-1 0 - 5 /HPF    RBC, UA 1-3 0 - 2 /HPF    Bacteria, UA RARE (A) None Seen /HPF   Magnesium   Result Value Ref Range    Magnesium 2.4 1.6 - 2.6 mg/dL   Basic Metabolic Panel   Result Value Ref Range    Sodium 131 (L) 132 - 146 mmol/L    Potassium 4.7 3.5 - 5.0 mmol/L    Chloride 94 (L) 98 - 107 mmol/L    CO2 8 (LL) 22 - 29 mmol/L    Anion Gap 29 (H) 7 - 16 mmol/L    Glucose 226 (H) 74 - 99 mg/dL    BUN 17 6 - 20 mg/dL    CREATININE 0.8 0.7 - 1.2 mg/dL    GFR Non-African American >60 >=60 mL/min/1.73    GFR African American >60     Calcium 9.4 8.6 - 10.2 mg/dL   Serum Drug Screen   Result Value Ref Range    Ethanol Lvl <10 mg/dL    Acetaminophen Level <5.0 (L) 10.0 - 33.0 mcg/mL    Salicylate, Serum <4.2 0.0 - 30.0 mg/dL    TCA Scrn NEGATIVE Cutoff:300 ng/mL   Urine Drug Screen   Result Value Ref Range    Amphetamine Screen, Urine NOT DETECTED Negative <1000 ng/mL    Barbiturate Screen, Ur NOT DETECTED Negative < 200 ng/mL    Benzodiazepine Screen, Urine NOT DETECTED Negative < 200 ng/mL    Cannabinoid Scrn, Ur NOT DETECTED Negative < 50ng/mL    Cocaine Metabolite Screen, Urine NOT DETECTED Negative < 300 ng/mL    Opiate Scrn, Ur NOT DETECTED Negative < 300ng/mL    PCP Screen, Urine NOT DETECTED Negative < 25 ng/mL    Methadone Screen, Urine NOT DETECTED Negative <300 ng/mL    Oxycodone Urine NOT DETECTED Negative <100 ng/mL    FENTANYL SCREEN, URINE NOT DETECTED Negative <1 ng/mL    Drug Screen Comment: see below    Osmolality, Serum   Result Value Ref Range    Osmolality 309 285 - 310 mOsm/Kg   Troponin   Result Value Ref Range    Troponin, High Sensitivity 15 (H) 0 - 11 ng/L   CK   Result Value Ref Range    Total  20 - 200 U/L   Basic Metabolic Panel   Result Value Ref Range    Sodium 132 132 - 146 mmol/L    Potassium 4.5 3.5 - 5.0 mmol/L    Chloride 100 98 - 107 mmol/L    CO2 6 (LL) 22 - 29 mmol/L    Anion Gap 26 (H) 7 - 16 mmol/L    Glucose 242 (H) 74 - 99 mg/dL    BUN 16 6 - 20 mg/dL    CREATININE 0.6 (L) 0.7 - 1.2 mg/dL    GFR Non-African American >60 >=60 mL/min/1.73    GFR African American >60     Calcium 8.1 (L) 8.6 - 10.2 mg/dL   Beta-Hydroxybutyrate   Result Value Ref Range    Beta-Hydroxybutyrate >4.50 (H) 0.02 - 0.27 mmol/L   POCT Glucose   Result Value Ref Range    Meter Glucose 213 (H) 74 - 99 mg/dL   POCT blood gases   Result Value Ref Range    POC Source Arterial     PH 37 7.158 (LL) 7.350 - 7.450    PCO2 37 14.2 (LL) 35.0 - 45.0 mmHg    PO2 37 106.1 (H) 80.0 - 100.0 mmHg    HCO3 5.0 (L) 22.0 - 26.0 mmol/L    B.E. -20.5 (L) -3.0 - 3.0 mmol/L    O2 Sat 96.5 92.0 - 98.5 %    Cardiopulmonary Bypass No      ID [id number]     DEVICE [id number]     Critical Notification Yes     Delivery Systems [employer]    EKG 12 Lead   Result Value Ref Range    Ventricular Rate 106 BPM    Atrial Rate 107 BPM    P-R Interval 206 ms    QRS Duration 70 ms    Q-T Interval 356 ms    QTc Calculation (Bazett) 472 ms    P Axis 58 degrees    R Axis 4 degrees    T Axis 33 degrees       RADIOLOGY:  CT ABDOMEN PELVIS W IV CONTRAST Additional Contrast? None   Final Result   1.  There has been reaccumulation of fluid in the right lower quadrant   extraperitoneal collection since prior examination with interval removal of   percutaneous drainage catheter. 2. Colonic diverticulosis without evidence of diverticulitis. CT CHEST WO CONTRAST   Final Result   1. Mildly prominent pulmonary artery, consider pulmonary artery hypertension. 2.  Wall thickening of the mid and distal esophagus, consider esophagitis. 3.  Lungs are clear. XR CHEST PORTABLE   Final Result   No acute process. IR INTERVENTIONAL RADIOLOGY PROCEDURE REQUEST    (Results Pending)       EKG: This EKG is signed and interpreted by me. Rate: 106  Rhythm: Sinus  Interpretation: sinus tachycardia  Comparison: was normal and stable as compared to patient's most recent EKG      ------------------------- NURSING NOTES AND VITALS REVIEWED ---------------------------  Date / Time Roomed:  5/25/2022  8:19 AM  ED Bed Assignment:  19/19    The nursing notes within the ED encounter and vital signs as below have been reviewed. Patient Vitals for the past 24 hrs:   BP Temp Temp src Pulse Resp SpO2 Height Weight   05/25/22 1452 124/74 -- -- (!) 106 18 99 % -- --   05/25/22 1346 133/78 -- -- 99 20 99 % -- --   05/25/22 1106 124/77 -- -- (!) 101 28 100 % -- --   05/25/22 0833 (!) 122/90 97.2 °F (36.2 °C) Temporal (!) 104 24 99 % 6' 2\" (1.88 m) 291 lb (132 kg)       Oxygen Saturation Interpretation: Normal    ------------------------------------------ PROGRESS NOTES ------------------------------------------  Re-evaluation(s):  Time: 9235  Patients symptoms show no change  Repeat physical examination is not changed    Counseling:  I have spoken with the patient and discussed todays results, in addition to providing specific details for the plan of care and counseling regarding the diagnosis and prognosis.   Their questions are answered at this time and they are agreeable with the plan of admission.    --------------------------------- ADDITIONAL PROVIDER

## 2022-05-25 NOTE — DISCHARGE INSTR - COC
Continuity of Care Form    Patient Name: Shayy Onofre   :  1965  MRN:  [id number]    Admit date:  2022  Discharge date:  ***    Code Status Order: Prior   Advance Directives:      Admitting Physician:  No admitting provider for patient encounter. PCP: Gallo Kimball MD    Discharging Nurse: [facility] Unit/Room#:   Discharging Unit Phone Number: ***    Emergency Contact:   Extended Emergency Contact Information  Primary Emergency Contact: Karen Pablo  Address: [address], [address] Phone: [phone]  Mobile Phone: [phone]  Relation: Spouse   needed? No    Past Surgical History:  Past Surgical History:   Procedure Laterality Date    OTHER SURGICAL HISTORY Left 2017    Amputation of left great toe.     TOE AMPUTATION      TOE AMPUTATION Left 10/21/2019    LEFT SECOND TOE AMPUTATION performed by Jayleen Goldman DPM at [address]       Immunization History:   Immunization History   Administered Date(s) Administered    COVID-19, [place] Vaccine, Vaccine Unspecified 2021, 2021    Influenza Virus Vaccine 10/05/2015, 10/03/2019    Influenza, MDCK Quadv, IM, PF (Flucelvax 2 yrs and older) 2021    Influenza, MDCK Quadv, with preserv IM (Flucelvax 2 yrs and older) 10/04/2019    Influenza, Quadv, IM, PF (6 mo and older Fluzone, Flulaval, Fluarix, and 3 yrs and older Afluria) 2017, 10/07/2018    Pneumococcal Polysaccharide ([id number]) 10/05/2015       Active Problems:  Patient Active Problem List   Diagnosis Code    Diabetic foot ulcer ([address] E11.621, L97.509    Diabetes mellitus ([address] E11.9    Osteomyelitis of ankle or foot, left, acute ([address] M86.172    Cellulitis of foot L03.119    Diabetic arthropathy ([address] E11.618    Gangrene of toe of left foot ([address] I96    Morbid (severe) obesity due to excess calories (HCC) E66.01    Pure hypertriglyceridemia E78.1    Diabetic foot infection ([facility] E11.628, L08.9    DVT prophylaxis Z29.9    ISAIAS (acute kidney injury) ([facility] N17.9    Intra-abdominal abscess ([facility] K65.1    Vitamin D deficiency E55.9    PINEDA (obstructive sleep apnea) G47.33       Isolation/Infection:   Isolation            No Isolation          Patient Infection Status       Infection Onset Added Last Indicated Last Indicated By Review Planned Expiration Resolved Resolved By    C-diff Rule Out 05/25/22 05/25/22 05/25/22 Clostridium difficile, EIA (Ordered)        COVID-19 (Rule Out) 05/25/22 05/25/22 05/25/22 COVID-19 & Influenza Combo (Ordered) 06/01/22 06/08/22      Resolved    MRSA 10/19/19 10/22/19 10/19/19 WOUND CULTURE   10/22/19 Frieda Pino RN            Nurse Assessment:  Last Vital Signs: BP (!) 122/90   Pulse (!) 104   Temp 97.2 °F (36.2 °C) (Temporal)   Resp 24   Wt 291 lb (132 kg)   SpO2 99%   BMI 37.36 kg/m²     Last documented pain score (0-10 scale):    Last Weight:   Wt Readings from Last 1 Encounters:   05/25/22 291 lb (132 kg)     Mental Status:  {IP PT MENTAL STATUS:20030}    IV Access:  { TAMERA IV ACCESS:918427455}    Nursing Mobility/ADLs:  Walking   {[facility] DME GHQK:564754434}  Transfer  {[facility] DME CHUZ:957403867}  Bathing  {[facility] DME LFSW:759652997}  Dressing  {[facility] DME TDDJ:838878252}  Toileting  {[facility] DME JSGU:493146769}  Feeding  {[facility] DME QXBN:309989089}  Med Admin  {[facility] DME RCQE:331015050}  Med Delivery   { TAMERA MED Delivery:594872801}    Wound Care Documentation and Therapy:  Wound 10/18/19 Toe (Comment  which one) Anterior; Left (Active)   Number of days: 949       Incision 10/21/19 Toe (Comment  which one) Anterior; Left (Active)   Number of days: 946        Elimination:  Continence:    Bowel: {YES / XY:65245}  Bladder: {YES / XX:60851}  Urinary Catheter: {Urinary Catheter:445491517}   Colostomy/Ileostomy/Ileal Conduit: {YES / CO:28802}       Date of Last BM: ***  No intake or output data in the 24 hours ending 05/25/22 1008  No intake/output data recorded.     Safety Concerns:     [address] [facility] Safety Concerns:527112785}    Impairments/Disabilities:      [address] [facility] Impairments/Disabilities:775906545}    Nutrition Therapy:  Current Nutrition Therapy:   [address] [facility] Diet List:001538407}    Routes of Feeding: {CHP DME Other Feedings:802272177}  Liquids: {Slp liquid thickness:35128}  Daily Fluid Restriction: {CHP DME Yes amt example:560301770}  Last Modified Barium Swallow with Video (Video Swallowing Test): {Done Not Done DCEX:729437490}    Treatments at the Time of Hospital Discharge:   Respiratory Treatments: ***  Oxygen Therapy:  {Therapy; copd oxygen:21255}  Ventilator:    { CC Vent GHXP:782533537}    Rehab Therapies: {THERAPEUTIC INTERVENTION:3056867522}  Weight Bearing Status/Restrictions: [address]  Weight Bearin}  Other Medical Equipment (for information only, NOT a DME order):  {EQUIPMENT:843850595}  Other Treatments: ***    Patient's personal belongings (please select all that are sent with patient):  {P DME Belongings:081808938}    RN SIGNATURE:  {Esignature:253865919}    CASE MANAGEMENT/SOCIAL WORK SECTION    Inpatient Status Date: ***    Readmission Risk Assessment Score:  Readmission Risk              Risk of Unplanned Readmission:  0           Discharging to Facility/ Agency   Name:   Address:  Phone:  Fax:    Dialysis Facility (if applicable)   Name:  Address:  Dialysis Schedule:  Phone:  Fax:    / signature: {Esignature:979398156}    PHYSICIAN SECTION    Prognosis: {Prognosis:5942843625}    Condition at Discharge: [address] Patient Condition:175263955}    Rehab Potential (if transferring to Rehab): {Prognosis:2722791452}    Recommended Labs or Other Treatments After Discharge: ***    Physician Certification: I certify the above information and transfer of [employer]  is necessary for the continuing treatment of the diagnosis listed and that he requires {Admit to Appropriate Level of Care:96541} for {GREATER/LESS:016423996} 30 days.      Update Admission H&P: {CHP DME Changes in RNCEQ:001519660}    PHYSICIAN SIGNATURE:  {Esignature:149274894}

## 2022-05-25 NOTE — H&P
General Surgery    History and Physical    Patient's Name/Date of Birth: Shayy Onofre /1965 (27 y.o.)    Date: May 25, 2022     CC: intra-abdominal abscess     HPI:  42-year-old male presenting for evaluation of fatigue and nausea. Patient has a significant past medical history of diabetes and associated morbidities, and previous intra-abdominal abscess secondary to perforated appendicitis s/p IR drainage. Patient was feeling fatigued and developed nausea last 24 hours and went [facility] to be evaluated. CT imaging revealed return of his intra-abdominal fluid collection in the right lower quadrant. This is compared to his CT scan after drain was removed at the end of March. Patient however, is not complaining of any abdominal pain. Patient states he had 1 episode of diarrhea last Friday but has otherwise had normal bowel movements. Was tolerating a diet up until the last 24 hours. No peritoneal signs on exam.  Mild tachycardia to 104 normotensive. Patient does have a leukocytosis to 20.6. Past Medical History:   Diagnosis Date    Appendicitis with abscess     appendix not removed as of 5/25/22    Diabetes mellitus ([address]     Gangrene of toe of left foot ([address] 11/17/2017    Hyperlipidemia     Hyperlipidemia     Sleep apnea     Vitamin D deficiency        Past Surgical History:   Procedure Laterality Date    OTHER SURGICAL HISTORY Left 11/17/2017    Amputation of left great toe.     TOE AMPUTATION      TOE AMPUTATION Left 10/21/2019    LEFT SECOND TOE AMPUTATION performed by Jayleen Goldamn DPM at [address] Facility-Administered Medications   Medication Dose Route Frequency Provider Last Rate Last Admin    sodium bicarbonate 150 mEq in dextrose 5 % 1,000 mL infusion   IntraVENous Continuous Aurora Schmitt  mL/hr at 05/25/22 1234 New Bag at 05/25/22 1234    0.9 % sodium chloride bolus  1,000 mL IntraVENous Once Aurora Schmitt DO        ondansetron [facility] injection 4 mg  4 mg IntraVENous Once [employer], DO        vancomycin (VANCOCIN) 2,000 mg in dextrose 5 % 500 mL IVPB  15 mg/kg IntraVENous Once Jacquelyn Topete, DO         Current Outpatient Medications   Medication Sig Dispense Refill    lisinopril (PRINIVIL;ZESTRIL) 5 MG tablet Take 1 tablet by mouth daily 90 tablet 0    loratadine (CLARITIN) 10 MG tablet Take 1 tablet by mouth daily 90 tablet 0    empagliflozin (JARDIANCE) 10 MG tablet Take 1 tablet by mouth daily 90 tablet 0    atorvastatin (LIPITOR) 40 MG tablet Take 1 tablet by mouth daily 90 tablet 0    glucose monitoring (FREESTYLE FREEDOM) kit 1 kit by Does not apply route 4 times daily as needed (monitoring blood sugars) 1 kit 0    FreeStyle Lancets MISC 1 each by Does not apply route 4 times daily as needed (monitor blood sugar) 200 each 2    blood glucose test strips (FREESTYLE LITE) strip 1 each by In Vitro route daily As needed. 200 each 2    Blood Glucose Monitoring Suppl (ONE TOUCH ULTRA 2) w/Device KIT 1 kit by Does not apply route 4 times daily as needed (monitoring blood sugar) Check blood sugar 4 times daily and prn. Please provide diabetic supplies for use with this monitor 1 kit 0    blood glucose test strips (ONETOUCH ULTRA) strip 1 each by In Vitro route 4 times daily as needed (monitor blood sugar with insulin use) As needed.  360 each 0    ONE TOUCH ULTRASOFT LANCETS MISC 1 each by Does not apply route 4 times daily as needed (monitor blood sugar with insulin use) 360 each 0    insulin glargine (LANTUS SOLOSTAR) 100 UNIT/ML injection pen Inject 60 Units into the skin nightly 20 pen 0    acetaminophen (TYLENOL) 500 MG tablet Take 2 tablets by mouth every 8 hours as needed for Pain 60 tablet 0    Blood Glucose Monitoring Suppl (D-CARE GLUCOMETER) w/Device KIT 1 each by Does not apply route 4 times daily (with meals and nightly) 1 kit 0    Insulin Pen Needle 32G X 4 MM MISC 1 each by Does not apply route 4 times daily (before meals and nightly) 100 each 0       No Known Allergies    Family History   Problem Relation Age of Onset    Depression Mother     Cancer Mother     Atrial Fibrillation Mother     Depression Father     Cancer Father     Depression Maternal Grandmother        Social History     Socioeconomic History    Marital status:      Spouse name: Not on file    Number of children: Not on file    Years of education: Not on file    Highest education level: Not on file   Occupational History    Not on file   Tobacco Use    Smoking status: Never Smoker    Smokeless tobacco: Never Used   Substance and Sexual Activity    Alcohol use: No    Drug use: No    Sexual activity: Never   Other Topics Concern    Not on file   Social History Narrative    Not on file     Social Determinants of Health     Financial Resource Strain: Medium Risk    Difficulty of Paying Living Expenses: Somewhat hard   Food Insecurity: No Food Insecurity    Worried About Running Out of Food in the Last Year: Never true    Suraj of Food in the Last Year: Never true   Transportation Needs:     Lack of Transportation (Medical): Not on file    Lack of Transportation (Non-Medical):  Not on file   Physical Activity:     Days of Exercise per Week: Not on file    Minutes of Exercise per Session: Not on file   Stress:     Feeling of Stress : Not on file   Social Connections:     Frequency of Communication with Friends and Family: Not on file    Frequency of Social Gatherings with Friends and Family: Not on file    Attends Scientologist Services: Not on file    Active Member of Clubs or Organizations: Not on file    Attends Club or Organization Meetings: Not on file    Marital Status: Not on file   Intimate Partner Violence:     Fear of Current or Ex-Partner: Not on file    Emotionally Abused: Not on file    Physically Abused: Not on file    Sexually Abused: Not on file   Housing Stability:     Unable to Pay for Housing in the Last Year: Not on file    Number of Places Lived in the Last Year: Not on file    Unstable Housing in the Last Year: Not on file       ROS:   Review of Systems   Constitutional: Positive for fatigue and fever. Negative for activity change, appetite change and chills. HENT: Negative for congestion, dental problem, drooling and ear discharge. Eyes: Negative for pain, discharge and itching. Respiratory: Negative for apnea, choking and chest tightness. Cardiovascular: Negative for chest pain, palpitations and leg swelling. Gastrointestinal: Negative for abdominal distention, abdominal pain and anal bleeding. Endocrine: Negative for cold intolerance, heat intolerance and polydipsia. Genitourinary: Negative for difficulty urinating, dysuria and enuresis. Musculoskeletal: Negative for arthralgias, back pain, gait problem and joint swelling. Skin: Negative for color change, pallor, rash and wound. Allergic/Immunologic: Negative for environmental allergies, food allergies and immunocompromised state. Neurological: Negative for dizziness, facial asymmetry and headaches. Hematological: Negative for adenopathy. Does not bruise/bleed easily. Psychiatric/Behavioral: Negative for agitation, behavioral problems and confusion. Physical Exam:  Vitals:    05/25/22 1452   BP: 124/74   Pulse: (!) 106   Resp: 18   Temp:    SpO2: 99%       PSYCH: mood and affect normal, alert and oriented x 3: No apparent distress, comfortable  EYES: Sclera white, pupils equal round and reactive to light  ENMT:  Hearing normal, trachea midline, ears externally intact  LYMPH: no obvious lympadenopathy in neck. RESP: Respiratory effort was normal with no retractions or use of accessory muscles.   CV:  No pedal edema  GI/ Abdomen: Soft, nondistended, nontender, no guarding, no peritoneal signs  MSK: no clubbing/ no cyanosis/ gaitnormal       Assessment/Plan:  Shayy Onofre is a 64 y.o. male with recurrent large right lower quadrant abscess concerning for perforated appendicitis vs diverticular disease s/p drain in March        Admit to GenSur floor   ABX-- will consult ID due to refractory abscess   NPO, IVF  Continue home medication, insulin sliding scale and lantus (will decrease nightly dose b/c pt is NPO), monitor glucose levels  Daily labs, INR  Will plan for IR drainage of abscess  Pt will need to follow up after drain is removed and acute inflammation is decreased for colonoscopy.    Currently will attempt non- Operative intervention  Pending colonoscopy patient may need operative intervention on a semi-elective basis outpatient     Electronically signed by Lindsay Fuentes DO on 5/25/2022 at 4:32 PM

## 2022-05-26 LAB
ADENOVIRUS BY PCR: NOT DETECTED
ANION GAP SERPL CALCULATED.3IONS-SCNC: 14 MMOL/L (ref 7–16)
ANION GAP SERPL CALCULATED.3IONS-SCNC: 14 MMOL/L (ref 7–16)
ANION GAP SERPL CALCULATED.3IONS-SCNC: 17 MMOL/L (ref 7–16)
ANION GAP SERPL CALCULATED.3IONS-SCNC: 20 MMOL/L (ref 7–16)
BASOPHILS ABSOLUTE: 0.06 E9/L (ref 0–0.2)
BASOPHILS ABSOLUTE: 0.06 E9/L (ref 0–0.2)
BASOPHILS RELATIVE PERCENT: 0.4 % (ref 0–2)
BASOPHILS RELATIVE PERCENT: 0.4 % (ref 0–2)
BORDETELLA PARAPERTUSSIS BY PCR: NOT DETECTED
BORDETELLA PERTUSSIS BY PCR: NOT DETECTED
BUN BLDV-MCNC: 10 MG/DL (ref 6–20)
BUN BLDV-MCNC: 12 MG/DL (ref 6–20)
BUN BLDV-MCNC: 14 MG/DL (ref 6–20)
BUN BLDV-MCNC: 9 MG/DL (ref 6–20)
C-REACTIVE PROTEIN: 16.7 MG/DL (ref 0–0.4)
CALCIUM SERPL-MCNC: 7.5 MG/DL (ref 8.6–10.2)
CALCIUM SERPL-MCNC: 8 MG/DL (ref 8.6–10.2)
CALCIUM SERPL-MCNC: 8 MG/DL (ref 8.6–10.2)
CALCIUM SERPL-MCNC: 8.2 MG/DL (ref 8.6–10.2)
CHLAMYDOPHILIA PNEUMONIAE BY PCR: NOT DETECTED
CHLORIDE BLD-SCNC: 102 MMOL/L (ref 98–107)
CHLORIDE BLD-SCNC: 102 MMOL/L (ref 98–107)
CHLORIDE BLD-SCNC: 103 MMOL/L (ref 98–107)
CHLORIDE BLD-SCNC: 103 MMOL/L (ref 98–107)
CO2: 13 MMOL/L (ref 22–29)
CO2: 15 MMOL/L (ref 22–29)
CO2: 18 MMOL/L (ref 22–29)
CO2: 20 MMOL/L (ref 22–29)
CORONAVIRUS 229E BY PCR: NOT DETECTED
CORONAVIRUS HKU1 BY PCR: NOT DETECTED
CORONAVIRUS NL63 BY PCR: NOT DETECTED
CORONAVIRUS OC43 BY PCR: NOT DETECTED
CREAT SERPL-MCNC: 0.5 MG/DL (ref 0.7–1.2)
CREAT SERPL-MCNC: 0.6 MG/DL (ref 0.7–1.2)
CREAT SERPL-MCNC: 0.6 MG/DL (ref 0.7–1.2)
CREAT SERPL-MCNC: 0.7 MG/DL (ref 0.7–1.2)
EOSINOPHILS ABSOLUTE: 0.02 E9/L (ref 0.05–0.5)
EOSINOPHILS ABSOLUTE: 0.1 E9/L (ref 0.05–0.5)
EOSINOPHILS RELATIVE PERCENT: 0.1 % (ref 0–6)
EOSINOPHILS RELATIVE PERCENT: 0.7 % (ref 0–6)
GFR AFRICAN AMERICAN: >60
GFR NON-AFRICAN AMERICAN: >60 ML/MIN/1.73
GLUCOSE BLD-MCNC: 170 MG/DL (ref 74–99)
GLUCOSE BLD-MCNC: 211 MG/DL (ref 74–99)
GLUCOSE BLD-MCNC: 234 MG/DL (ref 74–99)
GLUCOSE BLD-MCNC: 237 MG/DL (ref 74–99)
HCT VFR BLD CALC: 40.3 % (ref 37–54)
HCT VFR BLD CALC: 41.3 % (ref 37–54)
HEMOGLOBIN: 13 G/DL (ref 12.5–16.5)
HEMOGLOBIN: 13.6 G/DL (ref 12.5–16.5)
HUMAN METAPNEUMOVIRUS BY PCR: NOT DETECTED
HUMAN RHINOVIRUS/ENTEROVIRUS BY PCR: NOT DETECTED
IMMATURE GRANULOCYTES #: 0.1 E9/L
IMMATURE GRANULOCYTES #: 0.14 E9/L
IMMATURE GRANULOCYTES %: 0.7 % (ref 0–5)
IMMATURE GRANULOCYTES %: 0.9 % (ref 0–5)
INFLUENZA A BY PCR: NOT DETECTED
INFLUENZA B BY PCR: NOT DETECTED
LACTIC ACID: 1.5 MMOL/L (ref 0.5–2.2)
LIPASE: 34 U/L (ref 13–60)
LYMPHOCYTES ABSOLUTE: 0.88 E9/L (ref 1.5–4)
LYMPHOCYTES ABSOLUTE: 0.97 E9/L (ref 1.5–4)
LYMPHOCYTES RELATIVE PERCENT: 5.8 % (ref 20–42)
LYMPHOCYTES RELATIVE PERCENT: 6.6 % (ref 20–42)
MAGNESIUM: 1.9 MG/DL (ref 1.6–2.6)
MAGNESIUM: 2.1 MG/DL (ref 1.6–2.6)
MAGNESIUM: 2.1 MG/DL (ref 1.6–2.6)
MAGNESIUM: 2.2 MG/DL (ref 1.6–2.6)
MCH RBC QN AUTO: 28.4 PG (ref 26–35)
MCH RBC QN AUTO: 28.7 PG (ref 26–35)
MCHC RBC AUTO-ENTMCNC: 32.3 % (ref 32–34.5)
MCHC RBC AUTO-ENTMCNC: 32.9 % (ref 32–34.5)
MCV RBC AUTO: 87.1 FL (ref 80–99.9)
MCV RBC AUTO: 88 FL (ref 80–99.9)
METER GLUCOSE: 165 MG/DL (ref 74–99)
METER GLUCOSE: 174 MG/DL (ref 74–99)
METER GLUCOSE: 174 MG/DL (ref 74–99)
METER GLUCOSE: 182 MG/DL (ref 74–99)
METER GLUCOSE: 182 MG/DL (ref 74–99)
METER GLUCOSE: 185 MG/DL (ref 74–99)
METER GLUCOSE: 200 MG/DL (ref 74–99)
METER GLUCOSE: 203 MG/DL (ref 74–99)
METER GLUCOSE: 207 MG/DL (ref 74–99)
METER GLUCOSE: 212 MG/DL (ref 74–99)
METER GLUCOSE: 212 MG/DL (ref 74–99)
METER GLUCOSE: 213 MG/DL (ref 74–99)
METER GLUCOSE: 214 MG/DL (ref 74–99)
METER GLUCOSE: 225 MG/DL (ref 74–99)
METER GLUCOSE: 232 MG/DL (ref 74–99)
METER GLUCOSE: 233 MG/DL (ref 74–99)
METER GLUCOSE: 233 MG/DL (ref 74–99)
METER GLUCOSE: 250 MG/DL (ref 74–99)
METER GLUCOSE: 270 MG/DL (ref 74–99)
MONOCYTES ABSOLUTE: 1.13 E9/L (ref 0.1–0.95)
MONOCYTES ABSOLUTE: 1.2 E9/L (ref 0.1–0.95)
MONOCYTES RELATIVE PERCENT: 7.7 % (ref 2–12)
MONOCYTES RELATIVE PERCENT: 7.9 % (ref 2–12)
MYCOPLASMA PNEUMONIAE BY PCR: NOT DETECTED
NEUTROPHILS ABSOLUTE: 12.48 E9/L (ref 1.8–7.3)
NEUTROPHILS ABSOLUTE: 12.79 E9/L (ref 1.8–7.3)
NEUTROPHILS RELATIVE PERCENT: 84.3 % (ref 43–80)
NEUTROPHILS RELATIVE PERCENT: 84.5 % (ref 43–80)
PARAINFLUENZA VIRUS 1 BY PCR: NOT DETECTED
PARAINFLUENZA VIRUS 2 BY PCR: NOT DETECTED
PARAINFLUENZA VIRUS 3 BY PCR: NOT DETECTED
PARAINFLUENZA VIRUS 4 BY PCR: NOT DETECTED
PDW BLD-RTO: 14.2 FL (ref 11.5–15)
PDW BLD-RTO: 14.3 FL (ref 11.5–15)
PHOSPHORUS: 0.8 MG/DL (ref 2.5–4.5)
PHOSPHORUS: 0.9 MG/DL (ref 2.5–4.5)
PHOSPHORUS: 1.2 MG/DL (ref 2.5–4.5)
PHOSPHORUS: 1.5 MG/DL (ref 2.5–4.5)
PLATELET # BLD: 317 E9/L (ref 130–450)
PLATELET # BLD: 331 E9/L (ref 130–450)
PMV BLD AUTO: 9.5 FL (ref 7–12)
PMV BLD AUTO: 9.6 FL (ref 7–12)
POTASSIUM SERPL-SCNC: 2.9 MMOL/L (ref 3.5–5)
POTASSIUM SERPL-SCNC: 3.3 MMOL/L (ref 3.5–5)
POTASSIUM SERPL-SCNC: 3.3 MMOL/L (ref 3.5–5)
POTASSIUM SERPL-SCNC: 3.7 MMOL/L (ref 3.5–5)
PROCALCITONIN: 0.13 NG/ML (ref 0–0.08)
RBC # BLD: 4.58 E12/L (ref 3.8–5.8)
RBC # BLD: 4.74 E12/L (ref 3.8–5.8)
RESPIRATORY SYNCYTIAL VIRUS BY PCR: NOT DETECTED
SARS-COV-2, PCR: NOT DETECTED
SEDIMENTATION RATE, ERYTHROCYTE: 78 MM/HR (ref 0–15)
SODIUM BLD-SCNC: 134 MMOL/L (ref 132–146)
SODIUM BLD-SCNC: 135 MMOL/L (ref 132–146)
SODIUM BLD-SCNC: 135 MMOL/L (ref 132–146)
SODIUM BLD-SCNC: 137 MMOL/L (ref 132–146)
WBC # BLD: 14.8 E9/L (ref 4.5–11.5)
WBC # BLD: 15.2 E9/L (ref 4.5–11.5)

## 2022-05-26 PROCEDURE — S5553 INSULIN LONG ACTING 5 U: HCPCS | Performed by: INTERNAL MEDICINE

## 2022-05-26 PROCEDURE — 2580000003 HC RX 258: Performed by: STUDENT IN AN ORGANIZED HEALTH CARE EDUCATION/TRAINING PROGRAM

## 2022-05-26 PROCEDURE — 83605 ASSAY OF LACTIC ACID: CPT

## 2022-05-26 PROCEDURE — 6360000002 HC RX W HCPCS: Performed by: STUDENT IN AN ORGANIZED HEALTH CARE EDUCATION/TRAINING PROGRAM

## 2022-05-26 PROCEDURE — 0202U NFCT DS 22 TRGT SARS-COV-2: CPT

## 2022-05-26 PROCEDURE — 80048 BASIC METABOLIC PNL TOTAL CA: CPT

## 2022-05-26 PROCEDURE — 83690 ASSAY OF LIPASE: CPT

## 2022-05-26 PROCEDURE — 2580000003 HC RX 258: Performed by: INTERNAL MEDICINE

## 2022-05-26 PROCEDURE — 2000000000 HC ICU R&B

## 2022-05-26 PROCEDURE — 84100 ASSAY OF PHOSPHORUS: CPT

## 2022-05-26 PROCEDURE — 6370000000 HC RX 637 (ALT 250 FOR IP): Performed by: INTERNAL MEDICINE

## 2022-05-26 PROCEDURE — 2500000003 HC RX 250 WO HCPCS: Performed by: INTERNAL MEDICINE

## 2022-05-26 PROCEDURE — 99222 1ST HOSP IP/OBS MODERATE 55: CPT | Performed by: INTERNAL MEDICINE

## 2022-05-26 PROCEDURE — 87040 BLOOD CULTURE FOR BACTERIA: CPT

## 2022-05-26 PROCEDURE — 6370000000 HC RX 637 (ALT 250 FOR IP): Performed by: STUDENT IN AN ORGANIZED HEALTH CARE EDUCATION/TRAINING PROGRAM

## 2022-05-26 PROCEDURE — 85651 RBC SED RATE NONAUTOMATED: CPT

## 2022-05-26 PROCEDURE — 86140 C-REACTIVE PROTEIN: CPT

## 2022-05-26 PROCEDURE — 85025 COMPLETE CBC W/AUTO DIFF WBC: CPT

## 2022-05-26 PROCEDURE — 6360000002 HC RX W HCPCS: Performed by: INTERNAL MEDICINE

## 2022-05-26 PROCEDURE — 82962 GLUCOSE BLOOD TEST: CPT

## 2022-05-26 PROCEDURE — 99253 IP/OBS CNSLTJ NEW/EST LOW 45: CPT | Performed by: SURGERY

## 2022-05-26 PROCEDURE — 36415 COLL VENOUS BLD VENIPUNCTURE: CPT

## 2022-05-26 PROCEDURE — 84145 PROCALCITONIN (PCT): CPT

## 2022-05-26 PROCEDURE — 83735 ASSAY OF MAGNESIUM: CPT

## 2022-05-26 PROCEDURE — 2500000003 HC RX 250 WO HCPCS: Performed by: STUDENT IN AN ORGANIZED HEALTH CARE EDUCATION/TRAINING PROGRAM

## 2022-05-26 RX ORDER — SODIUM CHLORIDE 0.9 % (FLUSH) 0.9 %
5-40 SYRINGE (ML) INJECTION EVERY 12 HOURS SCHEDULED
Status: DISCONTINUED | OUTPATIENT
Start: 2022-05-26 | End: 2022-05-31 | Stop reason: HOSPADM

## 2022-05-26 RX ORDER — POTASSIUM CHLORIDE 7.45 MG/ML
10 INJECTION INTRAVENOUS
Status: COMPLETED | OUTPATIENT
Start: 2022-05-26 | End: 2022-05-26

## 2022-05-26 RX ORDER — SODIUM CHLORIDE 9 MG/ML
INJECTION, SOLUTION INTRAVENOUS PRN
Status: DISCONTINUED | OUTPATIENT
Start: 2022-05-26 | End: 2022-05-31 | Stop reason: HOSPADM

## 2022-05-26 RX ORDER — SODIUM CHLORIDE 0.9 % (FLUSH) 0.9 %
5-40 SYRINGE (ML) INJECTION PRN
Status: DISCONTINUED | OUTPATIENT
Start: 2022-05-26 | End: 2022-05-31 | Stop reason: HOSPADM

## 2022-05-26 RX ORDER — POTASSIUM CHLORIDE 7.45 MG/ML
40 INJECTION INTRAVENOUS ONCE
Status: DISCONTINUED | OUTPATIENT
Start: 2022-05-26 | End: 2022-05-26 | Stop reason: SDUPTHER

## 2022-05-26 RX ORDER — INSULIN LISPRO 100 [IU]/ML
0-18 INJECTION, SOLUTION INTRAVENOUS; SUBCUTANEOUS EVERY 4 HOURS
Status: DISCONTINUED | OUTPATIENT
Start: 2022-05-26 | End: 2022-05-27

## 2022-05-26 RX ORDER — ACETAMINOPHEN 325 MG/1
650 TABLET ORAL EVERY 6 HOURS PRN
Status: DISCONTINUED | OUTPATIENT
Start: 2022-05-26 | End: 2022-05-31 | Stop reason: HOSPADM

## 2022-05-26 RX ORDER — POLYETHYLENE GLYCOL 3350 17 G/17G
17 POWDER, FOR SOLUTION ORAL DAILY PRN
Status: DISCONTINUED | OUTPATIENT
Start: 2022-05-26 | End: 2022-05-31 | Stop reason: HOSPADM

## 2022-05-26 RX ORDER — ACETAMINOPHEN 650 MG/1
650 SUPPOSITORY RECTAL EVERY 6 HOURS PRN
Status: DISCONTINUED | OUTPATIENT
Start: 2022-05-26 | End: 2022-05-31 | Stop reason: HOSPADM

## 2022-05-26 RX ORDER — INSULIN GLARGINE-YFGN 100 [IU]/ML
55 INJECTION, SOLUTION SUBCUTANEOUS DAILY
Status: DISCONTINUED | OUTPATIENT
Start: 2022-05-26 | End: 2022-05-27

## 2022-05-26 RX ADMIN — Medication 10 MEQ: at 18:21

## 2022-05-26 RX ADMIN — Medication 10 MEQ: at 17:20

## 2022-05-26 RX ADMIN — DEXTROSE AND SODIUM CHLORIDE: 5; 450 INJECTION, SOLUTION INTRAVENOUS at 03:58

## 2022-05-26 RX ADMIN — SODIUM BICARBONATE: 84 INJECTION, SOLUTION INTRAVENOUS at 13:20

## 2022-05-26 RX ADMIN — Medication 10 MEQ: at 20:14

## 2022-05-26 RX ADMIN — POTASSIUM PHOSPHATE, MONOBASIC AND POTASSIUM PHOSPHATE, DIBASIC 20 MMOL: 224; 236 INJECTION, SOLUTION, CONCENTRATE INTRAVENOUS at 10:18

## 2022-05-26 RX ADMIN — SODIUM CHLORIDE 8.65 UNITS/HR: 9 INJECTION, SOLUTION INTRAVENOUS at 13:45

## 2022-05-26 RX ADMIN — SODIUM CHLORIDE, PRESERVATIVE FREE 10 ML: 5 INJECTION INTRAVENOUS at 09:28

## 2022-05-26 RX ADMIN — POTASSIUM CHLORIDE 10 MEQ: 7.46 INJECTION, SOLUTION INTRAVENOUS at 09:23

## 2022-05-26 RX ADMIN — ATORVASTATIN CALCIUM 40 MG: 40 TABLET, FILM COATED ORAL at 00:23

## 2022-05-26 RX ADMIN — POTASSIUM CHLORIDE 10 MEQ: 7.46 INJECTION, SOLUTION INTRAVENOUS at 12:05

## 2022-05-26 RX ADMIN — PIPERACILLIN AND TAZOBACTAM 3375 MG: 3; .375 INJECTION, POWDER, LYOPHILIZED, FOR SOLUTION INTRAVENOUS at 23:53

## 2022-05-26 RX ADMIN — PIPERACILLIN AND TAZOBACTAM 3375 MG: 3; .375 INJECTION, POWDER, LYOPHILIZED, FOR SOLUTION INTRAVENOUS at 09:25

## 2022-05-26 RX ADMIN — Medication 10 ML: at 13:23

## 2022-05-26 RX ADMIN — Medication 10 ML: at 20:12

## 2022-05-26 RX ADMIN — SODIUM PHOSPHATE, MONOBASIC, MONOHYDRATE 20 MMOL: 276; 142 INJECTION, SOLUTION INTRAVENOUS at 18:10

## 2022-05-26 RX ADMIN — POTASSIUM CHLORIDE 10 MEQ: 7.46 INJECTION, SOLUTION INTRAVENOUS at 10:13

## 2022-05-26 RX ADMIN — Medication 10 MEQ: at 21:32

## 2022-05-26 RX ADMIN — ENOXAPARIN SODIUM 30 MG: 100 INJECTION SUBCUTANEOUS at 00:22

## 2022-05-26 RX ADMIN — DEXTROSE AND SODIUM CHLORIDE: 5; 450 INJECTION, SOLUTION INTRAVENOUS at 18:08

## 2022-05-26 RX ADMIN — SODIUM BICARBONATE: 84 INJECTION, SOLUTION INTRAVENOUS at 04:44

## 2022-05-26 RX ADMIN — POTASSIUM CHLORIDE 10 MEQ: 7.46 INJECTION, SOLUTION INTRAVENOUS at 13:08

## 2022-05-26 RX ADMIN — ACETAMINOPHEN 650 MG: 325 TABLET ORAL at 00:23

## 2022-05-26 RX ADMIN — INSULIN GLARGINE-YFGN 55 UNITS: 100 INJECTION, SOLUTION SUBCUTANEOUS at 17:28

## 2022-05-26 RX ADMIN — INSULIN LISPRO 6 UNITS: 100 INJECTION, SOLUTION INTRAVENOUS; SUBCUTANEOUS at 17:28

## 2022-05-26 RX ADMIN — DEXTROSE AND SODIUM CHLORIDE: 5; 450 INJECTION, SOLUTION INTRAVENOUS at 11:01

## 2022-05-26 RX ADMIN — INSULIN LISPRO 6 UNITS: 100 INJECTION, SOLUTION INTRAVENOUS; SUBCUTANEOUS at 20:12

## 2022-05-26 RX ADMIN — POTASSIUM BICARBONATE 20 MEQ: 782 TABLET, EFFERVESCENT ORAL at 13:23

## 2022-05-26 RX ADMIN — PIPERACILLIN AND TAZOBACTAM 3375 MG: 3; .375 INJECTION, POWDER, LYOPHILIZED, FOR SOLUTION INTRAVENOUS at 17:19

## 2022-05-26 ASSESSMENT — PAIN SCALES - GENERAL
PAINLEVEL_OUTOF10: 0

## 2022-05-26 NOTE — H&P
Hospitalist History & Physical      PCP: Dawson Tam MD    Date of Service: Pt seen/examined on 5/25/2022     Chief Complaint:  had concerns including Fatigue (feels dehydrated, diarrhea on Saturday. Emesis last 2 days. ). History Of Present Illness:    Mr. Talya Do, a 64y.o. year old male  who  has a past medical history of Appendicitis with abscess, Diabetes mellitus ([address] Gangrene of toe of left foot ([address] Hyperlipidemia, Hyperlipidemia, Sleep apnea, and Vitamin D deficiency. 63-year-old male presenting for evaluation of fatigue and nausea. Patient has a significant past medical history of diabetes and associated morbidities, and previous intra-abdominal abscess secondary to perforated appendicitis s/p IR drainage. Patient was feeling fatigued and developed nausea last 24 hours and went [facility] to be evaluated. CT imaging revealed return of his intra-abdominal fluid collection in the right lower quadrant. This is compared to his CT scan after drain was removed at the end of March. Patient however, is not complaining of any abdominal pain. Patient states he had 1 episode of diarrhea last Friday but has otherwise had normal bowel movements. Was tolerating a diet up until the last 24 hours. No peritoneal signs on exam.  Mild tachycardia to 104 normotensive. Patient does have a leukocytosis to 20.6    Patient noted to have a beta hydroxybutyrate of 4.50 and glucose of 242. Venous pH of 7.14. Patient was diagnosed with DKA and started on insulin infusion will be admitted to the ICU.       Past Medical History:   Diagnosis Date    Appendicitis with abscess     appendix not removed as of 5/25/22    Diabetes mellitus ([address]     Gangrene of toe of left foot ([address] 11/17/2017    Hyperlipidemia     Hyperlipidemia     Sleep apnea     Vitamin D deficiency        Past Surgical History:   Procedure Laterality Date    OTHER SURGICAL HISTORY Left 11/17/2017    Amputation of left great toe.  TOE AMPUTATION      TOE AMPUTATION Left 10/21/2019    LEFT SECOND TOE AMPUTATION performed by Chucho Dimas DPM at [address]       Prior to Admission medications    Medication Sig Start Date End Date Taking? Authorizing Provider   lisinopril (PRINIVIL;ZESTRIL) 5 MG tablet Take 1 tablet by mouth daily 4/19/22   Betito Harvey MD   loratadine (CLARITIN) 10 MG tablet Take 1 tablet by mouth daily 4/19/22   Betito Harvey MD   empagliflozin (JARDIANCE) 10 MG tablet Take 1 tablet by mouth daily 3/31/22   Betito Harvey MD   atorvastatin (LIPITOR) 40 MG tablet Take 1 tablet by mouth daily 3/31/22   Betito Rene MD   glucose monitoring (FREESTYLE FREEDOM) kit 1 kit by Does not apply route 4 times daily as needed (monitoring blood sugars) 3/31/22   Betito Harvey MD   FreeStyle Lancets MISC 1 each by Does not apply route 4 times daily as needed (monitor blood sugar) 3/31/22 6/29/22  Betito Harvey MD   blood glucose test strips (FREESTYLE LITE) strip 1 each by In Vitro route daily As needed. 3/31/22   Betito Harvey MD   Blood Glucose Monitoring Suppl (ONE TOUCH ULTRA 2) w/Device KIT 1 kit by Does not apply route 4 times daily as needed (monitoring blood sugar) Check blood sugar 4 times daily and prn. Please provide diabetic supplies for use with this monitor 3/31/22   Betito Rene MD   blood glucose test strips (ONETOUCH ULTRA) strip 1 each by In Vitro route 4 times daily as needed (monitor blood sugar with insulin use) As needed.  3/31/22   Betito Rene MD   ONE TOUCH ULTRASOFT LANCETS MISC 1 each by Does not apply route 4 times daily as needed (monitor blood sugar with insulin use) 3/31/22   Betito Harvey MD   insulin glargine (LANTUS SOLOSTAR) 100 UNIT/ML injection pen Inject 60 Units into the skin nightly 3/15/22   Betito Harvey MD   acetaminophen (TYLENOL) 500 MG tablet Take 2 tablets by mouth every 8 hours as needed for Pain 11/21/17   Wendi Lazar MD   Blood Glucose Monitoring Suppl (D-CARE GLUCOMETER) w/Device KIT 1 each by Does not apply route 4 times daily (with meals and nightly) 11/21/17   Omar Wheeler MD   Insulin Pen Needle 32G X 4 MM MISC 1 each by Does not apply route 4 times daily (before meals and nightly) 11/21/17   Omar Wheeler MD         Allergies:  Patient has no known allergies. Social History:    TOBACCO:   reports that he has never smoked. He has never used smokeless tobacco.  ETOH:   reports no history of alcohol use. Family History:    Reviewed in detail and negative for DM, CAD, Cancer, CVA. Positive as follows\"      Problem Relation Age of Onset    Depression Mother     Cancer Mother     Atrial Fibrillation Mother     Depression Father     Cancer Father     Depression Maternal Grandmother        REVIEW OF SYSTEMS:   Pertinent positives as noted in the HPI. All other systems reviewed and negative. PHYSICAL EXAM:  /74   Pulse (!) 106   Temp 97.2 °F (36.2 °C) (Temporal)   Resp 18   Ht 6' 2\" (1.88 m)   Wt 291 lb (132 kg)   SpO2 99%   BMI 37.36 kg/m²   General appearance: No apparent distress, appears stated age and cooperative. HEENT: Normal cephalic, atraumatic without obvious deformity. Pupils equal, round, and reactive to light. Extra ocular muscles intact. Conjunctivae/corneas clear. Neck: Supple, with full range of motion. No jugular venous distention. Trachea midline. Respiratory: CTA  Cardiovascular: RRR  Abdomen: S/NT/ND  Musculoskeletal: No clubbing, cyanosis, edema of bilateral lower extremities. Brisk capillary refill. Skin: Normal skin color. No rashes or lesions. Neurologic:  Neurovascularly intact without any focal sensory/motor deficits.  Cranial nerves: II-XII intact, grossly non-focal.  Psychiatric: Alert and oriented, thought content appropriate, normal insight    Reviewed EKG and CXR personally      CBC:   Recent Labs     05/25/22  0848   WBC 20.6*   RBC 5.35   HGB 15.6   HCT 46.7   MCV 87.3 RDW 14.2        BMP:   Recent Labs     05/25/22  0848 05/25/22  0900 05/25/22  1130 05/25/22  1526   *  --  131* 132   K 4.3  --  4.7 4.5   CL 95*  --  94* 100   CO2 8*  --  8* 6*   BUN 17  --  17 16   CREATININE 0.8  --  0.8 0.6*   MG  --  2.4  --   --      LFT:  Recent Labs     05/25/22  0848   PROT 8.8*   ALKPHOS 143*   ALT 18   AST 17   BILITOT 0.3     CE:  Recent Labs     05/25/22  1242   CKTOTAL 100     PT/INR: No results for input(s): INR, APTT in the last 72 hours. BNP: No results for input(s): BNP in the last 72 hours. ESR:   Lab Results   Component Value Date    SEDRATE 58 (H) 03/28/2022     CRP:   Lab Results   Component Value Date    CRP 0.9 (H) 03/28/2022     D Dimer: No results found for: DDIMER   Folate and B12:   Lab Results   Component Value Date    WCIUMWKI69 366 08/13/2015   ,   Lab Results   Component Value Date    FOLATE 15.6 08/13/2015     Lactic Acid:   Lab Results   Component Value Date    LACTA 2.0 05/25/2022     Thyroid Studies:   Lab Results   Component Value Date    TSH 1.300 03/07/2022       Oupatient labs:  Lab Results   Component Value Date    CHOL 167 08/10/2015    TRIG 153 (H) 08/10/2015    HDL 29 08/10/2015    LDLCALC 107 (H) 08/10/2015    TSH 1.300 03/07/2022    INR 1.6 03/08/2022    LABA1C 11.5 (H) 03/06/2022       Urinalysis:    Lab Results   Component Value Date    NITRU Negative 05/25/2022    WBCUA 0-1 05/25/2022    BACTERIA RARE 05/25/2022    RBCUA 1-3 05/25/2022    BLOODU MODERATE 05/25/2022    SPECGRAV >=1.030 05/25/2022    GLUCOSEU 500 05/25/2022       Imaging:  CT CHEST WO CONTRAST    Result Date: 5/25/2022  EXAMINATION: CT OF THE CHEST WITHOUT CONTRAST 5/25/2022 11:30 am TECHNIQUE: CT of the chest was performed without the administration of intravenous contrast. Multiplanar reformatted images are provided for review.  Automated exposure control, iterative reconstruction, and/or weight based adjustment of the mA/kV was utilized to reduce the radiation dose to as low as reasonably achievable. COMPARISON: None. HISTORY: ORDERING SYSTEM PROVIDED HISTORY: rule out PE TECHNOLOGIST PROVIDED HISTORY: Reason for exam:->rule out PE FINDINGS: Mediastinum:  No evidence of mediastinal, hilar or axillary lymphadenopathy. Pulmonary artery is slightly prominent at 3.8 cm which can be associated with pulmonary artery hypertension. Due to lack of IV contrast, evaluation for pulmonary embolism is not possible. The mid and distal esophagus demonstrates slight wall prominence, consider esophagitis. There is fluid in the stomach. Heart is normal in size. Lungs/pleura:  Lungs are clear without focal consolidation or pulmonary edema. Upper Abdomen:  Limited images of the upper abdomen demonstrate no acute abnormality. Soft Tissues/Bones:  No acute abnormality of the visualized osseous structures. 1.  Mildly prominent pulmonary artery, consider pulmonary artery hypertension. 2.  Wall thickening of the mid and distal esophagus, consider esophagitis. 3.  Lungs are clear. CT ABDOMEN PELVIS W IV CONTRAST Additional Contrast? None    Result Date: 5/25/2022  EXAMINATION: CT OF THE ABDOMEN AND PELVIS WITH CONTRAST 5/25/2022 11:30 am TECHNIQUE: CT of the abdomen and pelvis was performed with the administration of intravenous contrast. Multiplanar reformatted images are provided for review. Automated exposure control, iterative reconstruction, and/or weight based adjustment of the mA/kV was utilized to reduce the radiation dose to as low as reasonably achievable. COMPARISON: CT abdomen and pelvis 03/22/2022 HISTORY: ORDERING SYSTEM PROVIDED HISTORY: acidosis, nvd TECHNOLOGIST PROVIDED HISTORY: Reason for exam:->acidosis, nvd Additional Contrast?->None Decision Support Exception - unselect if not a suspected or confirmed emergency medical condition->Emergency Medical Condition (MA) FINDINGS: The liver, spleen, and adrenal glands are unremarkable.   There is mild fatty replacement of the pancreas without evidence of focal lesion. There are low-density lesions about the right and left kidney which may represent cysts. The gallbladder is intact without evidence of biliary ductal dilatation. There is no evidence of bowel obstruction, pneumoperitoneum, or ascites. There is colonic diverticulosis without evidence of diverticulitis. There has been reaccumulation of loculated extraperitoneal fluid collection in the right lower quadrant. This measures approximately 10.6 x 5.1 x 13.4 cm in size. This is in the region of the appendix. There is also loss of fat plane between this fluid collection and the sigmoid colon. There is previously a percutaneous drainage catheter in this region. There is fat containing umbilical hernia without herniation of bowel. There is arteriosclerosis without abdominal aortic aneurysm. The lung bases are clear. There are degenerative changes within the spine. 1. There has been reaccumulation of fluid in the right lower quadrant extraperitoneal collection since prior examination with interval removal of percutaneous drainage catheter. 2. Colonic diverticulosis without evidence of diverticulitis. XR CHEST PORTABLE    Result Date: 5/25/2022  EXAMINATION: ONE XRAY VIEW OF THE CHEST 5/25/2022 9:23 am COMPARISON: 05/03/2022 HISTORY: ORDERING SYSTEM PROVIDED HISTORY: emesis TECHNOLOGIST PROVIDED HISTORY: Reason for exam:->emesis FINDINGS: The lungs are without acute focal process. There is no effusion or pneumothorax. The cardiomediastinal silhouette is without acute process. The osseous structures are without acute process. No acute process.        ASSESSMENT:  -Intra-abdominal abscess  -Diabetic ketoacidosis  -Leukocytosis  -Hypertension  -Hyperlipidemia      PLAN:  -Admit to the ICU  -Consult critical care  -General surgery following  -Zosyn and vancomycin  -IR consult for abscess drainage  -Insulin fusion per DKA protocol  -IV fluids per DKA protocol  -Monitor serum electrolytes  -N.p.o.  -Continue home medications        Diet: Diet NPO Exceptions are: Sips of Water with Meds  Code Status: Full Code  Surrogate decision maker confirmed with patient:   Extended Emergency Contact Information  Primary Emergency Contact: Karen Pablo  Address: [address], [address] Phone: [phone]  Mobile Phone: [phone]  Relation: Spouse   needed? No    DVT Prophylaxis: []Lovenox []Heparin []PCD [] [address] []Encouraged ambulation  Disposition: []Med/Surg [] Intermediate [] ICU/CCU  Admit status: [] Observation [] Inpatient     +++++++++++++++++++++++++++++++++++++++++++++++++  Ron Sampson, DO  +++++++++++++++++++++++++++++++++++++++++++++++++  NOTE: This report was transcribed using voice recognition software. Every effort was made to ensure accuracy; however, inadvertent computerized transcription errors may be present.

## 2022-05-26 NOTE — CONSULTS
Medical Intensive Care Unit     Rj Yu6  Resident Consult Note    Date and time: 5/26/2022 1:53 AM  Patient's name:  Humaira Juarez  Medical Record Number: [id number]  Patient's account/billing number: [de-identified]  Patient's YOB: 1965  Age: 64 y.o. Date of Admission: 5/25/2022  8:19 AM  Length of stay during current admission: 1    Primary Care Physician: Domi Jacob MD  ICU Attending Physician: Dr. García Solid Status: Full Code    Reason for ICU admission: DKA    History of Present Illness:   Patient is a 64 y.o. male with PMHx of IDDM, recurrent right lower quadrant abdominal abscess, HLD amd PINEDA who presented with n/v and fatigue of 5 days. Patient had 1 episode of nonbloody loose bowel movement 5 days ago. Patient then started to feel unwell and developed nausea, vomiting and fatigue persistent over the past 4 days. Patient presented to ED with concerns of recurrence of his right lower quadrant abdominal abscess. Patient denies fever, chills, abdominal pain, chest pain, sob and dysuria. Patient does admit to missing doses of his antidiabetic meds. He denies alcohol intake and substance use. In the ED, patient's vitals remained stable. Labs were significant for Na 131, Cl 95, bicarb 8, AG 28, glucose 225, troponin 18 --> 15, alk phos 143, wbc 20.6, BHB >4.50. ABG showed pH 7.14 with bicarb of 5. Ua showed glucose 500, ketones >80, protein 30. UDS and SDS were negative. EKG showed sinus tachycardia with . CT head was negative. CT chest was concerning for possible pulmonary hypertension and possible esophagitis. CT abdomen showed fluid accumulation in RLQ concerning for recurrence of RLQ abdominal abscess. General surgery following with plans for IR drainage of fluid. Upon arrival in unit, patient was awake and oriented x 3. VS stable.      ED meds: zosyn, vanc, tylenol, lipitor, lovenox  ED IVF: total 3 L NS bolus, D5  Half NS at 150 cc/hr  ED drips: bicarb, insulin       CURRENT VENTILATION STATUS:   [] Ventilator  [] BIPAP  [] Nasal Cannula [x] Room Air      SECRETIONS   Amount:  [] Small [] Moderate  [] Large [x] None    Color:     [] White [] Colored  [] Bloody    SEDATION:  RAAS Score:  [] Propofol gtt  [] Versed gtt  [] Fentanyl gtt  [x] No Sedation    PARALYZED:  [x] No    [] Yes    VASOPRESSORS:  [x] No    [] Yes    If yes -   [] Levophed       [] Dopamine     [] Vasopressin       [] Dobutamine  [] Phenylephrine         [] Epinephrine    CENTRAL LINES:     [x] No   [] Yes   (Date of Insertion:   )           If yes -     [] Right IJ     [] Left IJ [] Right Femoral [] Left Femoral                   [] Right Subclavian [] Left Subclavian     SIERRA'S CATHETER:   [x] No   [] Yes  (Date of Insertion:   )     URINE OUTPUT:            [] Good   [] Low              [] Anuric    REVIEW OF SYSTEMS:    · Constitutional: No fever, no chills, no change in weight; decreased appetite  · HEENT: No blurred vision, no ear problems, no sore throat, no rhinorrhea. · Respiratory: No cough, no sputum production, no pleuritic chest pain, no shortness of breath  · Cardiology: No angina, no dyspnea on exertion, no paroxysmal nocturnal dyspnea, no orthopnea, no palpitation, no leg swelling. · Gastroenterology: No dysphagia, no reflux; no abdominal pain, + nausea and vomiting; no constipation, + diarrhea.  No hematochezia   · Genitourinary: No dysuria, no frequency, hesitancy; no hematuria  · Musculoskeletal: no joint pain, no myalgia, no change in range of movement  · Neurology: no focal weakness in extremities, no slurred speech, no double vision, no tingling or numbness sensation  · Endocrinology: no temperature intolerance, no polyphagia, polydipsia or polyuria  · Hematology: no increased bleeding, no bruising, no lymphadenopathy  · Skin: no skin changes noticed by patient  · Psychology: no depressed mood, no suicidal ideation    OBJECTIVE:     VITAL SIGNS:  /68 Pulse 99   Temp 98.1 °F (36.7 °C) (Oral)   Resp 22   Ht 6' 2\" (1.88 m)   Wt 291 lb (132 kg)   SpO2 97%   BMI 37.36 kg/m²   Tmax over 24 hours:  Temp (24hrs), Av.7 °F (36.5 °C), Min:97.2 °F (36.2 °C), Max:98.1 °F (36.7 °C)      Patient Vitals for the past 6 hrs:   BP Temp Temp src Pulse Resp SpO2   22 0023 131/68 98.1 °F (36.7 °C) Oral 99 22 97 %         Intake/Output Summary (Last 24 hours) at 2022 0153  Last data filed at 2022 2119  Gross per 24 hour   Intake --   Output 600 ml   Net -600 ml     Wt Readings from Last 2 Encounters:   22 291 lb (132 kg)   21 (!) 345 lb (156.5 kg)     Body mass index is 37.36 kg/m².       PHYSICAL EXAMINATION:  General appearance - alert, well appearing, and in no distress  Mental status - alert, oriented to person, place, and time  Eyes - pupils equal and reactive, extraocular eye movements intact  Neck - supple, no significant adenopathy, no JVD, no carotid bruits  Chest - clear to auscultation, no wheezes, rales or rhonchi, symmetric air entry  Heart - normal rate, regular rhythm, normal S1, S2, no murmurs, rubs, clicks or gallops  Abdomen - soft, nontender, nondistended, no masses or organomegaly, no guarding, no rebound tenderness, normoactive bowel sounds  Neurological - alert, oriented, CN 2-12 grossly intact  Extremities - peripheral pulses +2, no pedal edema, no clubbing or cyanosis, +stasis dermatitis bilateral LE  Skin -  no rashes, no suspicious skin lesions noted      Any additional physical findings:    MEDICATIONS:  Scheduled Meds:   piperacillin-tazobactam  3,375 mg IntraVENous Q8H    sodium chloride flush  10 mL IntraVENous 2 times per day    enoxaparin  30 mg SubCUTAneous BID    acetaminophen  650 mg Oral Q6H    atorvastatin  40 mg Oral Daily    lisinopril  5 mg Oral Daily     Continuous Infusions:   sodium chloride      insulin 1.22 Units/hr (22 0026)    dextrose 5 % and 0.45 % NaCl 150 mL/hr at 22 2240    sodium chloride      sodium bicarbonate infusion 150 mL/hr at 05/25/22 2330     PRN Meds:   sodium chloride flush, 10 mL, PRN  sodium chloride, , PRN  ondansetron, 4 mg, Q8H PRN   Or  ondansetron, 4 mg, Q6H PRN  glucose, 4 tablet, PRN  glucagon (rDNA), 1 mg, PRN  dextrose bolus, 125 mL, PRN   Or  dextrose bolus, 250 mL, PRN  potassium chloride, 10 mEq, PRN  magnesium sulfate, 1,000 mg, PRN  sodium phosphate IVPB, 10 mmol, PRN   Or  sodium phosphate IVPB, 15 mmol, PRN   Or  sodium phosphate IVPB, 20 mmol, PRN  dextrose 5 % and 0.45 % NaCl, , Continuous PRN        VENT SETTINGS (Comprehensive) (if applicable): Additional Respiratory Assessments  Heart Rate: 99  Resp: 22  SpO2: 97 %    ABGs:   Recent Labs     05/25/22  1048   HCO3 5.0*   BE -20.5*   O2SAT 96.5       Laboratory findings:  Complete Blood Count:   Recent Labs     05/25/22  0848   WBC 20.6*   HGB 15.6   HCT 46.7           Last 3 Blood Glucose:   Recent Labs     05/25/22  1130 05/25/22  1526 05/25/22  2139   GLUCOSE 226* 242* 240*        PT/INR:    Lab Results   Component Value Date    PROTIME 13.1 05/25/2022    INR 1.2 05/25/2022     PTT:    Lab Results   Component Value Date    APTT 25.7 03/06/2022       Comprehensive Metabolic Profile:   Recent Labs     05/25/22  0848 05/25/22  0848 05/25/22  1130 05/25/22  1526 05/25/22  2139   *   < > 131* 132 130*   K 4.3   < > 4.7 4.5 4.8   CL 95*   < > 94* 100 98   CO2 8*   < > 8* 6* 9*   BUN 17   < > 17 16 15   CREATININE 0.8   < > 0.8 0.6* 0.6*   GLUCOSE 225*   < > 226* 242* 240*   CALCIUM 9.5   < > 9.4 8.1* 8.5*   PROT 8.8*  --   --   --   --    LABALBU 3.6  --   --   --   --    BILITOT 0.3  --   --   --   --    ALKPHOS 143*  --   --   --   --    AST 17  --   --   --   --    ALT 18  --   --   --   --     < > = values in this interval not displayed.       Magnesium:   Lab Results   Component Value Date    MG 2.6 05/25/2022     Phosphorus:   Lab Results   Component Value Date    PHOS 2.2 05/25/2022 Ionized Calcium: No results found for: CAION   Troponin: No results for input(s): TROPONINI in the last 72 hours. Radiology/Imaging:   CT ABDOMEN PELVIS W IV CONTRAST Additional Contrast? None   Final Result   1. There has been reaccumulation of fluid in the right lower quadrant   extraperitoneal collection since prior examination with interval removal of   percutaneous drainage catheter. 2. Colonic diverticulosis without evidence of diverticulitis. CT CHEST WO CONTRAST   Final Result   1. Mildly prominent pulmonary artery, consider pulmonary artery hypertension. 2.  Wall thickening of the mid and distal esophagus, consider esophagitis. 3.  Lungs are clear. XR CHEST PORTABLE   Final Result   No acute process. IR INTERVENTIONAL RADIOLOGY PROCEDURE REQUEST    (Results Pending)         ASSESSMENT  And PLAN:     ASSESSMENT:   1. Euglycemic DKA 2/2 empagliflozin vs medication noncompliance vs infection  · BHB > 4.50  · Bicarb 5 on ABG, 8 on CMP  · BS <300   · ABG pH 7.158, CO2 14  · UA: glucose 500, ketones >80, protein 30  2. HAGMA 2/2 DKA  · AG 28  3. Recurrent intraabdomial abscess, RLQ  · Previously admitted 3/6/22 for RLQ abscess concerning for ruptured appendicitis, underwent IR drainage  · Zosyn 3/10-3/30  · Cefdinir 3/30-4/29  4. Elevated troponin   · 18 --> 15  5. Elevated alk phos  6. Hx of IDDM - Lantus 60 u nightly, empagliflozin, A1C 11.5  7. Hx of HLD  8. Hx of PINEDA      PLAN:  1. Continue DKA protocol until AG closes x 2  2. Continue D5 half NS   3. Continue bicarb drip until bicarb >12  4. Ordered lactic acid level  5. Repeat blood cultures  6. Ordered lipase  7. Ordered procal, ESR and CRP  8. Ordered repeat ABG and CBC  9. NPO  10. Continue zosyn  11. Consult endo for IDDM       WEAN PER PROTOCOL:  [] No   [] Yes  [x] N/A    ICU PROPHYLAXIS:  Stress ulcer:  [] PPI Agent  [] [id number] [] Sucralfate  [] Other:  VTE:   [x] Enoxaparin  [] Unfract.  Heparin Subcut  [] EPC Cuffs    NUTRITION:  [x] NPO [] Tube Feeding (Specify: ) [] TPN  [] PO (Diet: Diet NPO Exceptions are: Sips of Water with Meds)    INSULIN DRIP:   [] No   [x] Yes    CONSULTATION NEEDED:   [x] No   [] Yes    FAMILY UPDATED:    [] No   [] Yes    TRANSFER OUT OF ICU:   [x] No   [] Yes    Philomena Osei MD, PGY-1  Attending Physician: Dr. Nancy Lozano  5/26/2022, 1:53 AM      Addendum:    I personally saw, examined and provided care for the patient. Radiographs, labs and medication list were reviewed by me independently. We will continue Zosyn for now. Follow cultures. IR has been consulted for placement of a CT-guided drain. We will send fluid for cultures. Continue insulin drip per DKA protocol. Once gap is closed follow with Dr. Khadijah Holley recommendations for insulin regimen. Lantus 45 units followed by sliding scale. I spoke with bedside nursing, therapists and consultants. Critical care services and times documented are independent of procedures and multidisciplinary rounds with Residents. Additionally comprehensive, multidisciplinary rounds were conducted with the MICU team. The case was discussed in detail and plans for care were established. Review of Residents documentation was conducted and revisions were made as appropriate. I agree with the above documented exam, problem list and plan of care.   Court Coles MD   CCT excluding procedures:38'

## 2022-05-26 NOTE — PROCEDURES
Tommy with Pre[facility] Kohli, RN notified that pts Lovenox must be held for one day prior to procedure. Pt is to be npo after MN and Lovenox held. Verbalized understanding.

## 2022-05-26 NOTE — CONSULTS
General Surgery    Consult Note     Patient's Name/Date of Birth: Brett Ochoa /1965 (61 y.o.)    Date: May 25, 2022      CC: intra-abdominal abscess      HPI:  51-year-old male presenting for evaluation of fatigue and nausea. Patient has a significant past medical history of diabetes and associated morbidities, and previous intra-abdominal abscess secondary to perforated appendicitis s/p IR drainage. Patient was feeling fatigued and developed nausea last 24 hours and went [facility] to be evaluated. CT imaging revealed return of his intra-abdominal fluid collection in the right lower quadrant. This is compared to his CT scan after drain was removed at the end of March. Patient however, is not complaining of any abdominal pain. Patient states he had 1 episode of diarrhea last Friday but has otherwise had normal bowel movements. Was tolerating a diet up until the last 24 hours. No peritoneal signs on exam.  Mild tachycardia to 104 normotensive. Patient does have a leukocytosis to 20.6. Past Medical History:   Diagnosis Date    Appendicitis with abscess     appendix not removed as of 5/25/22    Diabetes mellitus ([facility]     Gangrene of toe of left foot ([facility] 11/17/2017    Hyperlipidemia     Hyperlipidemia     Sleep apnea     Vitamin D deficiency        Past Surgical History:   Procedure Laterality Date    OTHER SURGICAL HISTORY Left 11/17/2017    Amputation of left great toe.     TOE AMPUTATION      TOE AMPUTATION Left 10/21/2019    LEFT SECOND TOE AMPUTATION performed by Karthik Sanchez DPM at [address] [place] [address] Facility-Administered Medications   Medication Dose Route Frequency Provider Last Rate Last Admin    sodium chloride flush 0.9 % injection 5-40 mL  5-40 mL IntraVENous 2 times per day Mike Morrison MD        sodium chloride flush 0.9 % injection 5-40 mL  5-40 mL IntraVENous PRKAMALA Morrison MD        0.9 % sodium chloride infusion   IntraVENous NACHO Morrison MD        polyethylene glycol (GLYCOLAX) packet 17 g  17 g Oral Daily PRN Julianna Chavis MD        acetaminophen (TYLENOL) tablet 650 mg  650 mg Oral Q6H PRN Julianna Chavis MD        Or   Donell Grossman acetaminophen (TYLENOL) suppository 650 mg  650 mg Rectal Q6H PRN Julianna Chavis MD        piperacillin-tazobactam (ZOSYN) 3,375 mg in dextrose 5 % 100 mL IVPB extended infusion (mini-bag)  3,375 mg IntraVENous Q8H Gianmarino C Gianfrate, DO        sodium chloride flush 0.9 % injection 10 mL  10 mL IntraVENous 2 times per day Gianmarino C Gianfrate, DO        sodium chloride flush 0.9 % injection 10 mL  10 mL IntraVENous PRN Gianmarino C Gianfrate, DO        0.9 % sodium chloride infusion   IntraVENous PRN Gianmarino C Gianfrate, DO        ondansetron (ZOFRAN-ODT) disintegrating tablet 4 mg  4 mg Oral Q8H PRN Gianmarino C Gianfrate, DO        Or    ondansetron (ZOFRAN) injection 4 mg  4 mg IntraVENous Q6H PRN Gianmarino C Gianfrate, DO        enoxaparin Sodium (LOVENOX) injection 30 mg  30 mg SubCUTAneous BID Gianmarino C Gianfrate, DO   30 mg at 05/26/22 0022    glucose chewable tablet 16 g  4 tablet Oral PRN Gianmarino C Gianfrate, DO        glucagon (rDNA) injection 1 mg  1 mg IntraMUSCular PRN Gianmarino C Gianfrate, DO        atorvastatin (LIPITOR) tablet 40 mg  40 mg Oral Daily Gianmarino C Gianfrate, DO   40 mg at 05/26/22 0023    lisinopril (PRINIVIL;ZESTRIL) tablet 5 mg  5 mg Oral Daily Gianmarino C Gianfrate, DO        insulin regular (HUMULIN R;NOVOLIN R) 100 Units in sodium chloride 0.9 % 100 mL infusion  0.1 Units/kg/hr IntraVENous Brissa Duenas DO 5.9 mL/hr at 05/26/22 0500 5.88 Units/hr at 05/26/22 0500    dextrose bolus 10% 125 mL  125 mL IntraVENous PRN June Prier Sieger, DO        Or    dextrose bolus 10% 250 mL  250 mL IntraVENous PRN Duayne Bash, DO        potassium chloride 10 mEq/100 mL IVPB (Peripheral Line)  10 mEq IntraVENous PRN Duayne Bash, DO        magnesium sulfate 1000 mg in dextrose 5% 100 mL IVPB  1,000 mg IntraVENous PRN Los Martine, DO        sodium phosphate 10 mmol in sodium chloride 0.9 % 250 mL IVPB  10 mmol IntraVENous PRN Los Martine, DO        Or    sodium phosphate 15 mmol in dextrose 5 % 250 mL IVPB  15 mmol IntraVENous PRN Los Martine, DO        Or    sodium phosphate 20 mmol in dextrose 5 % 500 mL IVPB  20 mmol IntraVENous PRN Los Martine, DO        dextrose 5 % and 0.45 % sodium chloride infusion   IntraVENous Continuous PRN Los Martine,  mL/hr at 05/26/22 0358 New Bag at 05/26/22 0358    0.9 % sodium chloride infusion   IntraVENous Continuous Jerry Mins Sieger, DO        sodium bicarbonate 150 mEq in sterile water 1,000 mL infusion   IntraVENous Continuous Los Martine,  mL/hr at 05/26/22 0444 New Bag at 05/26/22 0444       No Known Allergies    Family History   Problem Relation Age of Onset    Depression Mother     Cancer Mother     Atrial Fibrillation Mother     Depression Father     Cancer Father     Depression Maternal Grandmother        Social History     Socioeconomic History    Marital status:      Spouse name: Not on file    Number of children: Not on file    Years of education: Not on file    Highest education level: Not on file   Occupational History    Not on file   Tobacco Use    Smoking status: Never Smoker    Smokeless tobacco: Never Used   Substance and Sexual Activity    Alcohol use: No    Drug use: No    Sexual activity: Never   Other Topics Concern    Not on file   Social History Narrative    Not on file     Social Determinants of Health     Financial Resource Strain: Medium Risk    Difficulty of Paying Living Expenses: Somewhat hard   Food Insecurity: No Food Insecurity    Worried About Running Out of Food in the Last Year: Never true    Suraj of Food in the Last Year: Never true   Transportation Needs:     Lack of Transportation (Medical): Not on file    Lack of Transportation (Non-Medical):  Not on file   Physical Activity:     Days of Exercise per Week: Not on file    Minutes of Exercise per Session: Not on file   Stress:     Feeling of Stress : Not on file   Social Connections:     Frequency of Communication with Friends and Family: Not on file    Frequency of Social Gatherings with Friends and Family: Not on file    Attends Cheondo[employer] Services: Not on file    Active Member of [address] [place] [address] or Organizations: Not on file    Attends Club or Organization Meetings: Not on file    Marital Status: Not on file   Intimate Partner Violence:     Fear of Current or Ex-Partner: Not on file    Emotionally Abused: Not on file    Physically Abused: Not on file    Sexually Abused: Not on file   Housing Stability:     Unable to Pay for Housing in the Last Year: Not on file    Number of [place] in the Last Year: Not on file    Unstable Housing in the Last Year: Not on file       ROS:   ROS:   Review of Systems   Constitutional: Positive for fatigue and fever. Negative for activity change, appetite change and chills. HENT: Negative for congestion, dental problem, drooling and ear discharge. Eyes: Negative for pain, discharge and itching. Respiratory: Negative for apnea, choking and chest tightness. Cardiovascular: Negative for chest pain, palpitations and leg swelling. Gastrointestinal: Negative for abdominal distention, abdominal pain and anal bleeding. Endocrine: Negative for cold intolerance, heat intolerance and polydipsia. Genitourinary: Negative for difficulty urinating, dysuria and enuresis. Musculoskeletal: Negative for arthralgias, back pain, gait problem and joint swelling. Skin: Negative for color change, pallor, rash and wound. Allergic/Immunologic: Negative for environmental allergies, food allergies and immunocompromised state. Neurological: Negative for dizziness, facial asymmetry and headaches. Hematological: Negative for adenopathy. Does not bruise/bleed easily. Psychiatric/Behavioral: Negative for agitation, behavioral problems and confusion. Physical Exam:  Vitals:    05/26/22 0400   BP: 97/63   Pulse: 89   Resp: 20   Temp: (!) 96.7 °F (35.9 °C)   SpO2: 98%       PSYCH: mood and affect normal, alert and oriented x 3: No apparent distress, comfortable  EYES: Sclera white, pupils equal round and reactive to light  ENMT:  Hearing normal, trachea midline, ears externally intact  LYMPH: no obvious lympadenopathy in neck. RESP: Respiratory effort was normal with no retractions or use of accessory muscles. CV:  No pedal edema  GI/ Abdomen: Soft, nondistended, nontender, no guarding, no peritoneal signs  MSK: no clubbing/ no cyanosis/ gaitnormal     Assessment/Plan:  Leatha Goodpasture a 64 y. o. male with recurrent large right lower quadrant abscess concerning for perforated appendicitis vs diverticular disease s/p drain in March      Admit to ICU  ABX-- will consult ID due to refractory abscess   NPO, IVF  Hyperglycemia per primary   Daily labs, INR  Will plan for IR drainage of abscess  Pt will need to follow up after drain is removed and acute inflammation is decreased for colonoscopy.    Currently will attempt non- Operative intervention  Pending colonoscopy patient may need operative intervention on a semi-elective basis outpatient      Electronically signed by Louann Gunter DO on 5/25/2022 at 4:32 PM

## 2022-05-26 NOTE — PROGRESS NOTES
GENERAL SURGERY  DAILY PROGRESS NOTE  5/26/2022    Cc: DKA    Subjective:  No events overnight. No fevers chills nausea or vomiting. Not much abdominal pain at all    Objective:  BP (!) 82/53   Pulse 84   Temp (!) 96.7 °F (35.9 °C) (Temporal)   Resp 19   Ht 6' 2\" (1.88 m)   Wt 269 lb 1.6 oz (122.1 kg)   SpO2 97%   BMI 34.55 kg/m²     General appearance: alert, cooperative and in no acute distress. Eyes: grossly normal  Lungs: nonlabored breathing on room air  Heart: regular rate  Abdomen: soft, non-tender, non distended  Skin: No skin abnormalities  Neurologic: Alert and oriented x 3.  Grossly normal  Musculoskeletal: No clubbing cyanosis    Assessment/Plan:  64 y.o. male with history of RLQ abscess s/p IR drain and prolonged antibiotics in March, with recurrent abscess most likely perf appendicitis or diverticulitis, also DKA    IV antibiotics - consult ID who has seen patient previously  IR drain  OK for diet from surgery standpoint after drain  Patient needs to have colonoscopy after acute inflammation resolved and his drain is out    Electronically signed by Rosemary Reyna MD on 5/26/2022 at 7:46 AM     As above

## 2022-05-26 NOTE — CARE COORDINATION
5/26:  Transition of care:  Pt presented to the ER for generalized fatigue, abdominal pain, diarrhea, nausea & emesis beginning 4 days prior. Pt was found to be in DKA & transferred to MICU. Pt is on room air, Iv NaBicarb gtt, Insuling gtt & Iv Zosyn. Consult to [facility]. Ir consult for drainage of intra-abdominal abscess. Per GS most likely perf appendicitis or diverticulitis. Cm met with pt bedside today. Pt's PCP is Dr Diann Valentine on [facility]. Pt lives with his wife in a house with 3 steps to enter. The bed/bathroom are on the 1st floor. PTA pt was independent, has a Cpap & glucometer at home. Pt states he treats his DM with SQ injections. Pt's dc plan is to return home. No needs at this time. Pt has declined any HHC. Cm offered a [address] list, pt declined. Pt has used [facility] in the past.  Pt's family can transport him home on dc. Sw/Cm will continue to follow for dc planning. Electronically signed by Terrance Rojas RN on 5/26/2022 at 1:38 PM   The Plan for Transition of Care is related to the following treatment goals: [address]    The Patient and/or patient representative  was provided with a choice of provider and agrees   with the discharge plan. [x] Yes [] No    Freedom of choice list was provided with basic dialogue that supports the patient's individualized plan of care/goals, treatment preferences and shares the quality data associated with the providers.  [x] Yes [] No

## 2022-05-26 NOTE — PROGRESS NOTES
Called floor and spoke to bedside RN. Confirmed that patient has been NPO and is able to sign own consent. Requested that SQ Lovenox be held until after RLQ drain placement. Will continue to keep floor updated when sending for patient.

## 2022-05-26 NOTE — PROGRESS NOTES
Spoke to [facility] regarding insulin drip and lab results, orders to restart insulin drip and check BMP per orders. To call  with next BMP results.

## 2022-05-26 NOTE — CONSULTS
ENDOCRINOLOGY INITIAL CONSULTATION NOTE  Via Tele-Health Service       Date of admission: 5/25/2022  Date of service: 5/26/2022  Admitting physician: Edson Qureshi DO   Primary Care Physician: Patricia Gooden MD  Consultant physician: Catia Sheikh MD     Reason for the consultation:  Uncontrolled DM    History of Present Illness: The history is provided by the patient. Accuracy of the patient data is excellent    Rebecca Freeman is a very pleasant 64 y.o. old male with PMH of Obesity, DM type 2, HLD and other listed below admitted to [facility] on 5/25/2022 because of abdominal pain,  nausea, vomiting and fatigue , endocrine service was consulted for diabetes management. Patient denies fever, chills, abdominal pain, chest pain, sob and dysuria  CT abdomen showed fluid accumulation in RLQ concerning for recurrence of RLQ abdominal abscess    Prior to admission  The patient was diagnosed with type 2 DM long time ago and prior to admission patient was on Lantus 60u daily. Jardiance 10 mg daily. Patient has had no hypoglycemic episodes. Patient has not been eating consistent carbohydrate meals, self-blood glucose monitoring has been above goal prior to admission. In addition, patient reports  microvascular complications.  The patient is overdue with yearly diabetic eye exam.   Lab Results   Component Value Date    LABA1C 11.5 03/06/2022     Point of care glucose monitoring   (Independently reviewed)   Recent Labs     05/26/22  0621 05/26/22  0655 05/26/22  0821 05/26/22  0912 05/26/22  1010 05/26/22  1104 05/26/22  1200 05/26/22  1202   GLUMET 182* 200* 174* 185* 174* 213* 270* 232*       Past medical history:   Past Medical History:   Diagnosis Date    Appendicitis with abscess     appendix not removed as of 5/25/22    Diabetes mellitus ([address]     Gangrene of toe of left foot ([address] 11/17/2017    Hyperlipidemia     Hyperlipidemia     Sleep apnea     Vitamin D deficiency        Past surgical history:  Past Surgical History:   Procedure Laterality Date    OTHER SURGICAL HISTORY Left 11/17/2017    Amputation of left great toe.  TOE AMPUTATION      TOE AMPUTATION Left 10/21/2019    LEFT SECOND TOE AMPUTATION performed by Jayleen Goldman DPM at [address] [facility] [address] history:   Tobacco:   reports that he has never smoked. He has never used smokeless tobacco.  Alcohol:   reports no history of alcohol use. Drugs:   reports no history of drug use.     Family history:    Family History   Problem Relation Age of Onset    Depression Mother     Cancer Mother     Atrial Fibrillation Mother     Depression Father     Cancer Father     Depression Maternal Grandmother        Allergy and drug reactions:   No Known Allergies    Scheduled Meds:   sodium chloride flush  5-40 mL IntraVENous 2 times per day    potassium bicarb-citric acid  20 mEq Oral Once    potassium phosphate IVPB  20 mmol IntraVENous Once    potassium chloride  10 mEq IntraVENous Q1H    piperacillin-tazobactam  3,375 mg IntraVENous Q8H    [Held by provider] enoxaparin  30 mg SubCUTAneous BID    atorvastatin  40 mg Oral Daily    lisinopril  5 mg Oral Daily       PRN Meds:   sodium chloride flush, 5-40 mL, PRN  sodium chloride, , PRN  polyethylene glycol, 17 g, Daily PRN  acetaminophen, 650 mg, Q6H PRN   Or  acetaminophen, 650 mg, Q6H PRN  ondansetron, 4 mg, Q8H PRN   Or  ondansetron, 4 mg, Q6H PRN  glucose, 4 tablet, PRN  glucagon (rDNA), 1 mg, PRN  dextrose bolus, 125 mL, PRN   Or  dextrose bolus, 250 mL, PRN  potassium chloride, 10 mEq, PRN  magnesium sulfate, 1,000 mg, PRN  sodium phosphate IVPB, 10 mmol, PRN   Or  sodium phosphate IVPB, 15 mmol, PRN   Or  sodium phosphate IVPB, 20 mmol, PRN  dextrose 5 % and 0.45 % NaCl, , Continuous PRN      Continuous Infusions:   sodium chloride      insulin Stopped (05/26/22 0830)    dextrose 5 % and 0.45 % NaCl 150 mL/hr at 05/26/22 1101    sodium bicarbonate infusion 150 mL/hr at 05/26/22 5430       Review of Systems  All systems reviewed. All negative except for symptoms mentioned in HPI     OBJECTIVE    BP (!) 96/52   Pulse 83   Temp 98.4 °F (36.9 °C) (Temporal)   Resp 18   Ht 6' 2\" (1.88 m)   Wt 269 lb 1.6 oz (122.1 kg)   SpO2 97%   BMI 34.55 kg/m²     Intake/Output Summary (Last 24 hours) at 5/26/2022 1250  Last data filed at 5/26/2022 0600  Gross per 24 hour   Intake 3825 ml   Output 600 ml   Net 3225 ml     Physical examination:  Due to this being a TeleHealth encounter, evaluation of the following organ systems is limited: Vitals/Constitutional/EENT/Resp/CV/GI//MS/Neuro/Skin/Heme-Lymph-Imm. Modified physical exam through Telemedicine camera    General: Communicating well via camera   Neck: no obvious neck mass. No obvious neck deformity     CVS: no distress   Chest: no distress. Chest is moving with respiration    Extremities:  no visible tremor  Skin: No visible rashes as seen from camera   Musculoskeletal: no visible deformity  Neuro: Alert and oriented to person, place, and time. Psychiatric: Normal mood and affect.  Behavior is normal    Review of Laboratory Data:  I personally reviewed the following labs:   Recent Labs     05/25/22  0848 05/26/22  0300 05/26/22  0606   WBC 20.6* 15.2* 14.8*   RBC 5.35 4.58 4.74   HGB 15.6 13.0 13.6   HCT 46.7 40.3 41.3   MCV 87.3 88.0 87.1   MCH 29.2 28.4 28.7   MCHC 33.4 32.3 32.9   RDW 14.2 14.2 14.3    331 317   MPV 9.7 9.6 9.5     Recent Labs     05/25/22  0848 05/25/22  1130 05/25/22  2139 05/26/22  0300 05/26/22  0829   *   < > 130* 135 135   K 4.3   < > 4.8 3.3* 2.9*   CL 95*   < > 98 102 103   CO2 8*   < > 9* 13* 18*   BUN 17   < > 15 14 12   CREATININE 0.8   < > 0.6* 0.7 0.6*   GLUCOSE 225*   < > 240* 211* 170*   CALCIUM 9.5   < > 8.5* 8.2* 8.0*   PROT 8.8*  --   --   --   --    LABALBU 3.6  --   --   --   --    BILITOT 0.3  --   --   --   --    ALKPHOS 143*  --   --   --   --    AST 17  --   --   --   --    ALT 18  --   --   --   --     < > = values in this interval not displayed. Beta-Hydroxybutyrate   Date Value Ref Range Status   05/25/2022 >4.50 (H) 0.02 - 0.27 mmol/L Final   03/05/2022 1.30 (H) 0.02 - 0.27 mmol/L Final     Lab Results   Component Value Date    LABA1C 11.5 03/06/2022    LABA1C 13.0 11/23/2021    LABA1C 13.0 11/23/2021     Lab Results   Component Value Date/Time    TSH 1.300 03/07/2022 05:25 AM     Lab Results   Component Value Date    LABA1C 11.5 03/06/2022    GLUCOSE 170 05/26/2022    GLUCOSE 191 04/01/2011    MALBCR 101.7 03/29/2022    LABCREA 87.53 03/29/2022     Lab Results   Component Value Date    TRIG 153 08/10/2015    HDL 29 08/10/2015    LDLCALC 107 08/10/2015    CHOL 167 08/10/2015       Blood culture   Lab Results   Component Value Date    BC 5 Days- no growth 10/18/2019    BC 5 Days- no growth 11/17/2017       Radiology:  CT ABDOMEN PELVIS W IV CONTRAST Additional Contrast? None   Final Result   1. There has been reaccumulation of fluid in the right lower quadrant   extraperitoneal collection since prior examination with interval removal of   percutaneous drainage catheter. 2. Colonic diverticulosis without evidence of diverticulitis. CT CHEST WO CONTRAST   Final Result   1. Mildly prominent pulmonary artery, consider pulmonary artery hypertension. 2.  Wall thickening of the mid and distal esophagus, consider esophagitis. 3.  Lungs are clear. XR CHEST PORTABLE   Final Result   No acute process.          IR INTERVENTIONAL RADIOLOGY PROCEDURE REQUEST    (Results Pending)       Medical Records/Labs/Images review:   I personally reviewed and summarized previous records   All labs and imaging were reviewed independently     [address] Laniere, a 64 y.o.-old male seen today for inpatient diabetes management     Diabetes Mellitus type 2 admitted with DKA   · Patient's diabetes is uncontrolled, A1c 11.5%  · currently on insulin drip as per DKA protocol   · Once DKA resolve and pt ready for transition to sq insulin we recommend the following transition regimen   · Lantus 55 u daily   · Humalog high dose slidng scale Q4hrs   · Will titrate insulin dose based on the blood glucose trend & insulin requirement  · Will arrange for patient to be seen in endocrinology clinic upon discharge for routine diabetes maintenance and prevention. Recurrent Rt abdominal abscess   · Managed by primary and surgery service     The above issues were reviewed with the patient who understood and agreed with the plan. Thank you for allowing us to participate in the care of this patient. Please do not hesitate to contact us with any additional questions. Mari Akins MD  Endocrinologist, [facility]   [address] [place] [address]   Phone: [phone]  Fax: [phone]  --------------------------------------------  An electronic signature was used to authenticate this note.  Juanjo Seals MD on 5/26/2022 at 12:50 PM

## 2022-05-26 NOTE — PROGRESS NOTES
Call made to lab regarding no results for BMP, MG and Phos that were drawn and sent around 0830.  Stated she would look into the issue

## 2022-05-26 NOTE — PROGRESS NOTES
Hospitalist Progress Note      Synopsis: Patient is a 80-year-old male who presented for evaluation of fatigue and nausea. Patient has a past medical history significant for diabetes with associated comorbidities and a previous intra-abdominal abscess secondary to perforated appendicitis with a recent s/p IR drainage. Patient states he was feeling fatigued and developed nausea last 24 hours and went to [facility] ED for further evaluation. CT imaging revealed the return of his intra-abdominal fluid collection in the right lower quadrant. Patient states that he had 1 episode of diarrhea last week but has otherwise had normal bowel movements. He was tolerating a diet up until the last 24 hours when he developed nausea. Patient had labs significant for a beta hydroxybutyrate of 4.50, a glucose of 242, pH of 7.14 the patient was diagnosed with DKA and started on insulin infusion and was admitted to medical intensive care for further management. Hospital day 1     Subjective:  Seen and examined at bedside, patient remains on insulin drip. States abdominal pain and nausea has improved. Stable overnight. No issues reported. Patient seen and examined  Records reviewed. Temp (24hrs), Av.4 °F (36.3 °C), Min:96.7 °F (35.9 °C), Max:98.1 °F (36.7 °C)    DIET: Diet NPO Exceptions are: Sips of Water with Meds  CODE: Full Code    Intake/Output Summary (Last 24 hours) at 2022 0719  Last data filed at 2022 0600  Gross per 24 hour   Intake 3825 ml   Output 600 ml   Net 3225 ml       Review of Systems: All bolded are positive; please see HPI  General:  Fever, chills, diaphoresis, fatigue, malaise, night sweats, weight loss  Psychological:  Anxiety, disorientation, hallucinations. ENT:  Epistaxis, headaches, vertigo, visual changes. Cardiovascular:  Chest pain, irregular heartbeats, palpitations, paroxysmal nocturnal dyspnea.   Respiratory:  Shortness of breath, coughing, sputum production, hemoptysis, wheezing, orthopnea. Gastrointestinal:  Nausea, vomiting, diarrhea, heartburn, constipation, abdominal pain, hematemesis, hematochezia, melena, acholic stools  Genito-Urinary:  Dysuria, urgency, frequency, hematuria  Musculoskeletal:  Joint pain, joint stiffness, joint swelling, muscle pain  Neurology:  Headache, focal neurological deficits, weakness, numbness, paresthesia  Derm:  Rashes, ulcers, excoriations, bruising  Extremities:  Decreased ROM, peripheral edema, mottling    Objective:    BP (!) 82/53   Pulse 84   Temp (!) 96.7 °F (35.9 °C) (Temporal)   Resp 19   Ht 6' 2\" (1.88 m)   Wt 269 lb 1.6 oz (122.1 kg)   SpO2 97%   BMI 34.55 kg/m²     General appearance: Obese middle-aged man in no apparent distress, appears stated age and cooperative. Respiratory:  Clear to auscultation bilaterally. Normal respiratory effort. Cardiovascular:  RRR. S1, S2 without MRG. PV: Pulses palpable. No edema. Abdomen: Soft, non-tender, non-distended. +BS  Musculoskeletal: No obvious deformities. Skin: Normal skin color. No rashes or lesions. Good turgor. Neurologic:  Grossly non-focal. Awake, alert, following commands.    Psychiatric: Alert and oriented, thought content appropriate, normal insight and judgement    Medications:  REVIEWED DAILY    Infusion Medications    sodium chloride      sodium chloride      insulin 5.6 Units/hr (05/26/22 0700)    dextrose 5 % and 0.45 % NaCl 150 mL/hr at 05/26/22 0358    sodium chloride      sodium bicarbonate infusion 150 mL/hr at 05/26/22 2004     Scheduled Medications    sodium chloride flush  5-40 mL IntraVENous 2 times per day    potassium chloride  40 mEq IntraVENous Once    potassium bicarb-citric acid  20 mEq Oral Once    potassium phosphate IVPB  20 mmol IntraVENous Once    piperacillin-tazobactam  3,375 mg IntraVENous Q8H    sodium chloride flush  10 mL IntraVENous 2 times per day    enoxaparin  30 mg SubCUTAneous BID    atorvastatin  40 mg Oral Daily    lisinopril  5 mg Oral Daily     PRN Meds: sodium chloride flush, sodium chloride, polyethylene glycol, acetaminophen **OR** acetaminophen, sodium chloride flush, sodium chloride, ondansetron **OR** ondansetron, glucose, glucagon (rDNA), dextrose bolus **OR** dextrose bolus, potassium chloride, magnesium sulfate, sodium phosphate IVPB **OR** sodium phosphate IVPB **OR** sodium phosphate IVPB, dextrose 5 % and 0.45 % NaCl    Labs:     Recent Labs     05/25/22  0848 05/26/22  0300 05/26/22  0606   WBC 20.6* 15.2* 14.8*   HGB 15.6 13.0 13.6   HCT 46.7 40.3 41.3    331 317       Recent Labs     05/25/22  1526 05/25/22  2139 05/26/22  0300    130* 135   K 4.5 4.8 3.3*    98 102   CO2 6* 9* 13*   BUN 16 15 14   CREATININE 0.6* 0.6* 0.7   CALCIUM 8.1* 8.5* 8.2*   PHOS  --  2.2* 1.2*       Recent Labs     05/25/22  0848 05/26/22  0300   PROT 8.8*  --    ALKPHOS 143*  --    ALT 18  --    AST 17  --    BILITOT 0.3  --    LIPASE  --  34       Recent Labs     05/25/22  2139   INR 1.2       Recent Labs     05/25/22  1242   CKTOTAL 100       Chronic labs:    Lab Results   Component Value Date    CHOL 167 08/10/2015    TRIG 153 (H) 08/10/2015    HDL 29 08/10/2015    LDLCALC 107 (H) 08/10/2015    TSH 1.300 03/07/2022    INR 1.2 05/25/2022    LABA1C 11.5 (H) 03/06/2022       Radiology: REVIEWED DAILY    Assessment:  · Intra-abdominal abscess  · Sepsis, secondary to intra-abdominal abscess  · Diabetic ketoacidosis  · Leukocytosis WBC 14.8 today  · Hypertension  · Hyperlipidemia  Plan:  · Consult critical care  · General surgery following  · Infectious disease following  · Zosyn and vancomycin  · Blood cultures pending  · IR consult for abscess drainage  · Insulin infusion per DKA protocol  · IV fluids per DKA protocol  · Monitor serum electrolytes  · N.p.o.  · Continue home medications      DVT Prophylaxis [x] Lovenox  []  Heparin [] DOAC [] PCDs [] Ambulation    GI Prophylaxis [x] PPI  [] H2 Blocker [] Carafate  [] Diet/Tube Feeds   Level of care [] Med/Surg  [] Intermediate  [x]  ICU   Diet Diet NPO Exceptions are: Sips of Water with Meds    Family contact []  N/A    [] At bedside  [] Phone call   Disposition Patient requires continued admission further management of intra-abdominal abscess and DKA   MDM [] Low    [] Moderate  []   High       Discharge Plan: Pending clinical improvement    +++++++++++++++++++++++++++++++++++++++++++++++++  Joce Thomason[address]  +++++++++++++++++++++++++++++++++++++++++++++++++  NOTE: This report was transcribed using voice recognition software. Every effort was made to ensure accuracy; however, inadvertent computerized transcription errors may be present.

## 2022-05-26 NOTE — CONSULTS
[facility] CONSULT NOTE    Reason for Consult: Intraabdominal abscess    Requested by: Melba Barba DO     Chief complaint: Fatigue    History Obtained From: Patient and EMR    HISTORY Kurt              The patient is a 64 y.o. 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 Robinson Dang and given a course of piperacillin-tazobactam until 10 days after drain removal on 03/23 then cefdinir, presented on 05/25 with fatigue accompanied by abdominal pain and diarrhea for 4 days, found to be in DKA and sepsis with leukocytosis of 20,000, CT abdomen and pelvis showing fluid reaccumulation in the right lower quadrant extraperitoneal collection. CT chest showed clear lungs. He received a dose of vancomycin on admission. Piperacillin-tazobactam was started on admission. ID service was subsequently consulted for further recommendations.     Past Medical History  Past Medical History:   Diagnosis Date    Appendicitis with abscess     appendix not removed as of 5/25/22    [address] [address]     Gangrene of toe of left foot ([facility] [address] 11/17/2017    Hyperlipidemia     Hyperlipidemia     Sleep apnea     Vitamin D deficiency        Current Facility-Administered Medications   Medication Dose Route Frequency Provider Last Rate Last Admin    sodium chloride flush 0.9 % injection 5-40 mL  5-40 mL IntraVENous 2 times per day Shon Bailey MD        sodium chloride flush 0.9 % injection 5-40 mL  5-40 mL IntraVENous PRN Shon Bailey MD        0.9 % sodium chloride infusion   IntraVENous PRN Shon Bailey MD        polyethylene glycol (GLYCOLAX) packet 17 g  17 g Oral Daily PRN MD Dharmesh Villa Or    dextrose bolus 10% 250 mL  250 mL IntraVENous PRN Bureau Kaelyn, DO        potassium chloride 10 mEq/100 mL IVPB (Peripheral Line)  10 mEq IntraVENous PRN Bureau Kaelyn, DO        magnesium sulfate 1000 mg in dextrose 5% 100 mL IVPB  1,000 mg IntraVENous PRN Lalo Kaelyn, DO        sodium phosphate 10 mmol in sodium chloride 0.9 % 250 mL IVPB  10 mmol IntraVENous PRN Bureau Kaelyn, DO        Or    sodium phosphate 15 mmol in dextrose 5 % 250 mL IVPB  15 mmol IntraVENous PRN Bureau Kaelyn, DO        Or    sodium phosphate 20 mmol in dextrose 5 % 500 mL IVPB  20 mmol IntraVENous PRN Bureau Kaelyn, DO        dextrose 5 % and 0.45 % sodium chloride infusion   IntraVENous Continuous PRN Bureau Kaelyn,  mL/hr at 05/26/22 0358 New Bag at 05/26/22 0358    sodium bicarbonate 150 mEq in sterile water 1,000 mL infusion   IntraVENous Continuous Guerrero Sieger,  mL/hr at 05/26/22 0444 New Bag at 05/26/22 0444       No Known Allergies    Surgical History  Past Surgical History:   Procedure Laterality Date    OTHER SURGICAL HISTORY Left 11/17/2017    Amputation of left great toe.     TOE AMPUTATION      TOE AMPUTATION Left 10/21/2019    LEFT SECOND TOE AMPUTATION performed by Jeff Butts DPM at [facility] History  Social History     Socioeconomic History    Marital status:    Tobacco Use    Smoking status: Never Smoker    Smokeless tobacco: Never Used   Substance and Sexual Activity    Alcohol use: No    Drug use: No    Sexual activity: Never       Family Medical History  Family History   Problem Relation Age of Onset    Depression Mother     Cancer Mother     Atrial Fibrillation Mother     Depression Father     Cancer Father     Depression Maternal Grandmother        Review of Systems:  Constitutional: No fever, had chills  Eyes: No vision changes, no retroorbital pain  ENT: No hearing changes, no ear pain  Respiratory: No cough, no dyspnea  Cardiovascular: No chest pain, no palpitations  Gastrointestinal: No abdominal pain, had diarrhea  Genitourinary: No dysuria, no hematuria  Integumentary: No rash, no itching  Musculoskeletal: No muscle pain, no joint pain  Neurologic: No headache, has numbness in feet    Physical Examination:  Vitals:    05/26/22 0500 05/26/22 0600 05/26/22 0700 05/26/22 0800   BP: 119/66 (!) 82/53 (!) 102/57 (!) 80/49   Pulse: 90 85 84 82   Resp: 19 17 19 16   Temp:    98.4 °F (36.9 °C)   TempSrc:    Temporal   SpO2: 96% 97%  97%   Weight:       Height:         Constitutional: Alert, not in distress  Eyes: Sclerae anicteric, no conjunctival erythema  ENT: No buccal lesion, no pharyngeal exudates  Neck: No nuchal rigidity, no cervical adenopathy  Lungs: Clear breath sounds, no crackles, no wheezes  Heart: Regular rate and rhythm, no murmurs  Abdomen: Bowel sounds present, soft, nontender  Skin: Warm and dry, no active dermatoses  Musculoskeletal: No joint erythema, no joint swelling    Labs, imaging, and medical records/notes were personally reviewed. Assessment:  Sepsis, secondary to intraabdominal abscess  DKA    Plan:  Continue piperacillin-tazobactam 3.375g q8h. Plan for percutaneous drain per Surgery is noted. Continue supportive care. Thank you for involving me in the care of "[employer]ve. I will continue to follow. Please do not hesitate to call for any questions or concerns.     Electronically signed by Rajat Pena MD on 5/26/2022 at 10:14 AM

## 2022-05-27 ENCOUNTER — APPOINTMENT (OUTPATIENT)
Dept: CT IMAGING | Age: 57
DRG: 720 | End: 2022-05-27
Payer: COMMERCIAL

## 2022-05-27 PROBLEM — E11.65 POORLY CONTROLLED TYPE 2 DIABETES MELLITUS (HCC): Status: ACTIVE | Noted: 2022-05-25

## 2022-05-27 LAB
ANION GAP SERPL CALCULATED.3IONS-SCNC: 12 MMOL/L (ref 7–16)
ANION GAP SERPL CALCULATED.3IONS-SCNC: 12 MMOL/L (ref 7–16)
ANION GAP SERPL CALCULATED.3IONS-SCNC: 14 MMOL/L (ref 7–16)
BASOPHILS ABSOLUTE: 0.07 E9/L (ref 0–0.2)
BASOPHILS RELATIVE PERCENT: 0.5 % (ref 0–2)
BUN BLDV-MCNC: 4 MG/DL (ref 6–20)
BUN BLDV-MCNC: 5 MG/DL (ref 6–20)
BUN BLDV-MCNC: 7 MG/DL (ref 6–20)
CALCIUM IONIZED: 1.2 MMOL/L (ref 1.15–1.33)
CALCIUM IONIZED: 1.2 MMOL/L (ref 1.15–1.33)
CALCIUM SERPL-MCNC: 8 MG/DL (ref 8.6–10.2)
CALCIUM SERPL-MCNC: 8 MG/DL (ref 8.6–10.2)
CALCIUM SERPL-MCNC: 8.1 MG/DL (ref 8.6–10.2)
CHLORIDE BLD-SCNC: 102 MMOL/L (ref 98–107)
CO2: 18 MMOL/L (ref 22–29)
CO2: 20 MMOL/L (ref 22–29)
CO2: 21 MMOL/L (ref 22–29)
CREAT SERPL-MCNC: 0.5 MG/DL (ref 0.7–1.2)
CREAT SERPL-MCNC: 0.5 MG/DL (ref 0.7–1.2)
CREAT SERPL-MCNC: 0.6 MG/DL (ref 0.7–1.2)
EOSINOPHILS ABSOLUTE: 0.08 E9/L (ref 0.05–0.5)
EOSINOPHILS RELATIVE PERCENT: 0.6 % (ref 0–6)
GFR AFRICAN AMERICAN: >60
GFR NON-AFRICAN AMERICAN: >60 ML/MIN/1.73
GLUCOSE BLD-MCNC: 213 MG/DL (ref 74–99)
GLUCOSE BLD-MCNC: 258 MG/DL (ref 74–99)
GLUCOSE BLD-MCNC: 279 MG/DL (ref 74–99)
HCT VFR BLD CALC: 38.9 % (ref 37–54)
HEMOGLOBIN: 13.2 G/DL (ref 12.5–16.5)
IMMATURE GRANULOCYTES #: 0.14 E9/L
IMMATURE GRANULOCYTES %: 1.1 % (ref 0–5)
LYMPHOCYTES ABSOLUTE: 0.74 E9/L (ref 1.5–4)
LYMPHOCYTES RELATIVE PERCENT: 5.7 % (ref 20–42)
MAGNESIUM: 2 MG/DL (ref 1.6–2.6)
MAGNESIUM: 2 MG/DL (ref 1.6–2.6)
MAGNESIUM: 2.1 MG/DL (ref 1.6–2.6)
MCH RBC QN AUTO: 28.6 PG (ref 26–35)
MCHC RBC AUTO-ENTMCNC: 33.9 % (ref 32–34.5)
MCV RBC AUTO: 84.2 FL (ref 80–99.9)
METER GLUCOSE: 189 MG/DL (ref 74–99)
METER GLUCOSE: 218 MG/DL (ref 74–99)
METER GLUCOSE: 240 MG/DL (ref 74–99)
METER GLUCOSE: 258 MG/DL (ref 74–99)
MONOCYTES ABSOLUTE: 1.1 E9/L (ref 0.1–0.95)
MONOCYTES RELATIVE PERCENT: 8.5 % (ref 2–12)
NEUTROPHILS ABSOLUTE: 10.75 E9/L (ref 1.8–7.3)
NEUTROPHILS RELATIVE PERCENT: 83.6 % (ref 43–80)
PDW BLD-RTO: 14.4 FL (ref 11.5–15)
PHOSPHORUS: 0.8 MG/DL (ref 2.5–4.5)
PHOSPHORUS: 1.1 MG/DL (ref 2.5–4.5)
PHOSPHORUS: 1.1 MG/DL (ref 2.5–4.5)
PHOSPHORUS: 1.4 MG/DL (ref 2.5–4.5)
PLATELET # BLD: 321 E9/L (ref 130–450)
PMV BLD AUTO: 9.3 FL (ref 7–12)
POTASSIUM SERPL-SCNC: 2.9 MMOL/L (ref 3.5–5)
POTASSIUM SERPL-SCNC: 3.3 MMOL/L (ref 3.5–5)
POTASSIUM SERPL-SCNC: 4 MMOL/L (ref 3.5–5)
RBC # BLD: 4.62 E12/L (ref 3.8–5.8)
SODIUM BLD-SCNC: 132 MMOL/L (ref 132–146)
SODIUM BLD-SCNC: 135 MMOL/L (ref 132–146)
SODIUM BLD-SCNC: 136 MMOL/L (ref 132–146)
WBC # BLD: 12.9 E9/L (ref 4.5–11.5)

## 2022-05-27 PROCEDURE — 99232 SBSQ HOSP IP/OBS MODERATE 35: CPT | Performed by: INTERNAL MEDICINE

## 2022-05-27 PROCEDURE — 99232 SBSQ HOSP IP/OBS MODERATE 35: CPT | Performed by: SURGERY

## 2022-05-27 PROCEDURE — 84100 ASSAY OF PHOSPHORUS: CPT

## 2022-05-27 PROCEDURE — 87205 SMEAR GRAM STAIN: CPT

## 2022-05-27 PROCEDURE — 2500000003 HC RX 250 WO HCPCS: Performed by: RADIOLOGY

## 2022-05-27 PROCEDURE — 97530 THERAPEUTIC ACTIVITIES: CPT

## 2022-05-27 PROCEDURE — 6360000002 HC RX W HCPCS: Performed by: STUDENT IN AN ORGANIZED HEALTH CARE EDUCATION/TRAINING PROGRAM

## 2022-05-27 PROCEDURE — 36415 COLL VENOUS BLD VENIPUNCTURE: CPT

## 2022-05-27 PROCEDURE — 1200000000 HC SEMI PRIVATE

## 2022-05-27 PROCEDURE — 2500000003 HC RX 250 WO HCPCS: Performed by: STUDENT IN AN ORGANIZED HEALTH CARE EDUCATION/TRAINING PROGRAM

## 2022-05-27 PROCEDURE — 2580000003 HC RX 258: Performed by: STUDENT IN AN ORGANIZED HEALTH CARE EDUCATION/TRAINING PROGRAM

## 2022-05-27 PROCEDURE — 2500000003 HC RX 250 WO HCPCS

## 2022-05-27 PROCEDURE — 2709999900 CT ABSCESS DRAINAGE W CATH PLACEMENT S&I

## 2022-05-27 PROCEDURE — 0W9G30Z DRAINAGE OF PERITONEAL CAVITY WITH DRAINAGE DEVICE, PERCUTANEOUS APPROACH: ICD-10-PCS | Performed by: INTERNAL MEDICINE

## 2022-05-27 PROCEDURE — 6370000000 HC RX 637 (ALT 250 FOR IP): Performed by: INTERNAL MEDICINE

## 2022-05-27 PROCEDURE — 87070 CULTURE OTHR SPECIMN AEROBIC: CPT

## 2022-05-27 PROCEDURE — 82962 GLUCOSE BLOOD TEST: CPT

## 2022-05-27 PROCEDURE — 75989 ABSCESS DRAINAGE UNDER X-RAY: CPT | Performed by: RADIOLOGY

## 2022-05-27 PROCEDURE — 2580000003 HC RX 258

## 2022-05-27 PROCEDURE — S5553 INSULIN LONG ACTING 5 U: HCPCS | Performed by: INTERNAL MEDICINE

## 2022-05-27 PROCEDURE — 87186 SC STD MICRODIL/AGAR DIL: CPT

## 2022-05-27 PROCEDURE — 85025 COMPLETE CBC W/AUTO DIFF WBC: CPT

## 2022-05-27 PROCEDURE — 97165 OT EVAL LOW COMPLEX 30 MIN: CPT

## 2022-05-27 PROCEDURE — 80048 BASIC METABOLIC PNL TOTAL CA: CPT

## 2022-05-27 PROCEDURE — 87077 CULTURE AEROBIC IDENTIFY: CPT

## 2022-05-27 PROCEDURE — 97161 PT EVAL LOW COMPLEX 20 MIN: CPT

## 2022-05-27 PROCEDURE — 83735 ASSAY OF MAGNESIUM: CPT

## 2022-05-27 PROCEDURE — 82330 ASSAY OF CALCIUM: CPT

## 2022-05-27 PROCEDURE — 87075 CULTR BACTERIA EXCEPT BLOOD: CPT

## 2022-05-27 RX ORDER — INSULIN GLARGINE-YFGN 100 [IU]/ML
55 INJECTION, SOLUTION SUBCUTANEOUS NIGHTLY
Status: DISCONTINUED | OUTPATIENT
Start: 2022-05-27 | End: 2022-05-29

## 2022-05-27 RX ORDER — POTASSIUM CHLORIDE 7.45 MG/ML
10 INJECTION INTRAVENOUS
Status: ACTIVE | OUTPATIENT
Start: 2022-05-27 | End: 2022-05-27

## 2022-05-27 RX ORDER — INSULIN LISPRO 100 [IU]/ML
0-18 INJECTION, SOLUTION INTRAVENOUS; SUBCUTANEOUS EVERY 4 HOURS
Status: DISCONTINUED | OUTPATIENT
Start: 2022-05-27 | End: 2022-05-27

## 2022-05-27 RX ORDER — INSULIN LISPRO 100 [IU]/ML
0-30 INJECTION, SOLUTION INTRAVENOUS; SUBCUTANEOUS EVERY 4 HOURS
Status: DISCONTINUED | OUTPATIENT
Start: 2022-05-27 | End: 2022-05-27

## 2022-05-27 RX ORDER — INSULIN LISPRO 100 [IU]/ML
0-18 INJECTION, SOLUTION INTRAVENOUS; SUBCUTANEOUS
Status: DISCONTINUED | OUTPATIENT
Start: 2022-05-27 | End: 2022-05-31 | Stop reason: HOSPADM

## 2022-05-27 RX ORDER — LIDOCAINE HYDROCHLORIDE 20 MG/ML
INJECTION, SOLUTION INFILTRATION; PERINEURAL
Status: COMPLETED | OUTPATIENT
Start: 2022-05-27 | End: 2022-05-27

## 2022-05-27 RX ORDER — INSULIN LISPRO 100 [IU]/ML
0-9 INJECTION, SOLUTION INTRAVENOUS; SUBCUTANEOUS NIGHTLY
Status: DISCONTINUED | OUTPATIENT
Start: 2022-05-27 | End: 2022-05-31 | Stop reason: HOSPADM

## 2022-05-27 RX ORDER — INSULIN LISPRO 100 [IU]/ML
12 INJECTION, SOLUTION INTRAVENOUS; SUBCUTANEOUS
Status: DISCONTINUED | OUTPATIENT
Start: 2022-05-27 | End: 2022-05-31 | Stop reason: HOSPADM

## 2022-05-27 RX ADMIN — INSULIN GLARGINE-YFGN 55 UNITS: 100 INJECTION, SOLUTION SUBCUTANEOUS at 21:11

## 2022-05-27 RX ADMIN — DEXTROSE AND SODIUM CHLORIDE: 5; 450 INJECTION, SOLUTION INTRAVENOUS at 00:18

## 2022-05-27 RX ADMIN — Medication 10 ML: at 13:43

## 2022-05-27 RX ADMIN — Medication 10 MEQ: at 06:34

## 2022-05-27 RX ADMIN — Medication 10 MEQ: at 21:06

## 2022-05-27 RX ADMIN — INSULIN LISPRO 3 UNITS: 100 INJECTION, SOLUTION INTRAVENOUS; SUBCUTANEOUS at 21:10

## 2022-05-27 RX ADMIN — PIPERACILLIN AND TAZOBACTAM 3375 MG: 3; .375 INJECTION, POWDER, LYOPHILIZED, FOR SOLUTION INTRAVENOUS at 16:37

## 2022-05-27 RX ADMIN — DEXTROSE AND SODIUM CHLORIDE: 5; 450 INJECTION, SOLUTION INTRAVENOUS at 08:59

## 2022-05-27 RX ADMIN — SODIUM PHOSPHATE, MONOBASIC, MONOHYDRATE 10 MMOL: 276; 142 INJECTION, SOLUTION INTRAVENOUS at 23:09

## 2022-05-27 RX ADMIN — PIPERACILLIN AND TAZOBACTAM 3375 MG: 3; .375 INJECTION, POWDER, LYOPHILIZED, FOR SOLUTION INTRAVENOUS at 08:44

## 2022-05-27 RX ADMIN — POTASSIUM CHLORIDE 10 MEQ: 7.46 INJECTION, SOLUTION INTRAVENOUS at 05:14

## 2022-05-27 RX ADMIN — POTASSIUM CHLORIDE 10 MEQ: 7.46 INJECTION, SOLUTION INTRAVENOUS at 02:44

## 2022-05-27 RX ADMIN — POTASSIUM PHOSPHATE, MONOBASIC AND POTASSIUM PHOSPHATE, DIBASIC 30 MMOL: 224; 236 INJECTION, SOLUTION, CONCENTRATE INTRAVENOUS at 02:56

## 2022-05-27 RX ADMIN — Medication 10 MEQ: at 23:08

## 2022-05-27 RX ADMIN — SODIUM PHOSPHATE, MONOBASIC, MONOHYDRATE 20 MMOL: 276; 142 INJECTION, SOLUTION INTRAVENOUS at 09:31

## 2022-05-27 RX ADMIN — ENOXAPARIN SODIUM 30 MG: 100 INJECTION SUBCUTANEOUS at 21:10

## 2022-05-27 RX ADMIN — INSULIN LISPRO 6 UNITS: 100 INJECTION, SOLUTION INTRAVENOUS; SUBCUTANEOUS at 13:45

## 2022-05-27 RX ADMIN — LIDOCAINE HYDROCHLORIDE 10 ML: 20 INJECTION, SOLUTION INFILTRATION; PERINEURAL at 10:42

## 2022-05-27 RX ADMIN — POTASSIUM CHLORIDE 10 MEQ: 7.46 INJECTION, SOLUTION INTRAVENOUS at 04:06

## 2022-05-27 RX ADMIN — Medication 10 MEQ: at 18:42

## 2022-05-27 RX ADMIN — Medication 10 MEQ: at 03:30

## 2022-05-27 RX ADMIN — INSULIN LISPRO 9 UNITS: 100 INJECTION, SOLUTION INTRAVENOUS; SUBCUTANEOUS at 06:42

## 2022-05-27 RX ADMIN — INSULIN LISPRO 6 UNITS: 100 INJECTION, SOLUTION INTRAVENOUS; SUBCUTANEOUS at 02:42

## 2022-05-27 ASSESSMENT — PAIN SCALES - GENERAL
PAINLEVEL_OUTOF10: 0

## 2022-05-27 NOTE — PLAN OF CARE
Problem: Chronic Conditions and Co-morbidities  Goal: Patient's chronic conditions and co-morbidity symptoms are monitored and maintained or improved  Outcome: Progressing  Flowsheets (Taken 5/26/2022 2000)  Care Plan - Patient's Chronic Conditions and Co-Morbidity Symptoms are Monitored and Maintained or Improved: Monitor and assess patient's chronic conditions and comorbid symptoms for stability, deterioration, or improvement     Problem: Discharge Planning  Goal: Discharge to home or other facility with appropriate resources  Outcome: Progressing  Flowsheets (Taken 5/26/2022 2000)  Discharge to home or other facility with appropriate resources: Identify barriers to discharge with patient and caregiver     Problem: Skin/Tissue Integrity  Goal: Absence of new skin breakdown  Description: 1. Monitor for areas of redness and/or skin breakdown  2. Assess vascular access sites hourly  3. Every 4-6 hours minimum:  Change oxygen saturation probe site  4. Every 4-6 hours:  If on nasal continuous positive airway pressure, respiratory therapy assess nares and determine need for appliance change or resting period.   Outcome: Progressing     Problem: Safety - Adult  Goal: Free from fall injury  Outcome: Adequate for Discharge  Flowsheets (Taken 5/27/2022 0000)  Free From Fall Injury: Instruct family/caregiver on patient safety     Problem: ABCDS Injury Assessment  Goal: Absence of physical injury  Outcome: Adequate for Discharge  Flowsheets (Taken 5/27/2022 0000)  Absence of Physical Injury: Implement safety measures based on patient assessment     Problem: Pain  Goal: Verbalizes/displays adequate comfort level or baseline comfort level  Outcome: Adequate for Discharge

## 2022-05-27 NOTE — INTERVAL H&P NOTE
H&P Update    Patient's History and Physical  was reviewed. The patient appears likely to able to tolerate the procedure. Risk and benefits discussed including ultimate complications, possibly death and consent obtained. Patient and/family had the opportunity to ask questions. All questions were answered and patient/family wishes to proceed.      Luis Eduardo Dee, II

## 2022-05-27 NOTE — PROGRESS NOTES
Physical Therapy    Physical Therapy Initial Assessment     Name: Digna Cruz  : 1965  MRN: [id number]      Date of Service: 2022    Evaluating PT:  Elbert Dupont, PT, DPT  [id number]     Room #:  8360/6026-B  Diagnosis:  Intra-abdominal abscess ([facility] [K65.1]  DKA, type 2, not at goal [facility]) [E11.10]  PMHx/PSHx:  Appendicitis with abscess, DM, gangrene of toe of L foot, HLD, HTN, sleep apnea, vit D deficiency     Precautions:  Falls, Abdominal drain   Equipment Needs:  NA    SUBJECTIVE:    Pt lives with his wife in a 1 story apartment with 2 stairs and 1 rail to enter. Bedroom and bathroom are on the 1st level. Pt ambulated with no AD, works/drives, and independent PTA. OBJECTIVE:   Initial Evaluation  Date: 22 Treatment Short Term/ Long Term   Goals   AM-PAC 6 Clicks 50/75     Was pt agreeable to Eval/treatment? Yes      Does pt have pain? No c/o pain     Bed Mobility  Rolling: SBA  Supine to sit: Mod A  Sit to supine: NT  Scooting: SBA  Rolling: Independent   Supine to sit: Independent   Sit to supine: Independent   Scooting: Independent    Transfers Sit to stand: SBA  Stand to sit: SBA  Stand pivot: SBA with IV pole   Sit to stand: Independent   Stand to sit:  Independent   Stand pivot: Independent    Ambulation    150 feet with IV pole SBA  >300 feet with no AD Independent    Stair negotiation: ascended and descended  NT  >4 steps with 1 rail Modified Independent     ROM BUE:  Per OT eval   BLE:  WFL     Strength BUE:  Per OT eval   BLE:  5/5     Balance Sitting EOB:  SBA  Dynamic Standing:  SBA with IV pole   Sitting EOB:  Independent   Dynamic Standing:  Independent      Pt is A & O x 4  RASS:  0  CAM-ICU:  NT  Sensation:  Pt denies numbness and tingling to extremities  Edema:  Unremarkable    Vitals:  Blood Pressure at rest 121/72 mmHg  Blood Pressure post session - mmHg   Heart Rate at rest 85 bpm  Heart Rate post session 102 bpm    SPO2 at rest 95% on - SPO2 post session 97% on - Functional Status Score-Intensive Care Unit (FSS-ICU)   Rolling 5/7   Supine to sit transfer 3/7   Unsupported sitting  5/7   Sit to stand transfers 5/7   Ambulation 5/7   Total  23/35     Therapeutic Exercises:    BLE ROM    Patient education  Pt educated on PT role, safety during functional mobility    Patient response to education:   Pt verbalized understanding Pt demonstrated skill Pt requires further education in this area   Yes  Yes  no     ASSESSMENT:    Conditions Requiring Skilled Therapeutic Intervention:    []Decreased strength     []Decreased ROM  [x]Decreased functional mobility  [x]Decreased balance   [x]Decreased endurance   []Decreased posture  []Decreased sensation  []Decreased coordination   []Decreased vision  []Decreased safety awareness   []Increased pain       Comments:  Pt received supine and agreeable to PT evaluation with OT collaboration. Pt cleared for participation by RN prior to session. Vitals monitored during session. Required assistance of trunk to get to EOB. Multiple medical lines managed during activity. Pt held IV pole to steady himself during gait. Able to stand and ambulate in hallway with fair gait speed. Pt sat at EOB after ambulating. Bedside chair set up and pt ambulated around room to chair. Vitals stable. Pt left with call button in reach, lines attached, and needs met. Treatment:  Patient practiced and was instructed in the following treatment:     Bed mobility training - pt given verbal and tactile cues to facilitate proper sequencing and safety during rolling and supine>sit as well as provided with physical assistance to complete task     Gait training- pt was given verbal and tactile cues to facilitate safety/balance during ambulation as well as provided with physical assistance. Pt's/ family goals   1. Home     Prognosis is good for reaching above PT goals. Patient and or family understand(s) diagnosis, prognosis, and plan of care.   Yes PHYSICAL THERAPY PLAN OF CARE:    PT POC is established based on physician order and patient diagnosis     Referring provider/PT Order:    05/27/22 0845  PT eval and treat        Je Kinney MD   Diagnosis:  Intra-abdominal abscess ([address] [K65.1]  DKA, type 2, not at goal [facility]) [E11.10]  Specific instructions for next treatment:  Progress gait/stairs     Current Treatment Recommendations:     [x] Strengthening to improve independence with functional mobility   [] ROM to improve independence with functional mobility   [x] Balance Training to improve static/dynamic balance and to reduce fall risk  [x] Endurance Training to improve activity tolerance during functional mobility   [x] Transfer Training to improve safety and independence with all functional transfers   [x] Gait Training to improve gait mechanics, endurance and asses need for appropriate assistive device  [x] Stair Training in preparation for safe discharge home and/or into the community   [x] Positioning to prevent skin breakdown and contractures  [x] Safety and Education Training   [x] Patient/Caregiver Education   [] HEP  [] Other     PT long term treatment goals are located in above grid    Frequency of treatments: 2-5x/week x 1-2 weeks. Time in  1420  Time out  1440    Total Treatment Time  10 minutes     Evaluation Time includes thorough review of current medical information, gathering information on past medical history/social history and prior level of function, completion of standardized testing/informal observation of tasks, assessment of data and education on plan of care and goals.     CPT codes:  [x] Low Complexity PT evaluation 36578  [] Moderate Complexity PT evaluation 89108  [] High Complexity PT evaluation 73421  [] PT Re-evaluation 58765  [] Gait training 00588 -- minutes  [] Manual therapy [address] [facility] -- minutes  [x] Therapeutic activities 75160 10 minutes  [] Therapeutic exercises 79454 - minutes  [] Neuromuscular reeducation 95851 -- minutes     Dieudonne Baugh, PT, DPT  [id number]

## 2022-05-27 NOTE — BRIEF OP NOTE
Brief Postoperative Note    Kang Kam  YOB: 1965  [id number]    Pre-operative Diagnosis and Procedure: rlq abscess plan drain    Post-operative Diagnosis: Same    Anesthesia: Local    Estimated Blood Loss: < 10 cc    Surgeon: Shabana GATES     Complications: none    Specimen obtained: yes    Findings: none     Hieu Calles II, MD   5/27/2022 10:37 AM

## 2022-05-27 NOTE — PROGRESS NOTES
[address]       Date:2022                                                               Patient Name: Shayy Onofre  MRN: [id number]  : 1965  Room: [address] [place] [address]-A    Evaluating OT: RAFIA Rocha,  OTR/L; [id number]    Referring Provider: Mary Grace Melgar MD   Specific Provider Orders/Date: OT eval and treat (22)       Diagnosis: Intra-abdominal abscess ([facility] [K65.1]  DKA, type 2, not at goal [facility]) [E11.10]     Reason for admission: Pt admitted with intra-abdominal abscess and DKA. Surgery/Procedures: : percutaneous drain placement. Pertinent Medical History:    Past Medical History:   Diagnosis Date    Appendicitis with abscess     appendix not removed as of 22    Diabetes mellitus ([facility]     Gangrene of toe of left foot ([facility] 2017    Hyperlipidemia     Hyperlipidemia     Sleep apnea     Vitamin D deficiency         *Precautions:  Fall Risk, RLQ drain    Assessment of current deficits   [x] Functional mobility  [x]ADLs  [x] Strength               []Cognition   [x] Functional transfers   [] IADLs         [x] Safety Awareness   [x]Endurance   [] Fine Coordination        [] ROM     [] Vision/perception   []Sensation    []Gross Motor Coordination [x] Balance   [] Delirium                  []Motor Control     [] Communication    OT PLAN OF CARE   OT POC based on physician orders, patient diagnosis and results of clinical assessment.        Frequency/Duration: 1-3 days/wk for 1-2 weeks PRN    Specific OT Treatment Interventions to include:   * Instruction/training on adapted ADL techniques and AE recommendations to increase functional independence within precautions       * Training on energy conservation strategies, correct breathing pattern and techniques to improve independence/tolerance for self-care routine  * Functional transfer/mobility training/DME recommendations for increased independence, safety, and fall prevention  * Patient/Family education to increase follow through with safety techniques and functional independence  * Recommendation of environmental modifications for increased safety with functional transfers/mobility and ADLs  * Cognitive retraining/development of therapeutic activities to improve problem solving, judgement, memory, and attention for increased safety/participation in ADL/IADL tasks  * Sensory re-education to improve body/limb awareness, maintain/improve skin integrity, and improve hand/UE motor function  * Visual-perceptual training to improve environmental scanning, visual attention/focus, and oculomotor skills for increased safety/independence with functional transfers/mobility and ADLs  * Splinting/positioning for increased function, prevention of contractures, and improve skin integrity  * Therapeutic exercise to improve motor endurance, ROM, and functional strength for ADLs/functional transfers  * Therapeutic activities to facilitate/challenge dynamic balance, stand tolerance for increased safety and independence with ADLs  * Therapeutic activities to facilitate gross/fine motor skills for increased independence with ADLs  * Neuro-muscular re-education: facilitation of righting/equilibrium reactions, midline orientation, scapular stability/mobility, normalization of muscle tone, and facilitation of volitional active controled movement  * Positioning to improve skin integrity, interaction with environment and functional independence  * Delirium prevention/treatment  * Manual techniques for edema management    Recommended Adaptive Equipment: LB AE PRN, TBD     Home Living: Pt lives with wife  in a 1st floor apartment with 3 step(s) to enter and no rail(s); bed/bath on main floor. Bathroom setup: tub/shower, standard commode  Equipment owned: None    Prior Level of Function: Ind with ADLs;   Ind with IADLs. No AD for functional mobility. Driving: Yes  Occupation: Works in a hotel    Pain Level: pt c/o 0/10 pain this session    Cognition: A&O: 4/4; Follows multi step commands. Memory: G   Comprehension G   Problem solving: G   Judgement/safety: G               Communication skills: WFL           Vision: WFL                Glasses: Yes                                                   Hearing: WFL     RASS: 0  CAM-ICU: (NT) Delirium    UE Assessment:  Hand Dominance: Right [x]  Left []     ROM Strength   RUE  WFL 5/5   LUE WFL 5/5     Sensation: No c/o numbness/tingling in BUE extremities. Tone: WNL   Edema: Unremarkable. Functional Assessment:  AM-PAC Daily Activity Raw Score: 21/24   Initial Eval Status  Date: 5/27/22 Treatment Status  Date:  STGs = LTGs  Time frame: 7-14 days   Feeding Ind                      Ind       Grooming Ind                      Ind        UB dressing/bathing Ind                      Ind       LB dressing/bathing Min A  Pt limited in ROM to lower body d/t abdominal discomfort. Pt would benefit from education on LB AE for improved independence and comfort with lower body care. Mod I   With LB AE       Toileting SBA                      Ind     Bed Mobility  Supine to sit:   SBA    Sit to supine:   SBA                      Ind     Functional Transfers Sit to stand:   SBA    Stand to sit:   SBA    Stand Pivot:   SBA                      Ind     Functional Mobility SBA with no AD  For household distances. Ind        Balance Sitting:     Static: Ind    Dynamic: SBA  Standing: SBA  Sitting:     Static:  Ind    Dynamic:Ind  Standing: Ind                   Endurance/Activity Tolerance   good tolerance with light to moderate activity.                        WFL   Visual/  Perceptual   WFL                     Vitals:   HR at rest: 84 bpm HR at end of session: 93 bpm   Spo2 at rest: --% Spo2 at end of session 98%   BP at rest: 121/72 mmHg BP at end of session 124/94 mmHg       Treatment: OT treatment provided this date includes:  N/A    Line management and environmental modifications made prior to and end of session to ensure patient safety and to increase efficiency of session. Skilled monitoring of HR, O2 saturation, blood pressure and patient's response to activity performed throughout session. Comments: Pt case discussed in rounds, OK from RN to see patient. Upon arrival, patient supine in bed. Pt demo fair tolerance with good understanding of education/techniques. At end of session, patient seated upright in chair with call light within reach, all lines and tubes intact. Pt instructed on use of call light for assistance and fall prevention. Nursing present to provide information on transfer status. Patient presents with decreased ROM/strength, activity tolerance, dynamic balance, functional mobility limiting completion of ADLs and safety. Pt can benefit from continued skilled OT services to increase safety, functional independence and quality of life. Rehab Potential: Good for established goals    Patient / Family Goal: to return to PLOF    Patient and/or family were instructed/educated on diagnosis, prognosis/goals and plan of care. Patient demonstrated fair understanding. Evaluation Complexity: Low    Time In: 2:19p             Time Out: 2:33p         Total Treatment time: 0 min   Min Units   OT Eval Low 97165 x 1   OT Eval Medium 22408     OT Eval High 75398     OT Re-Eval [id number]     Therapeutic Ex 35962     Therapeutic Activities 46712     ADL/Self Care 69232     Orthotic Management 66509     Neuro Re-Ed 71272     Non-Billable Time        Evaluation time includes thorough review of current medical information, gathering information on past medical history/social history and prior level of function, completion of standardized testing/informal observation of tasks, assessment of data and development of POC/Goals.      Avril Celis, OTD,  OTR/L; [id number]

## 2022-05-27 NOTE — PROGRESS NOTES
GENERAL SURGERY  DAILY PROGRESS NOTE  5/27/2022    Cc: DKA    Subjective:  No acute issues, doing well. IR drain in place. Purulent output. Moved out of MICU     Objective:  BP (!) 95/52   Pulse 87   Temp 98 °F (36.7 °C) (Axillary)   Resp 23   Ht 6' 2\" (1.88 m)   Wt 269 lb 1.6 oz (122.1 kg)   SpO2 95%   BMI 34.55 kg/m²     General appearance: alert, cooperative and in no acute distress. Lungs: nonlabored breathing on room air  Heart: regular rate  Abdomen: soft, mild RLQtender, non distended. IR drain in RLQ-- purulent drainage   Neurologic: Alert and oriented x 3. Grossly normal  Musculoskeletal: No clubbing cyanosis    Assessment/Plan:  64 y.o. male with history of RLQ abscess s/p IR drain and prolonged antibiotics in March, with recurrent abscess most likely perf appendicitis or diverticulitis, also DKA    IV antibiotics-- per ID   Drain care-- will need [address] for the drain   Continue diet   Patient needs to have colonoscopy after acute inflammation resolved and his drain is out  No other acute surgical care needed.  Must follow up for drain care and colonoscopy     Electronically signed by Francia Watson DO on 5/27/2022 at 6:56 AM     General Surgery Progress Note  [facility]    Patient's Name/Date of Birth: Blanca Reyes / 1965    Date: Dayna 10, 2022     Surgeon: Leon Villalba MD    Chief Complaint: Right lower quadrant abscess    Patient Active Problem List   Diagnosis    Diabetic foot ulcer ([address]    Diabetes mellitus ([address]    Osteomyelitis of ankle or foot, left, acute ([address]    Cellulitis of foot    Diabetic arthropathy ([address]    Gangrene of toe of left foot ([address]    Morbid (severe) obesity due to excess calories ([address]    Pure hypertriglyceridemia    Diabetic foot infection ([address]    DVT prophylaxis    ISAIAS (acute kidney injury) ([address]    Intra-abdominal abscess ([address]    Vitamin D deficiency    PINEDA (obstructive sleep apnea)    DKA, type 2, not at goal [facility])    Peritoneal abscess (HCC)       Subjective: Denies pain, no fevers or chills. Tolerating p.o. however has no appetite. Had a bowel movement this morning. Objective:  /89   Pulse 86   Temp 97.4 °F (36.3 °C) (Oral)   Resp 16   Ht 6' 2\" (1.88 m)   Wt 288 lb 8 oz (130.9 kg)   SpO2 97%   BMI 37.04 kg/m²   Labs:  Recent Labs     06/09/22  1057   WBC 6.8   HGB 13.5   HCT 41.1     Lab Results   Component Value Date    CREATININE 0.6 (L) 06/09/2022    BUN 8 06/09/2022     06/09/2022    K 4.2 06/09/2022     06/09/2022    CO2 25 06/09/2022     No results for input(s): LIPASE, AMYLASE in the last 72 hours. CBC with Differential:    Lab Results   Component Value Date    WBC 6.8 06/09/2022    RBC 4.66 06/09/2022    HGB 13.5 06/09/2022    HCT 41.1 06/09/2022     06/09/2022    MCV 88.2 06/09/2022    MCH 29.0 06/09/2022    MCHC 32.8 06/09/2022    RDW 14.4 06/09/2022    LYMPHOPCT 24.6 06/09/2022    MONOPCT 8.6 06/09/2022    BASOPCT 1.2 06/09/2022    MONOSABS 0.59 06/09/2022    LYMPHSABS 1.68 06/09/2022    EOSABS 0.00 06/09/2022    BASOSABS 0.08 06/09/2022     BMP:    Lab Results   Component Value Date     06/09/2022    K 4.2 06/09/2022    K 3.4 03/11/2022     06/09/2022    CO2 25 06/09/2022    BUN 8 06/09/2022    LABALBU 3.3 06/09/2022    LABALBU 4.4 04/01/2011    CREATININE 0.6 06/09/2022    CALCIUM 8.8 06/09/2022    GFRAA >60 06/09/2022    LABGLOM >60 06/09/2022    GLUCOSE 276 06/09/2022    GLUCOSE 191 04/01/2011       General appearance:  NAD  Head: NCAT, PERRLA, EOMI, red conjunctiva  Neck: supple, no masses  Lungs: CTAB, equal chest rise bilateral  Heart: Reg rate  Abdomen: soft, nondistended, nontender.   Right lower quadrant drain in place with purulent material  Skin; no lesions  Gu: no cva tenderness  Extremities: extremities normal, atraumatic, no cyanosis or edema      Assessment/Plan:  Dayo De La Rosaman is a 64 y.o. male with recurrent right lower quadrant periappendiceal abscess status post IR drain    Drain output noted, purulent  Continue Zosyn  Antibiotics per infectious disease. Patient reports not having a PICC line placed the last time he had IR drainage of right lower quadrant abscess  Diet as tolerated  We discussed the need for follow up with colonoscopy in 6-8 weeks with or without interval appendectomy/ileocecectomy.       Nina Gonsalves MD  6/10/2022  6:21 PM

## 2022-05-27 NOTE — PROGRESS NOTES
[address] [facility]   Department of Internal Medicine   Internal Medicine Residency  MICU Progress Note    Patient:  Rylee Barrow 64 y.o. male   MRN: [id number]       Date of Service: 2022    Allergy: Patient has no known allergies. Subjective     Patient was seen and examined this morning at bedside in no acute distress. Overnight, no acute events noted. Patient was bridged from insulin drip to subq per endocrinology. He is scheduled for IR drain placement today. At this time no other acute issues, patient is stable for transfer from ICU. Objective     TEMPERATURE:  Current - Temp: 98 °F (36.7 °C); Max - Temp  Av °F (36.7 °C)  Min: 98 °F (36.7 °C)  Max: 98 °F (36.7 °C)  RESPIRATIONS RANGE: Resp  Av.1  Min: 16  Max: 29  PULSE RANGE: Pulse  Av.2  Min: 79  Max: 109  BLOOD PRESSURE RANGE:  Systolic (33INE), DJC:100 , Min:61 , PDV:410   ; Diastolic (21VPJ), TVO:00, Min:42, Max:70    PULSE OXIMETRY RANGE: SpO2  Av.9 %  Min: 92 %  Max: 98 %    I & O - 24hr:    Intake/Output Summary (Last 24 hours) at 2022 1337  Last data filed at 2022 1057  Gross per 24 hour   Intake 49107.41 ml   Output 2960 ml   Net 7326.41 ml     I/O last 3 completed shifts: In: 01325.4 [I.V.:04187.4; IV [id number]:0474]  Out: 0066 [Urine:4050] I/O this shift:  In: -   Out: 110 [Other:110]   Weight change:     Physical Exam  Constitutional:       Appearance: Normal appearance. Eyes:      Pupils: Pupils are equal, round, and reactive to light. Cardiovascular:      Rate and Rhythm: Normal rate and regular rhythm. Pulses: Normal pulses. Heart sounds: Normal heart sounds. No murmur heard. Pulmonary:      Effort: Pulmonary effort is normal.      Breath sounds: Normal breath sounds. No wheezing or rhonchi. Abdominal:      General: Abdomen is protuberant. There is no distension. Palpations: Abdomen is soft. Tenderness: There is no abdominal tenderness.    Musculoskeletal: General: Normal range of motion. Cervical back: Normal range of motion. Skin:     General: Skin is warm and dry. Capillary Refill: Capillary refill takes less than 2 seconds. Neurological:      General: No focal deficit present. Mental Status: He is alert and oriented to person, place, and time.    Psychiatric:         Mood and Affect: Mood normal.         Behavior: Behavior normal.         Medications     Continuous Infusions:   sodium chloride      dextrose 5 % and 0.45 % NaCl 150 mL/hr at 05/27/22 0859     Scheduled Meds:   sodium phosphate IVPB  20 mmol IntraVENous Once    insulin glargine  55 Units SubCUTAneous Nightly    insulin lispro  0-18 Units SubCUTAneous Q4H    sodium chloride flush  5-40 mL IntraVENous 2 times per day    piperacillin-tazobactam  3,375 mg IntraVENous Q8H    enoxaparin  30 mg SubCUTAneous BID    atorvastatin  40 mg Oral Daily    lisinopril  5 mg Oral Daily     PRN Meds: sodium chloride flush, sodium chloride, polyethylene glycol, acetaminophen **OR** acetaminophen, ondansetron **OR** ondansetron, glucose, glucagon (rDNA), dextrose bolus **OR** dextrose bolus, potassium chloride, magnesium sulfate, sodium phosphate IVPB **OR** sodium phosphate IVPB **OR** sodium phosphate IVPB, dextrose 5 % and 0.45 % NaCl  Nutrition:   NG/OG tube TF type: Pulmocare/Nephro/Glucerna/Jevity        At rate: ml/h    Labs and Imaging Studies     CBC:   Recent Labs     05/26/22  0300 05/26/22  0606 05/27/22  0625   WBC 15.2* 14.8* 12.9*   HGB 13.0 13.6 13.2   HCT 40.3 41.3 38.9   MCV 88.0 87.1 84.2    317 321       BMP:    Recent Labs     05/26/22  1603 05/26/22  2314 05/27/22  0625    136 132   K 3.3* 3.3* 4.0    102 102   CO2 20* 20* 18*   BUN 9 7 5*   CREATININE 0.6* 0.6* 0.5*   GLUCOSE 234* 258* 279*       LIVER PROFILE:   Recent Labs     05/25/22  0848 05/26/22  0300   AST 17  --    ALT 18  --    LIPASE  --  34   BILITOT 0.3  --    ALKPHOS 143*  -- PT/INR:   Recent Labs     05/25/22 2139   PROTIME 13.1*   INR 1.2       APTT:   No results for input(s): APTT in the last 72 hours. Fasting Lipid Panel:    Lab Results   Component Value Date    CHOL 167 08/10/2015    TRIG 153 08/10/2015    HDL 29 08/10/2015       Cardiac Enzymes:    Lab Results   Component Value Date    CKTOTAL 100 05/25/2022    TROPONINI <0.01 08/09/2015       Notable Cultures:      Blood cultures   Blood Culture, Routine   Date Value Ref Range Status   05/26/2022 24 Hours no growth  Preliminary     Respiratory cultures No results found for: RESPCULTURE   Gram Stain Result   Date Value Ref Range Status   03/09/2022 Refer to ordered Gram stain for results  Final     Urine   Urine Culture, Routine   Date Value Ref Range Status   03/05/2022 <10,000 CFU/mL  Mixed gram positive organisms    Final     Legionella No results found for: LABLEGI  C Diff PCR No results found for: CDIFPCR  Wound culture/abscess: No results for input(s): WNDABS in the last 72 hours. Tip culture:No results for input(s): CXCATHTIP in the last 72 hours. Oxygen: Additional Respiratory Assessments  Heart Rate: 88  Resp: 19  SpO2: 95 %            Imaging Studies:  CT ABDOMEN PELVIS W IV CONTRAST Additional Contrast? None   Final Result   1. There has been reaccumulation of fluid in the right lower quadrant   extraperitoneal collection since prior examination with interval removal of   percutaneous drainage catheter. 2. Colonic diverticulosis without evidence of diverticulitis. CT CHEST WO CONTRAST   Final Result   1. Mildly prominent pulmonary artery, consider pulmonary artery hypertension. 2.  Wall thickening of the mid and distal esophagus, consider esophagitis. 3.  Lungs are clear. XR CHEST PORTABLE   Final Result   No acute process. CT ABSCESS DRAINAGE W CATH PLACEMENT S&I    (Results Pending)        Resident's Assessment and Plan     Assessment:    1.  Euglycemia DKA 2/2

## 2022-05-27 NOTE — PROGRESS NOTES
[facility] PROGRESS NOTE      Chief complaint: Follow-up of intraabdominal abscess    The patient is a 64 y.o. 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 Ashley Chisholm and given a course of piperacillin-tazobactam until 10 days after drain removal on 03/23 then cefdinir, presented on 05/25 with fatigue accompanied by abdominal pain and diarrhea for 4 days, found to be in DKA and sepsis with leukocytosis of 20,000, CT abdomen and pelvis showing fluid reaccumulation in the right lower quadrant extraperitoneal collection. CT chest showed clear lungs. He received a dose of vancomycin on admission. Piperacillin-tazobactam was started on admission. He underwent percutaneous drain placement on 05/27. Subjective: Patient was seen and examined. No chills, no abdominal pain, no diarrhea, no rash, no itching. Objective:    Vitals:    05/27/22 0700   BP: (!) 103/52   Pulse: (!) 104   Resp: 22   Temp:    SpO2: 95%     Constitutional: Alert, not in distress  Respiratory: Clear breath sounds, no crackles, no wheezes  Cardiovascular: Regular rate and rhythm, no murmurs  Gastrointestinal: Bowel sounds present, soft, nontender. RLQ percutaneous drain in place  Skin: Warm and dry, no active dermatoses  Musculoskeletal: No joint swelling, no joint erythema    Labs, imaging, and medical records/notes were personally reviewed. Assessment:  Sepsis, resolving, secondary to intraabdominal abscess  DKA    Recommendations:  Continue piperacillin-tazobactam 3.375g q8h. Follow up abscess fluid cultures. Continue supportive care.     Thank you for involving me in the care of [employer].  Dr. Dorotha Duverney will continue to follow. Please do not hesitate to call for any questions or concerns.     Electronically signed by Venus Frankel, MD on 5/27/2022 at 9:22 AM

## 2022-05-27 NOTE — PROGRESS NOTES
TRANSFERRED TO [facility]. [facility] FAMILY NOTIFIED BY PATIENT.

## 2022-05-27 NOTE — POST SEDATION
POST SEDATION NOTE:  Time: 10:37 AM    Cardiopulmonary: Vitals Signs Stable: yes    Level of Consciousness: alert    Reversal Agent Used: No    Complications: none    Follow-up/Observations: none    Pain Score: 1    Kristi Guerra MD

## 2022-05-27 NOTE — PLAN OF CARE
Problem: Chronic Conditions and Co-morbidities  Goal: Patient's chronic conditions and co-morbidity symptoms are monitored and maintained or improved  5/27/2022 1450 by Lori Hammond RN  Outcome: Progressing     Problem: Safety - Adult  Goal: Free from fall injury  5/27/2022 1450 by Lori Hammond RN  Outcome: Progressing     Problem: ABCDS Injury Assessment  Goal: Absence of physical injury  5/27/2022 1450 by Lori Hammond RN  Outcome: Progressing     Problem: Pain  Goal: Verbalizes/displays adequate comfort level or baseline comfort level  5/27/2022 1450 by Lori Hammond RN  Outcome: Progressing

## 2022-05-27 NOTE — CARE COORDINATION
5/27:  Update CM Note:  Pt presented to the ER for generalized fatigue, abdominal pain, diarrhea, nausea & emesis beginning 4 days prior. Pt was found to be in DKA & transferred to MICU. Pt is on room air, IIv Zosyn & transition to sliding scale Insulin. IR placed a drain for intra-abdominal abscess on today. Cm met with pt on 5/27. Pt's PCP is Dr Erin Carlos on [address]. Pt lives with his wife in a house with 3 steps to enter. The bed/bathroom are on the 1st floor. PTA pt was independent, has a Cpap & glucometer at home. Pt states he treats his DM with SQ injections. Pt's dc plan is to return home. No needs at this time. Cm discussed drain care & if he is interested in [facility]. Pt stated he has had a drain in the past & he feels he can management. CM explained [facility] is booked until next weekend. He is open to [facility] if available. CM spoke with pt & his wife to express that they need do teaching with the nurses on drain care. Pt is open to whoever is covered by his insurance & has availability. Blossom Nelson will take a look at the pt information, faxed information over. Pt's family can transport him home on dc. [facility] will continue to follow for dc planning. Electronically signed by Charla Mcdonnell RN on 5/27/2022 at 12:32 PM     The Plan for Transition of Care is related to the following treatment goals: [facility]    The Patient and/or patient representative was provided with a choice of provider and agrees   with the discharge plan. [x] Yes [] No    Freedom of choice list was provided with basic dialogue that supports the patient's individualized plan of care/goals, treatment preferences and shares the quality data associated with the providers.  [x] Yes [] No

## 2022-05-27 NOTE — PROGRESS NOTES
ENDOCRINOLOGY PROGRESS NOTE      Date of admission: 5/25/2022  Date of service: 5/27/2022  Admitting physician: Axel Szymanski DO   Primary Care Physician: Akiko Holman MD  Consultant physician: Renzo Maldonado MD     Reason for the consultation:  Uncontrolled DM    History of Present Illness: The history is provided by the patient. Accuracy of the patient data is excellent    Lewis Urena is a very pleasant 64 y.o. old male with PMH of Obesity, DM type 2, HLD and other listed below admitted to [facility] on 5/25/2022 because of abdominal pain,  nausea, vomiting and fatigue , endocrine service was consulted for diabetes management.      Subjective   Seen and examined this AM, going for surgery today, BG high this AM but pt was on D5 overnight     Point of care glucose monitoring   (Independently reviewed)   Recent Labs     05/26/22  1202 05/26/22  1343 05/26/22  1500 05/26/22  1603 05/26/22  1704 05/26/22  1946 05/26/22  2348 05/27/22  0639   GLUMET 232* 233* 250* 214* 212* 212* 225* 258*       Scheduled Meds:   sodium phosphate IVPB  20 mmol IntraVENous Once    insulin glargine  55 Units SubCUTAneous Nightly    insulin lispro  0-18 Units SubCUTAneous Q4H    sodium chloride flush  5-40 mL IntraVENous 2 times per day    piperacillin-tazobactam  3,375 mg IntraVENous Q8H    [Held by provider] enoxaparin  30 mg SubCUTAneous BID    atorvastatin  40 mg Oral Daily    lisinopril  5 mg Oral Daily       PRN Meds:   sodium chloride flush, 5-40 mL, PRN  sodium chloride, , PRN  polyethylene glycol, 17 g, Daily PRN  acetaminophen, 650 mg, Q6H PRN   Or  acetaminophen, 650 mg, Q6H PRN  ondansetron, 4 mg, Q8H PRN   Or  ondansetron, 4 mg, Q6H PRN  glucose, 4 tablet, PRN  glucagon (rDNA), 1 mg, PRN  dextrose bolus, 125 mL, PRN   Or  dextrose bolus, 250 mL, PRN  potassium chloride, 10 mEq, PRN  magnesium sulfate, 1,000 mg, PRN  sodium phosphate IVPB, 10 mmol, PRN   Or  sodium phosphate IVPB, 15 mmol, PRN   Or  sodium phosphate IVPB, 20 mmol, PRN  dextrose 5 % and 0.45 % NaCl, , Continuous PRN      Continuous Infusions:   sodium chloride      insulin Stopped (05/26/22 1705)    dextrose 5 % and 0.45 % NaCl 150 mL/hr at 05/27/22 3412       Review of Systems  All systems reviewed. All negative except for symptoms mentioned in HPI     OBJECTIVE    BP (!) 103/52   Pulse (!) 104   Temp 98 °F (36.7 °C) (Axillary)   Resp 22   Ht 6' 2\" (1.88 m)   Wt 269 lb 1.6 oz (122.1 kg)   SpO2 95%   BMI 34.55 kg/m²     Intake/Output Summary (Last 24 hours) at 5/27/2022 1004  Last data filed at 5/27/2022 0405  Gross per 24 hour   Intake 18980.41 ml   Output 2850 ml   Net 7436.41 ml     Physical examination:  Due to this being a TeleHealth encounter, evaluation of the following organ systems is limited: Vitals/Constitutional/EENT/Resp/CV/GI//MS/Neuro/Skin/Heme-Lymph-Imm. Modified physical exam through Telemedicine camera    General: Communicating well via camera   Neck: no obvious neck mass. No obvious neck deformity     CVS: no distress   Chest: no distress. Chest is moving with respiration    Extremities:  no visible tremor  Skin: No visible rashes as seen from camera   Musculoskeletal: no visible deformity  Neuro: Alert and oriented to person, place, and time. Psychiatric: Normal mood and affect.  Behavior is normal    Review of Laboratory Data:  I personally reviewed the following labs:   Recent Labs     05/26/22  0300 05/26/22  0606 05/27/22  0625   WBC 15.2* 14.8* 12.9*   RBC 4.58 4.74 4.62   HGB 13.0 13.6 13.2   HCT 40.3 41.3 38.9   MCV 88.0 87.1 84.2   MCH 28.4 28.7 28.6   MCHC 32.3 32.9 33.9   RDW 14.2 14.3 14.4    317 321   MPV 9.6 9.5 9.3     Recent Labs     05/25/22  0848 05/25/22  1130 05/26/22  1603 05/26/22  2314 05/27/22  0625   *   < > 137 136 132   K 4.3   < > 3.3* 3.3* 4.0   CL 95*   < > 103 102 102   CO2 8*   < > 20* 20* 18*   BUN 17   < > 9 7 5*   CREATININE 0.8   < > 0.6* 0.6* 0.5*   GLUCOSE 225*   < > 234* 258* 279*   CALCIUM 9.5   < > 8.0* 8.1* 8.0*   PROT 8.8*  --   --   --   --    LABALBU 3.6  --   --   --   --    BILITOT 0.3  --   --   --   --    ALKPHOS 143*  --   --   --   --    AST 17  --   --   --   --    ALT 18  --   --   --   --     < > = values in this interval not displayed. Beta-Hydroxybutyrate   Date Value Ref Range Status   05/25/2022 >4.50 (H) 0.02 - 0.27 mmol/L Final   03/05/2022 1.30 (H) 0.02 - 0.27 mmol/L Final     Lab Results   Component Value Date    LABA1C 11.5 03/06/2022    LABA1C 13.0 11/23/2021    LABA1C 13.0 11/23/2021     Lab Results   Component Value Date/Time    TSH 1.300 03/07/2022 05:25 AM     Lab Results   Component Value Date    LABA1C 11.5 03/06/2022    GLUCOSE 279 05/27/2022    GLUCOSE 191 04/01/2011    MALBCR 101.7 03/29/2022    LABCREA 87.53 03/29/2022     Lab Results   Component Value Date    TRIG 153 08/10/2015    HDL 29 08/10/2015    LDLCALC 107 08/10/2015    CHOL 167 08/10/2015       Blood culture   Lab Results   Component Value Date    BC 24 Hours no growth 05/26/2022    BC 24 Hours no growth 05/25/2022       Radiology:  CT ABDOMEN PELVIS W IV CONTRAST Additional Contrast? None   Final Result   1. There has been reaccumulation of fluid in the right lower quadrant   extraperitoneal collection since prior examination with interval removal of   percutaneous drainage catheter. 2. Colonic diverticulosis without evidence of diverticulitis. CT CHEST WO CONTRAST   Final Result   1. Mildly prominent pulmonary artery, consider pulmonary artery hypertension. 2.  Wall thickening of the mid and distal esophagus, consider esophagitis. 3.  Lungs are clear. XR CHEST PORTABLE   Final Result   No acute process.          CT ABSCESS DRAINAGE W CATH PLACEMENT S&I    (Results Pending)       Medical Records/Labs/Images review:   I personally reviewed and summarized previous records   All labs and imaging were reviewed independently     [address] Samy, a 64 y.o.-old male seen today for inpatient diabetes management     Diabetes Mellitus type 2 admitted with DKA   · Patient's diabetes is uncontrolled, A1c 11.5%  · BG high this AM but pt was on D5 overnight   · We recommend the following DM regimen   · Lantus 55 u nightly   · Humalog high dose slidng scale Q4hrs   · Will titrate insulin dose based on the blood glucose trend & insulin requirement  · Will arrange for patient to be seen in endocrinology clinic upon discharge for routine diabetes maintenance and prevention. Recurrent Rt abdominal abscess   · Going for surgery this AM   · Managed by primary and surgery service     The above issues were reviewed with the patient who understood and agreed with the plan. Thank you for allowing us to participate in the care of this patient. Please do not hesitate to contact us with any additional questions. Lisa Cruz MD  Endocrinologist, [facility] Diabetes Care and Endocrinology   [address] [facility] [address]   Phone: [phone]  Fax: [phone]  --------------------------------------------  An electronic signature was used to authenticate this note.  Caleb Aaron MD on 5/27/2022 at 10:04 AM

## 2022-05-27 NOTE — PROGRESS NOTES
Hospitalist Progress Note      Synopsis: Patient is a 59-year-old male who presented for evaluation of fatigue and nausea. Patient has a past medical history significant for diabetes with associated comorbidities and a previous intra-abdominal abscess secondary to perforated appendicitis with a recent s/p IR drainage. Patient states he was feeling fatigued and developed nausea last 24 hours and went to [facility] ED for further evaluation. CT imaging revealed the return of his intra-abdominal fluid collection in the right lower quadrant. Patient states that he had 1 episode of diarrhea last week but has otherwise had normal bowel movements. He was tolerating a diet up until the last 24 hours when he developed nausea. Patient had labs significant for a beta hydroxybutyrate of 4.50, a glucose of 242, pH of 7.14 the patient was diagnosed with DKA and started on insulin infusion and was admitted to medical intensive care for further management. 22: Patient bridged from IV insulin drip to subcu insulin  22: abd drain placed in IR, fluid cultures sent. Hospital day 2     Subjective:  Patient seen and examined at bedside, patient laying comfortably with no complaints. Stable overnight. No issues reported. Patient seen and examined  Records reviewed. Temp (24hrs), Av °F (36.7 °C), Min:98 °F (36.7 °C), Max:98 °F (36.7 °C)    DIET: Diet NPO Exceptions are: Sips of Water with Meds  CODE: Full Code    Intake/Output Summary (Last 24 hours) at 2022 0831  Last data filed at 2022 0634  Gross per 24 hour   Intake 62415.41 ml   Output 3450 ml   Net 6836.41 ml       Review of Systems: All bolded are positive; please see HPI  General:  Fever, chills, diaphoresis, fatigue, malaise, night sweats, weight loss  Psychological:  Anxiety, disorientation, hallucinations. ENT:  Epistaxis, headaches, vertigo, visual changes.   Cardiovascular:  Chest pain, irregular heartbeats, palpitations, paroxysmal nocturnal dyspnea. Respiratory:  Shortness of breath, coughing, sputum production, hemoptysis, wheezing, orthopnea. Gastrointestinal:  Nausea, vomiting, diarrhea, heartburn, constipation, abdominal pain, hematemesis, hematochezia, melena, acholic stools  Genito-Urinary:  Dysuria, urgency, frequency, hematuria  Musculoskeletal:  Joint pain, joint stiffness, joint swelling, muscle pain  Neurology:  Headache, focal neurological deficits, weakness, numbness, paresthesia  Derm:  Rashes, ulcers, excoriations, bruising  Extremities:  Decreased ROM, peripheral edema, mottling    Objective:    BP (!) 103/52   Pulse (!) 104   Temp 98 °F (36.7 °C) (Axillary)   Resp 22   Ht 6' 2\" (1.88 m)   Wt 269 lb 1.6 oz (122.1 kg)   SpO2 95%   BMI 34.55 kg/m²     General appearance: Obese middle-aged man in no apparent distress, appears stated age and cooperative. Respiratory:  Clear to auscultation bilaterally. Normal respiratory effort. Cardiovascular:  RRR. S1, S2 without MRG. PV: Pulses palpable. No edema. Abdomen: Soft, non-tender, non-distended. +BS  Musculoskeletal: No obvious deformities. Skin: Normal skin color. No rashes or lesions. Good turgor. Neurologic:  Grossly non-focal. Awake, alert, following commands.    Psychiatric: Alert and oriented, thought content appropriate, normal insight and judgement    Medications:  REVIEWED DAILY    Infusion Medications    sodium chloride      insulin Stopped (05/26/22 1459)    dextrose 5 % and 0.45 % NaCl 150 mL/hr at 05/27/22 0634     Scheduled Medications    sodium phosphate IVPB  20 mmol IntraVENous Once    insulin lispro  0-30 Units SubCUTAneous Q4H    sodium chloride flush  5-40 mL IntraVENous 2 times per day    insulin glargine  55 Units SubCUTAneous Daily    piperacillin-tazobactam  3,375 mg IntraVENous Q8H    [Held by provider] enoxaparin  30 mg SubCUTAneous BID    atorvastatin  40 mg Oral Daily    lisinopril  5 mg Oral Daily     PRN Meds: sodium chloride flush, sodium chloride, polyethylene glycol, acetaminophen **OR** acetaminophen, ondansetron **OR** ondansetron, glucose, glucagon (rDNA), dextrose bolus **OR** dextrose bolus, potassium chloride, magnesium sulfate, sodium phosphate IVPB **OR** sodium phosphate IVPB **OR** sodium phosphate IVPB, dextrose 5 % and 0.45 % NaCl    Labs:     Recent Labs     05/26/22  0300 05/26/22  0606 05/27/22  0625   WBC 15.2* 14.8* 12.9*   HGB 13.0 13.6 13.2   HCT 40.3 41.3 38.9    317 321       Recent Labs     05/26/22  1603 05/26/22  2314 05/27/22  0625    136 132   K 3.3* 3.3* 4.0    102 102   CO2 20* 20* 18*   BUN 9 7 5*   CREATININE 0.6* 0.6* 0.5*   CALCIUM 8.0* 8.1* 8.0*   PHOS 0.9* 0.8* 1.4*       Recent Labs     05/25/22  0848 05/26/22  0300   PROT 8.8*  --    ALKPHOS 143*  --    ALT 18  --    AST 17  --    BILITOT 0.3  --    LIPASE  --  34       Recent Labs     05/25/22  2139   INR 1.2       Recent Labs     05/25/22  1242   CKTOTAL 100       Chronic labs:    Lab Results   Component Value Date    CHOL 167 08/10/2015    TRIG 153 (H) 08/10/2015    HDL 29 08/10/2015    LDLCALC 107 (H) 08/10/2015    TSH 1.300 03/07/2022    INR 1.2 05/25/2022    LABA1C 11.5 (H) 03/06/2022       Radiology: REVIEWED DAILY    Assessment:  · Intra-abdominal abscess  · Sepsis, secondary to intra-abdominal abscess  · Diabetic ketoacidosis, resolved.    · A1C 11.5%  · Leukocytosis WBC 14.8 today  · Hypertension  · Hyperlipidemia    Plan:  · General surgery following  · Infectious disease following  · Zosyn and vancomycin  · Blood cultures pending  · Abscess drainage with interventional radiology drain placement complete, body fluid cultures pending  · Insulin drip stopped bridged to subcu insulin  · Monitor serum electrolytes  · Continue home medications        DVT Prophylaxis [x] Lovenox  []  Heparin [] DOAC [] PCDs [] Ambulation    GI Prophylaxis [x] PPI  [] H2 Blocker   [] Carafate  [] Diet/Tube Feeds   Level of care [] Med/Surg  [] Intermediate  [x]  ICU   Diet Diet NPO Exceptions are: Sips of Water with Meds    Family contact [x]  N/A    [] At bedside  [] Phone call   Disposition Patient requires continued admission for management of intra-abdominal abscess and DKA   MDM [] Low    [x] Moderate  []   High       Discharge Plan: Home pending clinical improvement    +++++++++++++++++++++++++++++++++++++++++++++++++  Merriam [place], [address], [place]  +++++++++++++++++++++++++++++++++++++++++++++++++  NOTE: This report was transcribed using voice recognition software. Every effort was made to ensure accuracy; however, inadvertent computerized transcription errors may be present.

## 2022-05-27 NOTE — PRE SEDATION
Bibiana Harden II, MD  5/27/2022  10:38 AM        PRE-SEDATION PHYSICIAN ASSESSMENT:      1. HISTORY & PHYSICAL EXAMINATION:  Comments: none    Vitals:    05/27/22 1033   BP: 116/66   Pulse: 92   Resp: 26   Temp:    SpO2: 94%       Allergies: Patient has no known allergies. 2. Heart and Lungs immediately prior to procedure demonstrate no contraindications to proceed      Chief Complaint: Intra-abdominal abscess ([address]    Drug: unknown  Tobacco: unknown    3. PAST ANESTHESIA EXPERIENCE:  unknown. 4. AIRWAY/TEETH/HEAD & NECK(Mallampati Classification):  II (soft palate, uvula, fauces visible)    5: NORMAL RANGE OF MOTION OF NECK: No    6. PATIENT WILL LIKELY TOLERATE PLAN OF MODERATE SEDATION    7. ASA 2.     Woody Tomas MD

## 2022-05-28 LAB
ANION GAP SERPL CALCULATED.3IONS-SCNC: 10 MMOL/L (ref 7–16)
ANION GAP SERPL CALCULATED.3IONS-SCNC: 13 MMOL/L (ref 7–16)
BASOPHILS ABSOLUTE: 0.05 E9/L (ref 0–0.2)
BASOPHILS RELATIVE PERCENT: 0.6 % (ref 0–2)
BUN BLDV-MCNC: 3 MG/DL (ref 6–20)
BUN BLDV-MCNC: 4 MG/DL (ref 6–20)
CALCIUM SERPL-MCNC: 8 MG/DL (ref 8.6–10.2)
CALCIUM SERPL-MCNC: 8 MG/DL (ref 8.6–10.2)
CHLORIDE BLD-SCNC: 103 MMOL/L (ref 98–107)
CHLORIDE BLD-SCNC: 105 MMOL/L (ref 98–107)
CO2: 20 MMOL/L (ref 22–29)
CO2: 24 MMOL/L (ref 22–29)
CREAT SERPL-MCNC: 0.5 MG/DL (ref 0.7–1.2)
CREAT SERPL-MCNC: 0.5 MG/DL (ref 0.7–1.2)
EOSINOPHILS ABSOLUTE: 0.06 E9/L (ref 0.05–0.5)
EOSINOPHILS RELATIVE PERCENT: 0.7 % (ref 0–6)
GFR AFRICAN AMERICAN: >60
GFR AFRICAN AMERICAN: >60
GFR NON-AFRICAN AMERICAN: >60 ML/MIN/1.73
GFR NON-AFRICAN AMERICAN: >60 ML/MIN/1.73
GLUCOSE BLD-MCNC: 160 MG/DL (ref 74–99)
GLUCOSE BLD-MCNC: 177 MG/DL (ref 74–99)
GRAM STAIN ORDERABLE: NORMAL
HCT VFR BLD CALC: 40.3 % (ref 37–54)
HEMOGLOBIN: 13.8 G/DL (ref 12.5–16.5)
IMMATURE GRANULOCYTES #: 0.16 E9/L
IMMATURE GRANULOCYTES %: 1.8 % (ref 0–5)
LYMPHOCYTES ABSOLUTE: 1.04 E9/L (ref 1.5–4)
LYMPHOCYTES RELATIVE PERCENT: 11.6 % (ref 20–42)
MAGNESIUM: 2 MG/DL (ref 1.6–2.6)
MAGNESIUM: 2.3 MG/DL (ref 1.6–2.6)
MCH RBC QN AUTO: 28.4 PG (ref 26–35)
MCHC RBC AUTO-ENTMCNC: 34.2 % (ref 32–34.5)
MCV RBC AUTO: 82.9 FL (ref 80–99.9)
METER GLUCOSE: 107 MG/DL (ref 74–99)
METER GLUCOSE: 108 MG/DL (ref 74–99)
METER GLUCOSE: 140 MG/DL (ref 74–99)
METER GLUCOSE: 149 MG/DL (ref 74–99)
MONOCYTES ABSOLUTE: 0.86 E9/L (ref 0.1–0.95)
MONOCYTES RELATIVE PERCENT: 9.6 % (ref 2–12)
NEUTROPHILS ABSOLUTE: 6.81 E9/L (ref 1.8–7.3)
NEUTROPHILS RELATIVE PERCENT: 75.7 % (ref 43–80)
PDW BLD-RTO: 14.4 FL (ref 11.5–15)
PHOSPHORUS: 1 MG/DL (ref 2.5–4.5)
PHOSPHORUS: 1.4 MG/DL (ref 2.5–4.5)
PLATELET # BLD: 331 E9/L (ref 130–450)
PMV BLD AUTO: 9.3 FL (ref 7–12)
POTASSIUM SERPL-SCNC: 3.1 MMOL/L (ref 3.5–5)
POTASSIUM SERPL-SCNC: 3.3 MMOL/L (ref 3.5–5)
RBC # BLD: 4.86 E12/L (ref 3.8–5.8)
SODIUM BLD-SCNC: 136 MMOL/L (ref 132–146)
SODIUM BLD-SCNC: 139 MMOL/L (ref 132–146)
WBC # BLD: 9 E9/L (ref 4.5–11.5)

## 2022-05-28 PROCEDURE — 2580000003 HC RX 258: Performed by: STUDENT IN AN ORGANIZED HEALTH CARE EDUCATION/TRAINING PROGRAM

## 2022-05-28 PROCEDURE — 85025 COMPLETE CBC W/AUTO DIFF WBC: CPT

## 2022-05-28 PROCEDURE — 99232 SBSQ HOSP IP/OBS MODERATE 35: CPT | Performed by: SURGERY

## 2022-05-28 PROCEDURE — 84100 ASSAY OF PHOSPHORUS: CPT

## 2022-05-28 PROCEDURE — 6370000000 HC RX 637 (ALT 250 FOR IP): Performed by: STUDENT IN AN ORGANIZED HEALTH CARE EDUCATION/TRAINING PROGRAM

## 2022-05-28 PROCEDURE — 83735 ASSAY OF MAGNESIUM: CPT

## 2022-05-28 PROCEDURE — 6370000000 HC RX 637 (ALT 250 FOR IP): Performed by: INTERNAL MEDICINE

## 2022-05-28 PROCEDURE — 99232 SBSQ HOSP IP/OBS MODERATE 35: CPT | Performed by: INTERNAL MEDICINE

## 2022-05-28 PROCEDURE — 6360000002 HC RX W HCPCS: Performed by: STUDENT IN AN ORGANIZED HEALTH CARE EDUCATION/TRAINING PROGRAM

## 2022-05-28 PROCEDURE — 82962 GLUCOSE BLOOD TEST: CPT

## 2022-05-28 PROCEDURE — 80048 BASIC METABOLIC PNL TOTAL CA: CPT

## 2022-05-28 PROCEDURE — 2500000003 HC RX 250 WO HCPCS: Performed by: STUDENT IN AN ORGANIZED HEALTH CARE EDUCATION/TRAINING PROGRAM

## 2022-05-28 PROCEDURE — 36415 COLL VENOUS BLD VENIPUNCTURE: CPT

## 2022-05-28 PROCEDURE — 1200000000 HC SEMI PRIVATE

## 2022-05-28 RX ADMIN — Medication 10 ML: at 20:53

## 2022-05-28 RX ADMIN — LISINOPRIL 5 MG: 5 TABLET ORAL at 09:06

## 2022-05-28 RX ADMIN — INSULIN LISPRO 12 UNITS: 100 INJECTION, SOLUTION INTRAVENOUS; SUBCUTANEOUS at 09:06

## 2022-05-28 RX ADMIN — INSULIN LISPRO 3 UNITS: 100 INJECTION, SOLUTION INTRAVENOUS; SUBCUTANEOUS at 09:08

## 2022-05-28 RX ADMIN — Medication 10 MEQ: at 09:19

## 2022-05-28 RX ADMIN — INSULIN LISPRO 3 UNITS: 100 INJECTION, SOLUTION INTRAVENOUS; SUBCUTANEOUS at 13:11

## 2022-05-28 RX ADMIN — Medication 10 MEQ: at 00:46

## 2022-05-28 RX ADMIN — INSULIN LISPRO 3 UNITS: 100 INJECTION, SOLUTION INTRAVENOUS; SUBCUTANEOUS at 17:17

## 2022-05-28 RX ADMIN — PIPERACILLIN AND TAZOBACTAM 3375 MG: 3; .375 INJECTION, POWDER, LYOPHILIZED, FOR SOLUTION INTRAVENOUS at 02:07

## 2022-05-28 RX ADMIN — PIPERACILLIN AND TAZOBACTAM 3375 MG: 3; .375 INJECTION, POWDER, LYOPHILIZED, FOR SOLUTION INTRAVENOUS at 09:19

## 2022-05-28 RX ADMIN — SODIUM PHOSPHATE, MONOBASIC, MONOHYDRATE 20 MMOL: 276; 142 INJECTION, SOLUTION INTRAVENOUS at 12:40

## 2022-05-28 RX ADMIN — INSULIN LISPRO 12 UNITS: 100 INJECTION, SOLUTION INTRAVENOUS; SUBCUTANEOUS at 13:12

## 2022-05-28 RX ADMIN — ENOXAPARIN SODIUM 30 MG: 100 INJECTION SUBCUTANEOUS at 19:50

## 2022-05-28 RX ADMIN — Medication 10 MEQ: at 10:20

## 2022-05-28 RX ADMIN — INSULIN LISPRO 12 UNITS: 100 INJECTION, SOLUTION INTRAVENOUS; SUBCUTANEOUS at 17:20

## 2022-05-28 RX ADMIN — Medication 10 ML: at 09:08

## 2022-05-28 RX ADMIN — ATORVASTATIN CALCIUM 40 MG: 40 TABLET, FILM COATED ORAL at 09:06

## 2022-05-28 RX ADMIN — PIPERACILLIN AND TAZOBACTAM 3375 MG: 3; .375 INJECTION, POWDER, LYOPHILIZED, FOR SOLUTION INTRAVENOUS at 00:15

## 2022-05-28 RX ADMIN — PIPERACILLIN AND TAZOBACTAM 3375 MG: 3; .375 INJECTION, POWDER, LYOPHILIZED, FOR SOLUTION INTRAVENOUS at 16:09

## 2022-05-28 RX ADMIN — ENOXAPARIN SODIUM 30 MG: 100 INJECTION SUBCUTANEOUS at 09:06

## 2022-05-28 RX ADMIN — Medication 10 MEQ: at 11:40

## 2022-05-28 RX ADMIN — Medication 10 MEQ: at 12:46

## 2022-05-28 ASSESSMENT — PAIN SCALES - GENERAL
PAINLEVEL_OUTOF10: 0
PAINLEVEL_OUTOF10: 0

## 2022-05-28 NOTE — PROGRESS NOTES
General Surgery Progress Note  [facility]    Patient's Name/Date of Birth: Lashawn Payment / 1965    Date: May 28, 2022     Surgeon: Tang Juárez MD    Chief Complaint: Right lower quadrant abscess    Patient Active Problem List   Diagnosis    Diabetic foot ulcer ([address]    Diabetes mellitus ([address]    Osteomyelitis of ankle or foot, left, acute ([address]    Cellulitis of foot    Diabetic arthropathy ([address]    Gangrene of toe of left foot ([address]    Morbid (severe) obesity due to excess calories ([address]    Pure hypertriglyceridemia    Diabetic foot infection ([address]    DVT prophylaxis    ISAIAS (acute kidney injury) ([address]    Intra-abdominal abscess ([address]    Vitamin D deficiency    PINEDA (obstructive sleep apnea)    Poorly controlled type 2 diabetes mellitus ([address]       Subjective: Denies pain, no fevers or chills. Tolerating p.o. however has no appetite. Had a bowel movement this morning.     Objective:  BP (!) 100/50   Pulse 80   Temp 98.1 °F (36.7 °C) (Oral)   Resp 18   Ht 6' 2\" (1.88 m)   Wt 269 lb 12.8 oz (122.4 kg)   SpO2 95%   BMI 34.64 kg/m²   Labs:  Recent Labs     05/26/22  0606 05/27/22  0625 05/28/22  0511   WBC 14.8* 12.9* 9.0   HGB 13.6 13.2 13.8   HCT 41.3 38.9 40.3     Lab Results   Component Value Date    CREATININE 0.5 (L) 05/28/2022    BUN 4 (L) 05/28/2022     05/28/2022    K 3.3 (L) 05/28/2022     05/28/2022    CO2 20 (L) 05/28/2022     Recent Labs     05/26/22  0300   LIPASE 34     CBC with Differential:    Lab Results   Component Value Date    WBC 9.0 05/28/2022    RBC 4.86 05/28/2022    HGB 13.8 05/28/2022    HCT 40.3 05/28/2022     05/28/2022    MCV 82.9 05/28/2022    MCH 28.4 05/28/2022    MCHC 34.2 05/28/2022    RDW 14.4 05/28/2022    LYMPHOPCT 11.6 05/28/2022    MONOPCT 9.6 05/28/2022    BASOPCT 0.6 05/28/2022    MONOSABS 0.86 05/28/2022    LYMPHSABS 1.04 05/28/2022    EOSABS 0.06 05/28/2022    BASOSABS 0.05 05/28/2022     BMP:    Lab Results Component Value Date     05/28/2022    K 3.3 05/28/2022    K 3.4 03/11/2022     05/28/2022    CO2 20 05/28/2022    BUN 4 05/28/2022    LABALBU 3.6 05/25/2022    LABALBU 4.4 04/01/2011    CREATININE 0.5 05/28/2022    CALCIUM 8.0 05/28/2022    GFRAA >60 05/28/2022    LABGLOM >60 05/28/2022    GLUCOSE 177 05/28/2022    GLUCOSE 191 04/01/2011       General appearance:  NAD  Head: NCAT, PERRLA, EOMI, red conjunctiva  Neck: supple, no masses  Lungs: CTAB, equal chest rise bilateral  Heart: Reg rate  Abdomen: soft, nondistended, nontender. Right lower quadrant drain in place with purulent material  Skin; no lesions  Gu: no cva tenderness  Extremities: extremities normal, atraumatic, no cyanosis or edema      Assessment/Plan:  Humaira Juarez is a 64 y.o. male with recurrent right lower quadrant periappendiceal abscess status post IR drain    Drain output noted, purulent  Continue Zosyn  Antibiotics per infectious disease. Patient reports not having a PICC line placed the last time he had IR drainage of right lower quadrant abscess  Diet as tolerated  We discussed the need for follow up with colonoscopy in 6-8 weeks with or without interval appendectomy/ileocecectomy.       Wes Serna MD  5/28/2022  10:43 AM

## 2022-05-28 NOTE — PROGRESS NOTES
acetaminophen **OR** acetaminophen, ondansetron **OR** ondansetron, glucose, glucagon (rDNA), dextrose bolus **OR** dextrose bolus, potassium chloride, magnesium sulfate, sodium phosphate IVPB **OR** sodium phosphate IVPB **OR** sodium phosphate IVPB    Labs:     Recent Labs     05/26/22  0606 05/27/22  0625 05/28/22  0511   WBC 14.8* 12.9* 9.0   HGB 13.6 13.2 13.8   HCT 41.3 38.9 40.3    321 331       Recent Labs     05/27/22  0625 05/27/22  0625 05/27/22  1700 05/27/22  1950 05/28/22  0511     --  135  --  136   K 4.0  --  2.9*  --  3.3*     --  102  --  103   CO2 18*  --  21*  --  20*   BUN 5*  --  4*  --  4*   CREATININE 0.5*  --  0.5*  --  0.5*   CALCIUM 8.0*  --  8.0*  --  8.0*   PHOS 1.4*   < > 1.1* 1.1* 1.4*    < > = values in this interval not displayed. Recent Labs     05/26/22  0300   LIPASE 34       Recent Labs     05/25/22  2139   INR 1.2       Recent Labs     05/25/22  1242   CKTOTAL 100       Chronic labs:    Lab Results   Component Value Date    CHOL 167 08/10/2015    TRIG 153 (H) 08/10/2015    HDL 29 08/10/2015    LDLCALC 107 (H) 08/10/2015    TSH 1.300 03/07/2022    INR 1.2 05/25/2022    LABA1C 11.5 (H) 03/06/2022       Radiology: REVIEWED DAILY    Assessment:  · Intra-abdominal abscess  · Sepsis, secondary to intra-abdominal abscess  · Diabetic ketoacidosis, resolved.    · A1C 11.5%  · Leukocytosis WBC 14.8 today  · Hypertension  · Hyperlipidemia  · Hypokalemia potassium 3.3    Plan:  · General surgery following  · Infectious disease following  · Zosyn and vancomycin  · Blood cultures pending  · Abscess drainage with interventional radiology drain placement complete, body fluid cultures pending  · Insulin drip stopped bridged to subcu insulin  · Endocrinology following, titrating insulin based on blood glucose trend and insulin requirements  · Monitor serum electrolytes  · Continue home medications  · Potassium replacement protocol ordered        DVT Prophylaxis [x] Lovenox []  Heparin [] DOAC [] PCDs [] Ambulation    GI Prophylaxis [x] PPI  [] H2 Blocker   [] Carafate  [] Diet/Tube Feeds   Level of care [] Med/Surg  [] Intermediate  [x]  ICU   Diet ADULT DIET; Regular; 4 carb choices (60 gm/meal)    Family contact [x]  N/A    [] At bedside  [] Phone call   Disposition Patient requires continued admission further management for intra-abdominal abscess and uncontrolled blood sugars   MDM [] Low    [x] Moderate  []   High       Discharge Plan: Discharged to home pending clinical improvement    +++++++++++++++++++++++++++++++++++++++++++++++++  Sameera Anderson[address]  +++++++++++++++++++++++++++++++++++++++++++++++++  NOTE: This report was transcribed using voice recognition software. Every effort was made to ensure accuracy; however, inadvertent computerized transcription errors may be present.

## 2022-05-28 NOTE — PROGRESS NOTES
[facility] PROGRESS NOTE      Chief complaint: Follow-up of intraabdominal abscess    The patient is a 64 y.o. 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 Harpreet Kuo and given a course of piperacillin-tazobactam until 10 days after drain removal on 03/23 then cefdinir, presented on 05/25 with fatigue accompanied by abdominal pain and diarrhea for 4 days, found to be in DKA and sepsis with leukocytosis of 20,000, CT abdomen and pelvis showing fluid reaccumulation in the right lower quadrant extraperitoneal collection. CT chest showed clear lungs. He received a dose of vancomycin on admission. Piperacillin-tazobactam was started on admission. He underwent percutaneous drain placement on 05/27. Subjective: Patient was seen and examined. No chills, no abdominal pain, no diarrhea, no rash, no itching. No nausea. Appetite good. Objective:    Vitals:    05/28/22 0000   BP: 130/63   Pulse: 89   Resp: 18   Temp: 98 °F (36.7 °C)   SpO2: 97%     Constitutional: Alert, not in distress  Respiratory: Clear breath sounds, no crackles, no wheezes  Cardiovascular: Regular rate and rhythm, no murmurs  Gastrointestinal: Bowel sounds present, soft, nontender. RLQ percutaneous drain in place  Skin: Warm and dry, no active dermatoses  Musculoskeletal: No joint swelling, no joint erythema    Labs, imaging, and medical records/notes were personally reviewed. Abscess gram stain GNR and GPO in chains  Blood cx NGTD    Assessment:  Sepsis, resolving, secondary to intraabdominal abscess  DKA    Recommendations:  Continue piperacillin-tazobactam 3.375g q8h. Follow up abscess fluid cultures.   Continue supportive care.     Thank you for involving me in the care of [employer]. Please do not hesitate to call for any questions or concerns. Electronically signed by SCARLETT Beltrán CNP on 5/28/2022 at 8:17 AM   Pt seen and examined. Above discussed agree with advanced practice nurse. Labs, cultures, and radiographs reviewed. Face to Face encounter occurred. Changes made as necessary.      Joanne Rodriguez MD

## 2022-05-28 NOTE — PLAN OF CARE
Problem: Chronic Conditions and Co-morbidities  Goal: Patient's chronic conditions and co-morbidity symptoms are monitored and maintained or improved  Outcome: Progressing  Flowsheets (Taken 5/27/2022 2100 by Rui Hwang RN)  Care Plan - Patient's Chronic Conditions and Co-Morbidity Symptoms are Monitored and Maintained or Improved: Monitor and assess patient's chronic conditions and comorbid symptoms for stability, deterioration, or improvement     Problem: Discharge Planning  Goal: Discharge to home or other facility with appropriate resources  Outcome: Progressing  Flowsheets (Taken 5/27/2022 2100 by Rui Hwang, RN)  Discharge to home or other facility with appropriate resources: Identify barriers to discharge with patient and caregiver     Problem: Skin/Tissue Integrity  Goal: Absence of new skin breakdown  Description: 1. Monitor for areas of redness and/or skin breakdown  2. Assess vascular access sites hourly  3. Every 4-6 hours minimum:  Change oxygen saturation probe site  4. Every 4-6 hours:  If on nasal continuous positive airway pressure, respiratory therapy assess nares and determine need for appliance change or resting period.   Outcome: Progressing     Problem: Safety - Adult  Goal: Free from fall injury  Outcome: Progressing  Flowsheets (Taken 5/27/2022 2147 by Rui Hwang RN)  Free From Fall Injury: Instruct family/caregiver on patient safety     Problem: ABCDS Injury Assessment  Goal: Absence of physical injury  Outcome: Progressing  Flowsheets (Taken 5/27/2022 2147 by Rui Hwang RN)  Absence of Physical Injury: Implement safety measures based on patient assessment     Problem: Pain  Goal: Verbalizes/displays adequate comfort level or baseline comfort level  Outcome: Progressing  Flowsheets (Taken 5/28/2022 0000 by Rui Hwang RN)  Verbalizes/displays adequate comfort level or baseline comfort level: Encourage patient to monitor pain and request assistance     Problem: Safety - Medical Restraint  Goal: Remains free of injury from restraints (Restraint for Interference with Medical Device)  Description: INTERVENTIONS:  1. Determine that other, less restrictive measures have been tried or would not be effective before applying the restraint  2. Evaluate the patient's condition at the time of restraint application  3. Inform patient/family regarding the reason for restraint  4.  Q2H: Monitor safety, psychosocial status, comfort, nutrition and hydration  Outcome: Progressing

## 2022-05-28 NOTE — PROGRESS NOTES
ENDOCRINOLOGY PROGRESS NOTE      Date of admission: 5/25/2022  Date of service: 5/28/2022  Admitting physician: Thai Pineda MD   Primary Care Physician: Mil Cooper MD  Consultant physician: Leesa Austin MD     Reason for the consultation:  Uncontrolled DM    History of Present Illness: The history is provided by the patient. Accuracy of the patient data is excellent    Johnie Mckenna is a very pleasant 64 y.o. old male with PMH of Obesity, DM type 2, HLD and other listed below admitted to [facility] on 5/25/2022 because of abdominal pain,  nausea, vomiting and fatigue , endocrine service was consulted for diabetes management.      Subjective   Seen and examined this AM, was very sleepy, no acute events     Point of care glucose monitoring   (Independently reviewed)   Recent Labs     05/26/22  1603 05/26/22  1704 05/26/22  1946 05/26/22  2348 05/27/22  0639 05/27/22  1324 05/27/22  1622 05/27/22 2002   GLUMET 214* 212* 212* 225* 258* 240* 189* 218*       Scheduled Meds:   insulin glargine  55 Units SubCUTAneous Nightly    insulin lispro  0-18 Units SubCUTAneous TID WC    insulin lispro  0-9 Units SubCUTAneous Nightly    insulin lispro  12 Units SubCUTAneous TID WC    sodium chloride flush  5-40 mL IntraVENous 2 times per day    piperacillin-tazobactam  3,375 mg IntraVENous Q8H    enoxaparin  30 mg SubCUTAneous BID    atorvastatin  40 mg Oral Daily    lisinopril  5 mg Oral Daily       PRN Meds:   sodium chloride flush, 5-40 mL, PRN  sodium chloride, , PRN  polyethylene glycol, 17 g, Daily PRN  acetaminophen, 650 mg, Q6H PRN   Or  acetaminophen, 650 mg, Q6H PRN  ondansetron, 4 mg, Q8H PRN   Or  ondansetron, 4 mg, Q6H PRN  glucose, 4 tablet, PRN  glucagon (rDNA), 1 mg, PRN  dextrose bolus, 125 mL, PRN   Or  dextrose bolus, 250 mL, PRN  potassium chloride, 10 mEq, PRN  magnesium sulfate, 1,000 mg, PRN  sodium phosphate IVPB, 10 mmol, PRN   Or  sodium phosphate IVPB, 15 mmol, PRN   Or  sodium phosphate IVPB, 20 mmol, PRN      Continuous Infusions:   sodium chloride         Review of Systems  All systems reviewed. All negative except for symptoms mentioned in HPI     OBJECTIVE    BP (!) 100/50   Pulse 80   Temp 98.1 °F (36.7 °C) (Oral)   Resp 18   Ht 6' 2\" (1.88 m)   Wt 269 lb 12.8 oz (122.4 kg)   SpO2 95%   BMI 34.64 kg/m²     Intake/Output Summary (Last 24 hours) at 5/28/2022 1121  Last data filed at 5/28/2022 1053  Gross per 24 hour   Intake 990 ml   Output 610 ml   Net 380 ml     Physical examination:  Due to this being a TeleHealth encounter, evaluation of the following organ systems is limited: Vitals/Constitutional/EENT/Resp/CV/GI//MS/Neuro/Skin/Heme-Lymph-Imm. Modified physical exam through Telemedicine camera    General: Communicating well via camera   Neck: no obvious neck mass. No obvious neck deformity     CVS: no distress   Chest: no distress. Chest is moving with respiration    Extremities:  no visible tremor  Skin: No visible rashes as seen from camera   Musculoskeletal: no visible deformity  Neuro: Alert and oriented to person, place, and time. Psychiatric: Normal mood and affect.  Behavior is normal    Review of Laboratory Data:  I personally reviewed the following labs:   Recent Labs     05/26/22  0606 05/27/22  0625 05/28/22  0511   WBC 14.8* 12.9* 9.0   RBC 4.74 4.62 4.86   HGB 13.6 13.2 13.8   HCT 41.3 38.9 40.3   MCV 87.1 84.2 82.9   MCH 28.7 28.6 28.4   MCHC 32.9 33.9 34.2   RDW 14.3 14.4 14.4    321 331   MPV 9.5 9.3 9.3     Recent Labs     05/27/22  0625 05/27/22  1700 05/28/22  0511    135 136   K 4.0 2.9* 3.3*    102 103   CO2 18* 21* 20*   BUN 5* 4* 4*   CREATININE 0.5* 0.5* 0.5*   GLUCOSE 279* 213* 177*   CALCIUM 8.0* 8.0* 8.0*     Beta-Hydroxybutyrate   Date Value Ref Range Status   05/25/2022 >4.50 (H) 0.02 - 0.27 mmol/L Final   03/05/2022 1.30 (H) 0.02 - 0.27 mmol/L Final     Lab Results   Component Value Date    LABA1C 11.5 03/06/2022    LABA1C 13.0 11/23/2021    LABA1C 13.0 11/23/2021     Lab Results   Component Value Date/Time    TSH 1.300 03/07/2022 05:25 AM     Lab Results   Component Value Date    LABA1C 11.5 03/06/2022    GLUCOSE 177 05/28/2022    GLUCOSE 191 04/01/2011    MALBCR 101.7 03/29/2022    LABCREA 87.53 03/29/2022     Lab Results   Component Value Date    TRIG 153 08/10/2015    HDL 29 08/10/2015    LDLCALC 107 08/10/2015    CHOL 167 08/10/2015       Blood culture   Lab Results   Component Value Date    BC 24 Hours no growth 05/26/2022    BC 24 Hours no growth 05/25/2022       Radiology:  CT ABDOMEN PELVIS W IV CONTRAST Additional Contrast? None   Final Result   1. There has been reaccumulation of fluid in the right lower quadrant   extraperitoneal collection since prior examination with interval removal of   percutaneous drainage catheter. 2. Colonic diverticulosis without evidence of diverticulitis. CT CHEST WO CONTRAST   Final Result   1. Mildly prominent pulmonary artery, consider pulmonary artery hypertension. 2.  Wall thickening of the mid and distal esophagus, consider esophagitis. 3.  Lungs are clear. XR CHEST PORTABLE   Final Result   No acute process. CT ABSCESS DRAINAGE W CATH PLACEMENT S&I    (Results Pending)       Medical Records/Labs/Images review:   I personally reviewed and summarized previous records   All labs and imaging were reviewed independently     Elba Pat Winters, a 64 y.o.-old male seen today for inpatient diabetes management     Diabetes Mellitus type 2 admitted with DKA   · Patient's diabetes is uncontrolled, A1c 11.5%  · We recommend the following DM regimen   · Lantus 55 u nightly   · Humalog 12u with meals   · high dose slidng scale   · Will titrate insulin dose based on the blood glucose trend & insulin requirement  · Will arrange for patient to be seen in endocrinology clinic upon discharge for routine diabetes maintenance and prevention.     Recurrent Rt abdominal abscess   · Managed by primary and surgery service     Thank you for allowing us to participate in the care of this patient. Please do not hesitate to contact us with any additional questions. Rashi Ramirez MD  Endocrinologist, [facility]   [address] [place] [address]   Phone: [phone]  Fax: [phone]  --------------------------------------------  An electronic signature was used to authenticate this note.  Harley Mohamud MD on 5/28/2022 at 11:21 AM 07-Feb-2017

## 2022-05-29 LAB
ANION GAP SERPL CALCULATED.3IONS-SCNC: 10 MMOL/L (ref 7–16)
ANION GAP SERPL CALCULATED.3IONS-SCNC: 12 MMOL/L (ref 7–16)
BASOPHILS ABSOLUTE: 0.08 E9/L (ref 0–0.2)
BASOPHILS RELATIVE PERCENT: 1 % (ref 0–2)
BODY FLUID CULTURE, STERILE: ABNORMAL
BODY FLUID CULTURE, STERILE: ABNORMAL
BUN BLDV-MCNC: 4 MG/DL (ref 6–20)
BUN BLDV-MCNC: 4 MG/DL (ref 6–20)
CALCIUM SERPL-MCNC: 7.9 MG/DL (ref 8.6–10.2)
CALCIUM SERPL-MCNC: 8.3 MG/DL (ref 8.6–10.2)
CHLORIDE BLD-SCNC: 102 MMOL/L (ref 98–107)
CHLORIDE BLD-SCNC: 103 MMOL/L (ref 98–107)
CO2: 24 MMOL/L (ref 22–29)
CO2: 28 MMOL/L (ref 22–29)
CREAT SERPL-MCNC: 0.5 MG/DL (ref 0.7–1.2)
CREAT SERPL-MCNC: 0.6 MG/DL (ref 0.7–1.2)
EOSINOPHILS ABSOLUTE: 0 E9/L (ref 0.05–0.5)
EOSINOPHILS RELATIVE PERCENT: 0 % (ref 0–6)
GFR AFRICAN AMERICAN: >60
GFR AFRICAN AMERICAN: >60
GFR NON-AFRICAN AMERICAN: >60 ML/MIN/1.73
GFR NON-AFRICAN AMERICAN: >60 ML/MIN/1.73
GLUCOSE BLD-MCNC: 159 MG/DL (ref 74–99)
GLUCOSE BLD-MCNC: 184 MG/DL (ref 74–99)
GRAM STAIN RESULT: ABNORMAL
HCT VFR BLD CALC: 38.5 % (ref 37–54)
HEMOGLOBIN: 13.2 G/DL (ref 12.5–16.5)
IMMATURE GRANULOCYTES #: 0.16 E9/L
IMMATURE GRANULOCYTES %: 1.9 % (ref 0–5)
LYMPHOCYTES ABSOLUTE: 1.63 E9/L (ref 1.5–4)
LYMPHOCYTES RELATIVE PERCENT: 19.7 % (ref 20–42)
MAGNESIUM: 2.1 MG/DL (ref 1.6–2.6)
MAGNESIUM: 2.2 MG/DL (ref 1.6–2.6)
MCH RBC QN AUTO: 28.6 PG (ref 26–35)
MCHC RBC AUTO-ENTMCNC: 34.3 % (ref 32–34.5)
MCV RBC AUTO: 83.5 FL (ref 80–99.9)
METER GLUCOSE: 111 MG/DL (ref 74–99)
METER GLUCOSE: 135 MG/DL (ref 74–99)
METER GLUCOSE: 144 MG/DL (ref 74–99)
METER GLUCOSE: 167 MG/DL (ref 74–99)
MONOCYTES ABSOLUTE: 0.78 E9/L (ref 0.1–0.95)
MONOCYTES RELATIVE PERCENT: 9.4 % (ref 2–12)
NEUTROPHILS ABSOLUTE: 5.63 E9/L (ref 1.8–7.3)
NEUTROPHILS RELATIVE PERCENT: 68 % (ref 43–80)
ORGANISM: ABNORMAL
ORGANISM: ABNORMAL
PDW BLD-RTO: 14.7 FL (ref 11.5–15)
PLATELET # BLD: 313 E9/L (ref 130–450)
PMV BLD AUTO: 9.2 FL (ref 7–12)
POTASSIUM SERPL-SCNC: 3.2 MMOL/L (ref 3.5–5)
POTASSIUM SERPL-SCNC: 3.5 MMOL/L (ref 3.5–5)
RBC # BLD: 4.61 E12/L (ref 3.8–5.8)
SODIUM BLD-SCNC: 138 MMOL/L (ref 132–146)
SODIUM BLD-SCNC: 141 MMOL/L (ref 132–146)
WBC # BLD: 8.3 E9/L (ref 4.5–11.5)

## 2022-05-29 PROCEDURE — 80048 BASIC METABOLIC PNL TOTAL CA: CPT

## 2022-05-29 PROCEDURE — 99232 SBSQ HOSP IP/OBS MODERATE 35: CPT | Performed by: INTERNAL MEDICINE

## 2022-05-29 PROCEDURE — 6370000000 HC RX 637 (ALT 250 FOR IP): Performed by: INTERNAL MEDICINE

## 2022-05-29 PROCEDURE — 2580000003 HC RX 258: Performed by: STUDENT IN AN ORGANIZED HEALTH CARE EDUCATION/TRAINING PROGRAM

## 2022-05-29 PROCEDURE — 6370000000 HC RX 637 (ALT 250 FOR IP): Performed by: STUDENT IN AN ORGANIZED HEALTH CARE EDUCATION/TRAINING PROGRAM

## 2022-05-29 PROCEDURE — 6360000002 HC RX W HCPCS: Performed by: STUDENT IN AN ORGANIZED HEALTH CARE EDUCATION/TRAINING PROGRAM

## 2022-05-29 PROCEDURE — 85025 COMPLETE CBC W/AUTO DIFF WBC: CPT

## 2022-05-29 PROCEDURE — 1200000000 HC SEMI PRIVATE

## 2022-05-29 PROCEDURE — S5553 INSULIN LONG ACTING 5 U: HCPCS | Performed by: INTERNAL MEDICINE

## 2022-05-29 PROCEDURE — 83735 ASSAY OF MAGNESIUM: CPT

## 2022-05-29 PROCEDURE — 36415 COLL VENOUS BLD VENIPUNCTURE: CPT

## 2022-05-29 PROCEDURE — 82962 GLUCOSE BLOOD TEST: CPT

## 2022-05-29 RX ORDER — INSULIN GLARGINE-YFGN 100 [IU]/ML
35 INJECTION, SOLUTION SUBCUTANEOUS EVERY MORNING
Status: DISCONTINUED | OUTPATIENT
Start: 2022-05-29 | End: 2022-05-31 | Stop reason: HOSPADM

## 2022-05-29 RX ORDER — POTASSIUM CHLORIDE 20 MEQ/1
40 TABLET, EXTENDED RELEASE ORAL ONCE
Status: COMPLETED | OUTPATIENT
Start: 2022-05-29 | End: 2022-05-29

## 2022-05-29 RX ADMIN — Medication 500 MG: at 09:53

## 2022-05-29 RX ADMIN — Medication 500 MG: at 12:51

## 2022-05-29 RX ADMIN — INSULIN LISPRO 3 UNITS: 100 INJECTION, SOLUTION INTRAVENOUS; SUBCUTANEOUS at 17:45

## 2022-05-29 RX ADMIN — Medication 10 ML: at 22:17

## 2022-05-29 RX ADMIN — Medication 500 MG: at 17:46

## 2022-05-29 RX ADMIN — Medication 2 PACKET: at 22:20

## 2022-05-29 RX ADMIN — INSULIN LISPRO 12 UNITS: 100 INJECTION, SOLUTION INTRAVENOUS; SUBCUTANEOUS at 17:46

## 2022-05-29 RX ADMIN — INSULIN LISPRO 2 UNITS: 100 INJECTION, SOLUTION INTRAVENOUS; SUBCUTANEOUS at 22:46

## 2022-05-29 RX ADMIN — LISINOPRIL 5 MG: 5 TABLET ORAL at 09:54

## 2022-05-29 RX ADMIN — ATORVASTATIN CALCIUM 40 MG: 40 TABLET, FILM COATED ORAL at 09:54

## 2022-05-29 RX ADMIN — POTASSIUM CHLORIDE 40 MEQ: 20 TABLET, EXTENDED RELEASE ORAL at 09:53

## 2022-05-29 RX ADMIN — PIPERACILLIN AND TAZOBACTAM 3375 MG: 3; .375 INJECTION, POWDER, LYOPHILIZED, FOR SOLUTION INTRAVENOUS at 12:52

## 2022-05-29 RX ADMIN — INSULIN GLARGINE-YFGN 35 UNITS: 100 INJECTION, SOLUTION SUBCUTANEOUS at 09:54

## 2022-05-29 RX ADMIN — INSULIN LISPRO 12 UNITS: 100 INJECTION, SOLUTION INTRAVENOUS; SUBCUTANEOUS at 09:55

## 2022-05-29 RX ADMIN — PIPERACILLIN AND TAZOBACTAM 3375 MG: 3; .375 INJECTION, POWDER, LYOPHILIZED, FOR SOLUTION INTRAVENOUS at 04:15

## 2022-05-29 RX ADMIN — INSULIN LISPRO 12 UNITS: 100 INJECTION, SOLUTION INTRAVENOUS; SUBCUTANEOUS at 13:15

## 2022-05-29 RX ADMIN — ENOXAPARIN SODIUM 30 MG: 100 INJECTION SUBCUTANEOUS at 09:54

## 2022-05-29 RX ADMIN — ENOXAPARIN SODIUM 30 MG: 100 INJECTION SUBCUTANEOUS at 22:17

## 2022-05-29 RX ADMIN — PIPERACILLIN AND TAZOBACTAM 3375 MG: 3; .375 INJECTION, POWDER, LYOPHILIZED, FOR SOLUTION INTRAVENOUS at 22:25

## 2022-05-29 ASSESSMENT — PAIN SCALES - GENERAL: PAINLEVEL_OUTOF10: 0

## 2022-05-29 NOTE — PROGRESS NOTES
[facility] PROGRESS NOTE      Chief complaint: Follow-up of intraabdominal abscess    The patient is a 64 y.o. 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 Anil Vergara and given a course of piperacillin-tazobactam until 10 days after drain removal on 03/23 then cefdinir, presented on 05/25 with fatigue accompanied by abdominal pain and diarrhea for 4 days, found to be in DKA and sepsis with leukocytosis of 20,000, CT abdomen and pelvis showing fluid reaccumulation in the right lower quadrant extraperitoneal collection. CT chest showed clear lungs. He received a dose of vancomycin on admission. Piperacillin-tazobactam was started on admission. He underwent percutaneous drain placement on 05/27. Subjective: Patient was seen and examined. No chills, no abdominal pain, no diarrhea, no rash, no itching. No nausea. Appetite good. No new complaints. Objective:    Vitals:    05/28/22 1945   BP: 100/73   Pulse:    Resp: 20   Temp: 97.8 °F (36.6 °C)   SpO2: 95%     Constitutional: Alert, not in distress  Respiratory: Clear breath sounds, no crackles, no wheezes  Cardiovascular: Regular rate and rhythm, no murmurs  Gastrointestinal: Bowel sounds present, soft, nontender. RLQ percutaneous drain in place  Skin: Warm and dry, no active dermatoses  Musculoskeletal: No joint swelling, no joint erythema    Labs, imaging, and medical records/notes were personally reviewed. Abscess culture GNR and GPO   Blood cx NGTD    Assessment:  Sepsis, resolving, secondary to intraabdominal abscess  DKA    Recommendations:  Continue piperacillin-tazobactam 3.375g q8h. Follow up abscess fluid cultures.   Continue supportive care.     Thank you for involving me in the care of [employer]. Please do not hesitate to call for any questions or concerns. Electronically signed by SCARLETT Kerr CNP on 5/29/2022 at 7:44 AM   Pt seen and examined. Above discussed agree with advanced practice nurse. Labs, cultures, and radiographs reviewed. Face to Face encounter occurred. Changes made as necessary.      Tasha Fitzgerald MD

## 2022-05-29 NOTE — PROGRESS NOTES
GENERAL SURGERY  DAILY PROGRESS NOTE  5/29/2022    Cc: DKA    Subjective:  No acute issues, doing well. IR drain in place. Purulent output    Objective:  /73   Pulse 80   Temp 97.8 °F (36.6 °C) (Oral)   Resp 20   Ht 6' 2\" (1.88 m)   Wt 291 lb 0.1 oz (132 kg)   SpO2 95%   BMI 37.36 kg/m²     General appearance: alert, cooperative and in no acute distress. Lungs: nonlabored breathing on room air  Heart: regular rate  Abdomen: soft, mild RLQtender, non distended. IR drain in RLQ-- purulent drainage   Neurologic: Alert and oriented x 3. Grossly normal  Musculoskeletal: No clubbing cyanosis    Assessment/Plan:  64 y.o. male with history of RLQ abscess s/p IR drain and prolonged antibiotics in March, with recurrent abscess most likely perf appendicitis or diverticulitis, also DKA    IV antibiotics-- per ID   Drain care-- will need [address] for the drain   Continue diet   Patient needs to have colonoscopy after acute inflammation resolved and his drain is out  No other acute surgical care needed. Must follow up for drain care and colonoscopy     Electronically signed by Nitish Santa DO on 5/29/2022 at 6:58 AM     As above.

## 2022-05-29 NOTE — PLAN OF CARE
Problem: Chronic Conditions and Co-morbidities  Goal: Patient's chronic conditions and co-morbidity symptoms are monitored and maintained or improved  5/29/2022 0756 by Axel Benavidez RN  Outcome: Progressing  5/29/2022 0100 by Sylvia Lynn RN  Outcome: Progressing     Problem: Discharge Planning  Goal: Discharge to home or other facility with appropriate resources  5/29/2022 0756 by Axel Benavidez RN  Outcome: Progressing  5/29/2022 0100 by Sylvia Lynn RN  Outcome: Progressing     Problem: Skin/Tissue Integrity  Goal: Absence of new skin breakdown  Description: 1. Monitor for areas of redness and/or skin breakdown  2. Assess vascular access sites hourly  3. Every 4-6 hours minimum:  Change oxygen saturation probe site  4. Every 4-6 hours:  If on nasal continuous positive airway pressure, respiratory therapy assess nares and determine need for appliance change or resting period. 5/29/2022 0756 by Axel Benavidez RN  Outcome: Progressing  5/29/2022 0100 by Sylvia Lynn RN  Outcome: Progressing     Problem: Safety - Adult  Goal: Free from fall injury  5/29/2022 0756 by Axel Benavidez RN  Outcome: Progressing  5/29/2022 0100 by Sylvia Lynn RN  Outcome: Progressing  Flowsheets (Taken 5/29/2022 0059)  Free From Fall Injury: Instruct family/caregiver on patient safety     Problem: ABCDS Injury Assessment  Goal: Absence of physical injury  5/29/2022 0756 by Axel Benavidez RN  Outcome: Progressing  5/29/2022 0100 by Sylvia Lynn RN  Outcome: Progressing  Flowsheets (Taken 5/29/2022 0059)  Absence of Physical Injury: Implement safety measures based on patient assessment     Problem: Pain  Goal: Verbalizes/displays adequate comfort level or baseline comfort level  Outcome: Progressing     Problem: Safety - Medical Restraint  Goal: Remains free of injury from restraints (Restraint for Interference with Medical Device)  Description: INTERVENTIONS:  1.  Determine that other, less restrictive measures have been tried or would not be effective before applying the restraint  2. Evaluate the patient's condition at the time of restraint application  3. Inform patient/family regarding the reason for restraint  4.  Q2H: Monitor safety, psychosocial status, comfort, nutrition and hydration  Outcome: Progressing

## 2022-05-29 NOTE — PROGRESS NOTES
Hospitalist Progress Note      Synopsis: Patient is a 59-year-old male who presented for evaluation of fatigue and nausea.  Patient has a past medical history significant for diabetes with associated comorbidities and a previous intra-abdominal abscess secondary to perforated appendicitis with a recent s/p IR drainage.  Patient states he was feeling fatigued and developed nausea last 24 hours and went to [facility] ED for further evaluation.  CT imaging revealed the return of his intra-abdominal fluid collection in the right lower quadrant.  Patient states that he had 1 episode of diarrhea last week but has otherwise had normal bowel movements.  He was tolerating a diet up until the last 24 hours when he developed nausea.  Patient had labs significant for a beta hydroxybutyrate of 4.50, a glucose of 242, pH of 7.14 the patient was diagnosed with DKA and started on insulin infusion and was admitted to medical intensive care for further management.   22: Patient bridged from IV insulin drip to subcu insulin  22: abd drain placed in IR, fluid cultures sent.   22: Culture growing Streptococcus gordonii and group F beta-Streptococcus      Hospital day 4     Subjective:  Seen and examined at bedside. Explained to patient that we requires continued admission based on his need for IV antibiotics, patient agreeable with plan. Stable overnight. No issues reported. Patient seen and examined  Records reviewed. Temp (24hrs), Av.3 °F (36.3 °C), Min:96.8 °F (36 °C), Max:97.8 °F (36.6 °C)    DIET: ADULT DIET; Regular; 4 carb choices (60 gm/meal)  CODE: Full Code    Intake/Output Summary (Last 24 hours) at 2022 0959  Last data filed at 2022 0645  Gross per 24 hour   Intake 360 ml   Output 10 ml   Net 350 ml       Review of Systems:  General:  Fever, chills, diaphoresis, fatigue, malaise, night sweats, weight loss  Psychological:  Anxiety, disorientation, hallucinations.   ENT:  Epistaxis, headaches, vertigo, visual changes. Cardiovascular:  Chest pain, irregular heartbeats, palpitations, paroxysmal nocturnal dyspnea. Respiratory:  Shortness of breath, coughing, sputum production, hemoptysis, wheezing, orthopnea. Gastrointestinal:  Nausea, vomiting, diarrhea, heartburn, constipation, abdominal pain, hematemesis, hematochezia, melena, acholic stools  Genito-Urinary:  Dysuria, urgency, frequency, hematuria  Musculoskeletal:  Joint pain, joint stiffness, joint swelling, muscle pain  Neurology:  Headache, focal neurological deficits, weakness, numbness, paresthesia  Derm:  Rashes, ulcers, excoriations, bruising  Extremities:  Decreased ROM, peripheral edema, mottling    Objective:    /65   Pulse 81   Temp 96.8 °F (36 °C) (Temporal)   Resp 18   Ht 6' 2\" (1.88 m)   Wt 291 lb 0.1 oz (132 kg)   SpO2 96%   BMI 37.36 kg/m²   General appearance: Obese middle-aged man in no apparent distress, appears stated age and cooperative. Respiratory:  Clear to auscultation bilaterally.  Normal respiratory effort. Cardiovascular:  RRR. S1, S2 without MRG. PV: Pulses palpable. No edema. Abdomen: Soft, non-tender, non-distended. +BS  Musculoskeletal: No obvious deformities. Skin: Normal skin color.  No rashes or lesions. Good turgor. Neurologic:  Grossly non-focal. Awake, alert, following commands.    Psychiatric: Alert and oriented, thought content appropriate, normal insight and judgement    Medications:  REVIEWED DAILY    Infusion Medications    sodium chloride       Scheduled Medications    potassium & sodium phosphates  2 packet Oral 4x Daily    insulin glargine  35 Units SubCUTAneous QAM    insulin lispro  0-18 Units SubCUTAneous TID WC    insulin lispro  0-9 Units SubCUTAneous Nightly    insulin lispro  12 Units SubCUTAneous TID     sodium chloride flush  5-40 mL IntraVENous 2 times per day    piperacillin-tazobactam  3,375 mg IntraVENous Q8H    enoxaparin  30 mg SubCUTAneous BID    atorvastatin  40 mg Oral Daily    lisinopril  5 mg Oral Daily     PRN Meds: sodium chloride flush, sodium chloride, polyethylene glycol, acetaminophen **OR** acetaminophen, ondansetron **OR** ondansetron, glucose, glucagon (rDNA), dextrose bolus **OR** dextrose bolus, potassium chloride, magnesium sulfate    Labs:     Recent Labs     05/27/22  0625 05/28/22  0511 05/29/22  0442   WBC 12.9* 9.0 8.3   HGB 13.2 13.8 13.2   HCT 38.9 40.3 38.5    331 313       Recent Labs     05/27/22  1700 05/27/22  1950 05/28/22  0511 05/28/22  1137 05/28/22  1635 05/29/22  0442   NA   < >  --  136  --  139 138   K   < >  --  3.3*  --  3.1* 3.2*   CL   < >  --  103  --  105 102   CO2   < >  --  20*  --  24 24   BUN   < >  --  4*  --  3* 4*   CREATININE   < >  --  0.5*  --  0.5* 0.6*   CALCIUM   < >  --  8.0*  --  8.0* 7.9*   PHOS  --  1.1* 1.4* 1.0*  --   --     < > = values in this interval not displayed. No results for input(s): PROT, ALB, ALKPHOS, ALT, AST, BILITOT, AMYLASE, LIPASE in the last 72 hours. No results for input(s): INR in the last 72 hours. No results for input(s): Jadene Moh in the last 72 hours. Chronic labs:    Lab Results   Component Value Date    CHOL 167 08/10/2015    TRIG 153 (H) 08/10/2015    HDL 29 08/10/2015    LDLCALC 107 (H) 08/10/2015    TSH 1.300 03/07/2022    INR 1.2 05/25/2022    LABA1C 11.5 (H) 03/06/2022       Radiology: REVIEWED DAILY    Assessment:  · Intra-abdominal abscess  · Sepsis, secondary to intra-abdominal abscess  · Diabetic ketoacidosis, resolved.    · A1C 11.5%  · Leukocytosis WBC 14.8 today  · Hypertension  · Hyperlipidemia  · Hypokalemia potassium 3.3    Plan:  · General surgery following  · Infectious disease following  · Zosyn and vancomycin  · Blood cultures pending  · Abscess drainage with interventional radiology drain placement complete, body fluid cultures pending  · Insulin drip stopped bridged to subcu insulin  · Endocrinology following, titrating insulin based on blood glucose trend and insulin requirements  · Monitor serum electrolytes  · Continue home medications  · Potassium replacement protocol ordered  · colonoscopy after acute inflammation resolved and his drain is out as outpatient. DVT Prophylaxis [x] Lovenox  []  Heparin [] DOAC [] PCDs [] Ambulation    GI Prophylaxis [x] PPI  [] H2 Blocker   [] Carafate  [x] Diet/Tube Feeds   Level of care [] Med/Surg  [x] Intermediate  []  ICU   Diet ADULT DIET; Regular; 4 carb choices (60 gm/meal)    Family contact [x]  N/A    [] At bedside  [] Phone call   Disposition Patient requires continued admission further management of intra-abdominal abscess and cultures of body fluid   MDM [] Low    [x] Moderate  []   High       Discharge Plan: Discharged to home pending clinical improvement    +++++++++++++++++++++++++++++++++++++++++++++++++  IRA Sandhu/ [address]  +++++++++++++++++++++++++++++++++++++++++++++++++  NOTE: This report was transcribed using voice recognition software. Every effort was made to ensure accuracy; however, inadvertent computerized transcription errors may be present.

## 2022-05-29 NOTE — PROGRESS NOTES
ENDOCRINOLOGY PROGRESS NOTE      Date of admission: 5/25/2022  Date of service: 5/29/2022  Admitting physician: Michelle Salter MD   Primary Care Physician: Marlee Acosta MD  Consultant physician: Katie Joy MD     Reason for the consultation:  Uncontrolled DM    History of Present Illness: The history is provided by the patient. Accuracy of the patient data is excellent    Kalpana Garcia is a very pleasant 64 y.o. old male with PMH of Obesity, DM type 2, HLD and other listed below admitted to [facility] on 5/25/2022 because of abdominal pain,  nausea, vomiting and fatigue , endocrine service was consulted for diabetes management.      Subjective   No acute issues overnight, BG improving     Point of care glucose monitoring   (Independently reviewed)   Recent Labs     05/27/22  1622 05/27/22  2002 05/28/22  1212 05/28/22  1637 05/28/22  1949 05/28/22  2335 05/29/22  0630 05/29/22  1250   GLUMET 189* 218* 149* 140* 107* 108* 135* 111*     Scheduled Meds:   potassium & sodium phosphates  2 packet Oral 4x Daily    insulin glargine  35 Units SubCUTAneous QAM    insulin lispro  0-18 Units SubCUTAneous TID WC    insulin lispro  0-9 Units SubCUTAneous Nightly    insulin lispro  12 Units SubCUTAneous TID WC    sodium chloride flush  5-40 mL IntraVENous 2 times per day    piperacillin-tazobactam  3,375 mg IntraVENous Q8H    enoxaparin  30 mg SubCUTAneous BID    atorvastatin  40 mg Oral Daily    lisinopril  5 mg Oral Daily       PRN Meds:   sodium chloride flush, 5-40 mL, PRN  sodium chloride, , PRN  polyethylene glycol, 17 g, Daily PRN  acetaminophen, 650 mg, Q6H PRN   Or  acetaminophen, 650 mg, Q6H PRN  ondansetron, 4 mg, Q8H PRN   Or  ondansetron, 4 mg, Q6H PRN  glucose, 4 tablet, PRN  glucagon (rDNA), 1 mg, PRN  dextrose bolus, 125 mL, PRN   Or  dextrose bolus, 250 mL, PRN  potassium chloride, 10 mEq, PRN  magnesium sulfate, 1,000 mg, PRN      Continuous Infusions:   sodium chloride         Review of 1.300 03/07/2022 05:25 AM     Lab Results   Component Value Date    LABA1C 11.5 03/06/2022    GLUCOSE 184 05/29/2022    GLUCOSE 191 04/01/2011    MALBCR 101.7 03/29/2022    LABCREA 87.53 03/29/2022     Lab Results   Component Value Date    TRIG 153 08/10/2015    HDL 29 08/10/2015    LDLCALC 107 08/10/2015    CHOL 167 08/10/2015       Blood culture   Lab Results   Component Value Date    BC 24 Hours no growth 05/26/2022    BC 24 Hours no growth 05/25/2022       Radiology:  CT ABSCESS DRAINAGE W CATH PLACEMENT S&I   Final Result   Successful uncomplicated  abscess drainage. Recommendations:   1. Follow-up tube check in 2 weeks   2. Flush tube daily with 5 to 10 mL of sterile saline             CT ABDOMEN PELVIS W IV CONTRAST Additional Contrast? None   Final Result   1. There has been reaccumulation of fluid in the right lower quadrant   extraperitoneal collection since prior examination with interval removal of   percutaneous drainage catheter. 2. Colonic diverticulosis without evidence of diverticulitis. CT CHEST WO CONTRAST   Final Result   1. Mildly prominent pulmonary artery, consider pulmonary artery hypertension. 2.  Wall thickening of the mid and distal esophagus, consider esophagitis. 3.  Lungs are clear. XR CHEST PORTABLE   Final Result   No acute process.              Medical Records/Labs/Images review:   I personally reviewed and summarized previous records   All labs and imaging were reviewed independently     Elba Laniere, a 64 y.o.-old male seen today for inpatient diabetes management     Diabetes Mellitus type 2 admitted with DKA   · Patient's diabetes is uncontrolled, A1c 11.5%  · Will adjust DM regimen to   · Lantus 35 u nightly   · Humalog 12u with meals   · high dose slidng scale   · Will titrate insulin dose based on the blood glucose trend & insulin requirement  · Will arrange for patient to be seen in endocrinology clinic upon discharge for routine diabetes maintenance and prevention. Recurrent Rt abdominal abscess   · Managed by primary and surgery service     Thank you for allowing us to participate in the care of this patient. Please do not hesitate to contact us with any additional questions. Gui Morales MD  Endocrinologist, [facility] and Endocr[facility]ology   [address] [place] [address]   Phone: [phone]  Fax: [phone]  --------------------------------------------  An electronic signature was used to authenticate this note.  Shannon Alicia MD on 5/29/2022 at 3:36 PM

## 2022-05-30 LAB
ANAEROBIC CULTURE: ABNORMAL
ANION GAP SERPL CALCULATED.3IONS-SCNC: 10 MMOL/L (ref 7–16)
ANION GAP SERPL CALCULATED.3IONS-SCNC: 12 MMOL/L (ref 7–16)
BASOPHILS ABSOLUTE: 0.1 E9/L (ref 0–0.2)
BASOPHILS RELATIVE PERCENT: 1.3 % (ref 0–2)
BLOOD CULTURE, ROUTINE: NORMAL
BUN BLDV-MCNC: 5 MG/DL (ref 6–20)
BUN BLDV-MCNC: 5 MG/DL (ref 6–20)
CALCIUM SERPL-MCNC: 8.4 MG/DL (ref 8.6–10.2)
CALCIUM SERPL-MCNC: 8.5 MG/DL (ref 8.6–10.2)
CHLORIDE BLD-SCNC: 101 MMOL/L (ref 98–107)
CHLORIDE BLD-SCNC: 102 MMOL/L (ref 98–107)
CO2: 25 MMOL/L (ref 22–29)
CO2: 27 MMOL/L (ref 22–29)
CREAT SERPL-MCNC: 0.6 MG/DL (ref 0.7–1.2)
CREAT SERPL-MCNC: 0.6 MG/DL (ref 0.7–1.2)
EOSINOPHILS ABSOLUTE: 0 E9/L (ref 0.05–0.5)
EOSINOPHILS RELATIVE PERCENT: 0 % (ref 0–6)
GFR AFRICAN AMERICAN: >60
GFR AFRICAN AMERICAN: >60
GFR NON-AFRICAN AMERICAN: >60 ML/MIN/1.73
GFR NON-AFRICAN AMERICAN: >60 ML/MIN/1.73
GLUCOSE BLD-MCNC: 135 MG/DL (ref 74–99)
GLUCOSE BLD-MCNC: 155 MG/DL (ref 74–99)
HCT VFR BLD CALC: 42.6 % (ref 37–54)
HEMOGLOBIN: 13.8 G/DL (ref 12.5–16.5)
IMMATURE GRANULOCYTES #: 0.16 E9/L
IMMATURE GRANULOCYTES %: 2 % (ref 0–5)
LYMPHOCYTES ABSOLUTE: 1.88 E9/L (ref 1.5–4)
LYMPHOCYTES RELATIVE PERCENT: 23.9 % (ref 20–42)
MAGNESIUM: 2.1 MG/DL (ref 1.6–2.6)
MAGNESIUM: 2.2 MG/DL (ref 1.6–2.6)
MCH RBC QN AUTO: 28.4 PG (ref 26–35)
MCHC RBC AUTO-ENTMCNC: 32.4 % (ref 32–34.5)
MCV RBC AUTO: 87.7 FL (ref 80–99.9)
METER GLUCOSE: 118 MG/DL (ref 74–99)
METER GLUCOSE: 120 MG/DL (ref 74–99)
METER GLUCOSE: 126 MG/DL (ref 74–99)
METER GLUCOSE: 139 MG/DL (ref 74–99)
MONOCYTES ABSOLUTE: 0.76 E9/L (ref 0.1–0.95)
MONOCYTES RELATIVE PERCENT: 9.7 % (ref 2–12)
NEUTROPHILS ABSOLUTE: 4.96 E9/L (ref 1.8–7.3)
NEUTROPHILS RELATIVE PERCENT: 63.1 % (ref 43–80)
ORGANISM: ABNORMAL
PDW BLD-RTO: 14.8 FL (ref 11.5–15)
PLATELET # BLD: 304 E9/L (ref 130–450)
PMV BLD AUTO: 9.5 FL (ref 7–12)
POTASSIUM SERPL-SCNC: 3.6 MMOL/L (ref 3.5–5)
POTASSIUM SERPL-SCNC: 3.8 MMOL/L (ref 3.5–5)
RBC # BLD: 4.86 E12/L (ref 3.8–5.8)
SODIUM BLD-SCNC: 138 MMOL/L (ref 132–146)
SODIUM BLD-SCNC: 139 MMOL/L (ref 132–146)
WBC # BLD: 7.9 E9/L (ref 4.5–11.5)

## 2022-05-30 PROCEDURE — 2580000003 HC RX 258: Performed by: STUDENT IN AN ORGANIZED HEALTH CARE EDUCATION/TRAINING PROGRAM

## 2022-05-30 PROCEDURE — 80048 BASIC METABOLIC PNL TOTAL CA: CPT

## 2022-05-30 PROCEDURE — 1200000000 HC SEMI PRIVATE

## 2022-05-30 PROCEDURE — 76937 US GUIDE VASCULAR ACCESS: CPT

## 2022-05-30 PROCEDURE — 82962 GLUCOSE BLOOD TEST: CPT

## 2022-05-30 PROCEDURE — C1751 CATH, INF, PER/CENT/MIDLINE: HCPCS

## 2022-05-30 PROCEDURE — 6370000000 HC RX 637 (ALT 250 FOR IP): Performed by: NURSE PRACTITIONER

## 2022-05-30 PROCEDURE — 6370000000 HC RX 637 (ALT 250 FOR IP): Performed by: INTERNAL MEDICINE

## 2022-05-30 PROCEDURE — 36415 COLL VENOUS BLD VENIPUNCTURE: CPT

## 2022-05-30 PROCEDURE — 83735 ASSAY OF MAGNESIUM: CPT

## 2022-05-30 PROCEDURE — 85025 COMPLETE CBC W/AUTO DIFF WBC: CPT

## 2022-05-30 PROCEDURE — 99232 SBSQ HOSP IP/OBS MODERATE 35: CPT | Performed by: SURGERY

## 2022-05-30 PROCEDURE — 02HV33Z INSERTION OF INFUSION DEVICE INTO SUPERIOR VENA CAVA, PERCUTANEOUS APPROACH: ICD-10-PCS | Performed by: INTERNAL MEDICINE

## 2022-05-30 PROCEDURE — 2580000003 HC RX 258: Performed by: NURSE PRACTITIONER

## 2022-05-30 PROCEDURE — 36569 INSJ PICC 5 YR+ W/O IMAGING: CPT

## 2022-05-30 PROCEDURE — 6370000000 HC RX 637 (ALT 250 FOR IP): Performed by: STUDENT IN AN ORGANIZED HEALTH CARE EDUCATION/TRAINING PROGRAM

## 2022-05-30 PROCEDURE — S5553 INSULIN LONG ACTING 5 U: HCPCS | Performed by: INTERNAL MEDICINE

## 2022-05-30 PROCEDURE — 2500000003 HC RX 250 WO HCPCS: Performed by: NURSE PRACTITIONER

## 2022-05-30 PROCEDURE — 6360000002 HC RX W HCPCS: Performed by: NURSE PRACTITIONER

## 2022-05-30 PROCEDURE — 6360000002 HC RX W HCPCS: Performed by: STUDENT IN AN ORGANIZED HEALTH CARE EDUCATION/TRAINING PROGRAM

## 2022-05-30 RX ORDER — LINEZOLID 600 MG/1
600 TABLET, FILM COATED ORAL EVERY 12 HOURS SCHEDULED
Status: DISCONTINUED | OUTPATIENT
Start: 2022-05-30 | End: 2022-05-31 | Stop reason: HOSPADM

## 2022-05-30 RX ORDER — SODIUM CHLORIDE 0.9 % (FLUSH) 0.9 %
5-40 SYRINGE (ML) INJECTION PRN
Status: DISCONTINUED | OUTPATIENT
Start: 2022-05-30 | End: 2022-05-31 | Stop reason: HOSPADM

## 2022-05-30 RX ORDER — LIDOCAINE HYDROCHLORIDE 10 MG/ML
5 INJECTION, SOLUTION EPIDURAL; INFILTRATION; INTRACAUDAL; PERINEURAL ONCE
Status: COMPLETED | OUTPATIENT
Start: 2022-05-30 | End: 2022-05-30

## 2022-05-30 RX ORDER — HEPARIN SODIUM (PORCINE) LOCK FLUSH IV SOLN 100 UNIT/ML 100 UNIT/ML
3 SOLUTION INTRAVENOUS PRN
Status: DISCONTINUED | OUTPATIENT
Start: 2022-05-30 | End: 2022-05-31 | Stop reason: HOSPADM

## 2022-05-30 RX ORDER — SODIUM CHLORIDE 9 MG/ML
INJECTION, SOLUTION INTRAVENOUS PRN
Status: DISCONTINUED | OUTPATIENT
Start: 2022-05-30 | End: 2022-05-31 | Stop reason: HOSPADM

## 2022-05-30 RX ORDER — LINEZOLID 600 MG/1
600 TABLET, FILM COATED ORAL EVERY 12 HOURS SCHEDULED
Qty: 28 TABLET | Refills: 0 | Status: SHIPPED | OUTPATIENT
Start: 2022-05-30 | End: 2022-06-13

## 2022-05-30 RX ORDER — HEPARIN SODIUM (PORCINE) LOCK FLUSH IV SOLN 100 UNIT/ML 100 UNIT/ML
3 SOLUTION INTRAVENOUS EVERY 12 HOURS SCHEDULED
Status: DISCONTINUED | OUTPATIENT
Start: 2022-05-30 | End: 2022-05-31 | Stop reason: HOSPADM

## 2022-05-30 RX ORDER — SODIUM CHLORIDE 0.9 % (FLUSH) 0.9 %
5-40 SYRINGE (ML) INJECTION EVERY 12 HOURS SCHEDULED
Status: DISCONTINUED | OUTPATIENT
Start: 2022-05-30 | End: 2022-05-31 | Stop reason: HOSPADM

## 2022-05-30 RX ADMIN — INSULIN GLARGINE-YFGN 35 UNITS: 100 INJECTION, SOLUTION SUBCUTANEOUS at 08:55

## 2022-05-30 RX ADMIN — ERTAPENEM SODIUM 1000 MG: 1 INJECTION, POWDER, LYOPHILIZED, FOR SOLUTION INTRAMUSCULAR; INTRAVENOUS at 11:30

## 2022-05-30 RX ADMIN — LIDOCAINE HYDROCHLORIDE 5 ML: 10 INJECTION, SOLUTION EPIDURAL; INFILTRATION; INTRACAUDAL; PERINEURAL at 10:30

## 2022-05-30 RX ADMIN — ATORVASTATIN CALCIUM 40 MG: 40 TABLET, FILM COATED ORAL at 08:54

## 2022-05-30 RX ADMIN — PIPERACILLIN AND TAZOBACTAM 3375 MG: 3; .375 INJECTION, POWDER, LYOPHILIZED, FOR SOLUTION INTRAVENOUS at 06:29

## 2022-05-30 RX ADMIN — ENOXAPARIN SODIUM 30 MG: 100 INJECTION SUBCUTANEOUS at 21:06

## 2022-05-30 RX ADMIN — INSULIN LISPRO 12 UNITS: 100 INJECTION, SOLUTION INTRAVENOUS; SUBCUTANEOUS at 08:55

## 2022-05-30 RX ADMIN — Medication 10 ML: at 21:33

## 2022-05-30 RX ADMIN — Medication 500 MG: at 17:28

## 2022-05-30 RX ADMIN — Medication 300 UNITS: at 21:07

## 2022-05-30 RX ADMIN — Medication 500 MG: at 21:08

## 2022-05-30 RX ADMIN — Medication 10 ML: at 08:55

## 2022-05-30 RX ADMIN — INSULIN LISPRO 12 UNITS: 100 INJECTION, SOLUTION INTRAVENOUS; SUBCUTANEOUS at 13:00

## 2022-05-30 RX ADMIN — ENOXAPARIN SODIUM 30 MG: 100 INJECTION SUBCUTANEOUS at 08:54

## 2022-05-30 RX ADMIN — LINEZOLID 600 MG: 600 TABLET, FILM COATED ORAL at 11:30

## 2022-05-30 RX ADMIN — LISINOPRIL 5 MG: 5 TABLET ORAL at 08:55

## 2022-05-30 RX ADMIN — LINEZOLID 600 MG: 600 TABLET, FILM COATED ORAL at 21:08

## 2022-05-30 RX ADMIN — Medication 500 MG: at 08:54

## 2022-05-30 RX ADMIN — Medication 500 MG: at 13:00

## 2022-05-30 NOTE — PROGRESS NOTES
[facility] PROGRESS NOTE      Chief complaint: Follow-up of intraabdominal abscess    The patient is a 64 y.o. 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 Coreen Henson and given a course of piperacillin-tazobactam until 10 days after drain removal on 03/23 then cefdinir, presented on 05/25 with fatigue accompanied by abdominal pain and diarrhea for 4 days, found to be in DKA and sepsis with leukocytosis of 20,000, CT abdomen and pelvis showing fluid reaccumulation in the right lower quadrant extraperitoneal collection. CT chest showed clear lungs. He received a dose of vancomycin on admission. Piperacillin-tazobactam was started on admission. He underwent percutaneous drain placement on 05/27. Subjective: Patient was seen and examined. No chills, no abdominal pain, no diarrhea, no rash, no itching. No nausea. Appetite good. No new complaints. Continues to feel good. Objective:    Vitals:    05/29/22 2030   BP: 126/75   Pulse: 80   Resp: 18   Temp: 97.9 °F (36.6 °C)   SpO2: 95%     Constitutional: Alert, not in distress  Respiratory: Clear breath sounds, no crackles, no wheezes  Cardiovascular: Regular rate and rhythm, no murmurs  Gastrointestinal: Bowel sounds present, soft, nontender. RLQ percutaneous drain in place  Skin: Warm and dry, no active dermatoses  Musculoskeletal: No joint swelling, no joint erythema    Labs, imaging, and medical records/notes were personally reviewed. Abscess culture GNR and GPO   Blood cx NGTD    Assessment:  Sepsis, resolving, secondary to intraabdominal abscess  DKA    Recommendations:  stop piperacillin-tazobactam 3.375g q8h.  Switch to Invanz  and zyvox for discharge. Place picc  Will need min 2 weeks, need office follow up before antibiotic stopped  Drain check per IR  Follow up abscess fluid cultures-enterococcus faecium(amp resistant) and klebsiella aerogenes, evaluating for anaerobes  Continue supportive care.     Thank you for involving me in the care of "[employer]. Please do not hesitate to call for any questions or concerns. Electronically signed by SCARLETT Osborne CNP on 5/30/2022 at 7:51 AM   Pt seen and examined. Above discussed agree with advanced practice nurse. Labs, cultures, and radiographs reviewed. Face to Face encounter occurred. Changes made as necessary.      Ayaan Milligan MD

## 2022-05-30 NOTE — PROGRESS NOTES
Hospitalist Progress Note      Synopsis: Patient is a 60-year-old male who presented for evaluation of fatigue and nausea.  Patient has a past medical history significant for diabetes with associated comorbidities and a previous intra-abdominal abscess secondary to perforated appendicitis with a recent s/p IR drainage.  Patient states he was feeling fatigued and developed nausea last 24 hours and went to [facility] ED for further evaluation.  CT imaging revealed the return of his intra-abdominal fluid collection in the right lower quadrant.  Patient states that he had 1 episode of diarrhea last week but has otherwise had normal bowel movements.  He was tolerating a diet up until the last 24 hours when he developed nausea.  Patient had labs significant for a beta hydroxybutyrate of 4.50, a glucose of 242, pH of 7.14 the patient was diagnosed with DKA and started on insulin infusion and was admitted to medical intensive care for further management.   22: Patient bridged from IV insulin drip to subcu insulin  22: abd drain placed in IR, fluid cultures sent.   22: Culture growing Streptococcus gordonii and group F beta-Streptococcus      Hospital day 5     Subjective:  Stable overnight. No issues reported. Patient seen and examined  Records reviewed. Temp (24hrs), Av.1 °F (36.7 °C), Min:97.9 °F (36.6 °C), Max:98.2 °F (36.8 °C)    DIET: ADULT DIET; Regular; 4 carb choices (60 gm/meal)  CODE: Full Code    Intake/Output Summary (Last 24 hours) at 2022 0911  Last data filed at 2022 0439  Gross per 24 hour   Intake --   Output 1225 ml   Net -1225 ml       Review of Systems: All bolded are positive; please see HPI  General:  Fever, chills, diaphoresis, fatigue, malaise, night sweats, weight loss  Psychological:  Anxiety, disorientation, hallucinations. ENT:  Epistaxis, headaches, vertigo, visual changes.   Cardiovascular:  Chest pain, irregular heartbeats, palpitations, paroxysmal nocturnal dyspnea. Respiratory:  Shortness of breath, coughing, sputum production, hemoptysis, wheezing, orthopnea. Gastrointestinal:  Nausea, vomiting, diarrhea, heartburn, constipation, abdominal pain, hematemesis, hematochezia, melena, acholic stools  Genito-Urinary:  Dysuria, urgency, frequency, hematuria  Musculoskeletal:  Joint pain, joint stiffness, joint swelling, muscle pain  Neurology:  Headache, focal neurological deficits, weakness, numbness, paresthesia  Derm:  Rashes, ulcers, excoriations, bruising  Extremities:  Decreased ROM, peripheral edema, mottling    Objective:    /72   Pulse 81   Temp 98.2 °F (36.8 °C) (Oral)   Resp 16   Ht 6' 2\" (1.88 m)   Wt 288 lb 8 oz (130.9 kg)   SpO2 95%   BMI 37.04 kg/m²     General appearance: No apparent distress, appears stated age and cooperative. HEENT: Conjunctivae/corneas clear. Mucous membranes moist.  Neck: Supple. No JVD. Respiratory:  Clear to auscultation bilaterally. Normal respiratory effort. Cardiovascular:  RRR. S1, S2 without MRG. PV: Pulses palpable. No edema. Abdomen: Soft, non-tender, non-distended. +BS  Musculoskeletal: No obvious deformities. Skin: Normal skin color. No rashes or lesions. Good turgor. Neurologic:  Grossly non-focal. Awake, alert, following commands.    Psychiatric: Alert and oriented, thought content appropriate, normal insight and judgement    Medications:  REVIEWED DAILY    Infusion Medications    sodium chloride      sodium chloride       Scheduled Medications    linezolid  600 mg Oral 2 times per day    lidocaine PF  5 mL IntraDERmal Once    sodium chloride flush  5-40 mL IntraVENous 2 times per day    heparin flush  3 mL IntraVENous 2 times per day    ertapenem (INVanz) IVPB  1,000 mg IntraVENous Q24H    potassium & sodium phosphates  2 packet Oral 4x Daily    insulin glargine  35 Units SubCUTAneous QAM    insulin lispro  0-18 Units SubCUTAneous TID WC    insulin lispro  0-9 Units SubCUTAneous Nightly    insulin lispro  12 Units SubCUTAneous TID     sodium chloride flush  5-40 mL IntraVENous 2 times per day    enoxaparin  30 mg SubCUTAneous BID    atorvastatin  40 mg Oral Daily    lisinopril  5 mg Oral Daily     PRN Meds: sodium chloride flush, sodium chloride, heparin flush, sodium chloride flush, sodium chloride, polyethylene glycol, acetaminophen **OR** acetaminophen, ondansetron **OR** ondansetron, glucose, glucagon (rDNA), dextrose bolus **OR** dextrose bolus, potassium chloride, magnesium sulfate    Labs:     Recent Labs     05/28/22  0511 05/29/22  0442 05/30/22  0725   WBC 9.0 8.3 7.9   HGB 13.8 13.2 13.8   HCT 40.3 38.5 42.6    313 304       Recent Labs     05/27/22  1700 05/27/22  1950 05/28/22  0511 05/28/22  1137 05/28/22  1635 05/29/22  0442 05/29/22  1603 05/30/22  0725   NA   < >  --  136  --    < > 138 141 139   K   < >  --  3.3*  --    < > 3.2* 3.5 3.8   CL   < >  --  103  --    < > 102 103 102   CO2   < >  --  20*  --    < > 24 28 25   BUN   < >  --  4*  --    < > 4* 4* 5*   CREATININE   < >  --  0.5*  --    < > 0.6* 0.5* 0.6*   CALCIUM   < >  --  8.0*  --    < > 7.9* 8.3* 8.4*   PHOS  --  1.1* 1.4* 1.0*  --   --   --   --     < > = values in this interval not displayed. No results for input(s): PROT, ALB, ALKPHOS, ALT, AST, BILITOT, AMYLASE, LIPASE in the last 72 hours. No results for input(s): INR in the last 72 hours. No results for input(s): Colleen Boaz in the last 72 hours. Chronic labs:    Lab Results   Component Value Date    CHOL 167 08/10/2015    TRIG 153 (H) 08/10/2015    HDL 29 08/10/2015    LDLCALC 107 (H) 08/10/2015    TSH 1.300 03/07/2022    INR 1.2 05/25/2022    LABA1C 11.5 (H) 03/06/2022       Radiology: REVIEWED DAILY    Assessment:  · Intra-abdominal abscess  · Sepsis, secondary to intra-abdominal abscess  · Diabetic ketoacidosis, resolved.    · A1C 11.5%  · Leukocytosis WBC 14.8 today  · Hypertension  · Hyperlipidemia  · Hypokalemia potassium 3.3    Plan:  · General surgery following  · Infectious disease following  · Stop Zosyn, switch to Invanz and Zyvox per ID  · PICC line placement  · Blood cultures pending  · Abscess drainage with interventional radiology drain placement complete, body fluid cultures pending  · Insulin drip stopped bridged to subcu insulin  · Endocrinology following, titrating insulin based on blood glucose trend and insulin requirements  · Monitor serum electrolytes  · Continue home medications  · Potassium replacement protocol ordered  · colonoscopy after acute inflammation resolved and his drain is out as outpatient. DVT Prophylaxis [x] Lovenox  []  Heparin [] DOAC [] PCDs [] Ambulation    GI Prophylaxis [x] PPI  [] H2 Blocker   [] Carafate  [] Diet/Tube Feeds   Level of care [] Med/Surg  [x] Intermediate  []  ICU   Diet ADULT DIET; Regular; 4 carb choices (60 gm/meal)    Family contact [x]  N/A    [] At bedside  [] Phone call   Disposition Patient requires continued admission further management of intra-abdominal abscess cultures of body fluid   MDM [] Low    [x] Moderate  []   High       Discharge Plan: Discharged to home pending clinical improvement    +++++++++++++++++++++++++++++++++++++++++++++++++  IRA Zacarias/ Samy [address]  +++++++++++++++++++++++++++++++++++++++++++++++++  NOTE: This report was transcribed using voice recognition software. Every effort was made to ensure accuracy; however, inadvertent computerized transcription errors may be present.

## 2022-05-30 NOTE — PROGRESS NOTES
GENERAL SURGERY  DAILY PROGRESS NOTE  5/30/2022    Cc: DKA    Subjective:  Patient reports good pain control. He is tolerating his diet. He is passing gas and having bowel movements. Objective:  /75   Pulse 80   Temp 97.9 °F (36.6 °C) (Oral)   Resp 18   Ht 6' 2\" (1.88 m)   Wt 288 lb 8 oz (130.9 kg)   SpO2 95%   BMI 37.04 kg/m²     General appearance: alert, cooperative and in no acute distress. Lungs: nonlabored breathing on room air  Heart: regular rate  Abdomen: soft, mild RLQtender, non distended. IR drain in RLQ-- purulent drainage   Neurologic: Alert and oriented x 3. Grossly normal  Musculoskeletal: No clubbing cyanosis    Assessment/Plan:  64 y.o. male with history of RLQ abscess s/p IR drain and prolonged antibiotics in March, with recurrent abscess most likely perf appendicitis or diverticulitis, also DKA    IV antibiotics-- per ID   Drain care-- will need [address] for the drain and f/u with IR for drain check  Continue diet   Patient needs to have colonoscopy after acute inflammation resolved and his drain is out  No other acute surgical care needed.  Must follow up for drain care and colonoscopy     Electronically signed by Emeterio Erickson MD on 5/30/2022 at 7:29 AM     General Surgery Progress Note  [facility]    Patient's Name/Date of Birth: John Hill / 1965    Date: May 30, 2022     Surgeon: Miguel Ángel Salazar MD    Chief Complaint: Right lower quadrant abscess    Patient Active Problem List   Diagnosis    Diabetic foot ulcer ([address]    Diabetes mellitus ([address]    Osteomyelitis of ankle or foot, left, acute ([address]    Cellulitis of foot    Diabetic arthropathy ([address]    Gangrene of toe of left foot ([address]    Morbid (severe) obesity due to excess calories ([address]    Pure hypertriglyceridemia    Diabetic foot infection ([address]    DVT prophylaxis    ISAIAS (acute kidney injury) ([address]    Intra-abdominal abscess ([address]    Vitamin D deficiency    PINEDA (obstructive sleep apnea)  DKA, type 2, not at goal [facility])       Subjective: Doing well. No complaints. Objective:  /72   Pulse 81   Temp 98.2 °F (36.8 °C) (Oral)   Resp 16   Ht 6' 2\" (1.88 m)   Wt 288 lb 8 oz (130.9 kg)   SpO2 95%   BMI 37.04 kg/m²   Labs:  Recent Labs     05/28/22  0511 05/29/22  0442 05/30/22  0725   WBC 9.0 8.3 7.9   HGB 13.8 13.2 13.8   HCT 40.3 38.5 42.6     Lab Results   Component Value Date    CREATININE 0.6 (L) 05/30/2022    BUN 5 (L) 05/30/2022     05/30/2022    K 3.8 05/30/2022     05/30/2022    CO2 25 05/30/2022     No results for input(s): LIPASE, AMYLASE in the last 72 hours. CBC with Differential:    Lab Results   Component Value Date    WBC 7.9 05/30/2022    RBC 4.86 05/30/2022    HGB 13.8 05/30/2022    HCT 42.6 05/30/2022     05/30/2022    MCV 87.7 05/30/2022    MCH 28.4 05/30/2022    MCHC 32.4 05/30/2022    RDW 14.8 05/30/2022    LYMPHOPCT 23.9 05/30/2022    MONOPCT 9.7 05/30/2022    BASOPCT 1.3 05/30/2022    MONOSABS 0.76 05/30/2022    LYMPHSABS 1.88 05/30/2022    EOSABS 0.00 05/30/2022    BASOSABS 0.10 05/30/2022     BMP:    Lab Results   Component Value Date     05/30/2022    K 3.8 05/30/2022    K 3.4 03/11/2022     05/30/2022    CO2 25 05/30/2022    BUN 5 05/30/2022    LABALBU 3.6 05/25/2022    LABALBU 4.4 04/01/2011    CREATININE 0.6 05/30/2022    CALCIUM 8.4 05/30/2022    GFRAA >60 05/30/2022    LABGLOM >60 05/30/2022    GLUCOSE 155 05/30/2022    GLUCOSE 191 04/01/2011       General appearance:  NAD  Head: NCAT, PERRLA, EOMI, red conjunctiva  Neck: supple, no masses  Lungs: CTAB, equal chest rise bilateral  Heart: Reg rate  Abdomen: soft, nondistended, nontender.   Right lower quadrant drain in place with purulent material  Skin; no lesions  Gu: no cva tenderness  Extremities: extremities normal, atraumatic, no cyanosis or edema      Assessment/Plan:  Medardo Alexandra is a 64 y.o. male with recurrent right lower quadrant periappendiceal abscess status post IR drain    Drain output noted, purulent but output decreasing  Antibiotics per infectious disease. E. Faecium and Klebsiella -  PICC and invanz and zyvox per ID  Diet as tolerated  We discussed the need for follow up with colonoscopy in 6-8 weeks with or without interval appendectomy/ileocecectomy.       Almas Roach MD  5/30/2022  10:03 AM

## 2022-05-30 NOTE — PLAN OF CARE
Problem: Chronic Conditions and Co-morbidities  Goal: Patient's chronic conditions and co-morbidity symptoms are monitored and maintained or improved  Outcome: Progressing     Problem: Discharge Planning  Goal: Discharge to home or other facility with appropriate resources  Outcome: Progressing     Problem: Skin/Tissue Integrity  Goal: Absence of new skin breakdown  Description: 1. Monitor for areas of redness and/or skin breakdown  2. Assess vascular access sites hourly  3. Every 4-6 hours minimum:  Change oxygen saturation probe site  4. Every 4-6 hours:  If on nasal continuous positive airway pressure, respiratory therapy assess nares and determine need for appliance change or resting period. 5/30/2022 1000 by Cody Hardin RN  Outcome: Progressing     Problem: Safety - Adult  Goal: Free from fall injury  5/30/2022 1000 by Cody Hardin RN  Outcome: Progressing  Flowsheets (Taken 5/30/2022 0958)  Free From Fall Injury: Instruct family/caregiver on patient safety     Problem: ABCDS Injury Assessment  Goal: Absence of physical injury  5/30/2022 1000 by Cody Hardin RN  Outcome: Progressing     Problem: Safety - Medical Restraint  Goal: Remains free of injury from restraints (Restraint for Interference with Medical Device)  Description: INTERVENTIONS:  1. Determine that other, less restrictive measures have been tried or would not be effective before applying the restraint  2. Evaluate the patient's condition at the time of restraint application  3. Inform patient/family regarding the reason for restraint  4.  Q2H: Monitor safety, psychosocial status, comfort, nutrition and hydration  Outcome: Progressing

## 2022-05-30 NOTE — PROGRESS NOTES
Single lumen picc Placement 5/30/2022    Product number: [id number]   Lot Number: [id number]      Ultrasound: yes   Right Basilic vein:                Upper Arm Circumference: 38cm    Size: 50cm    Exposed Length: 5cm    Internal Length: 45cm   Cut: 5cm   Vein Measurement: 0.60cm    Shannan Moreira RN  5/30/2022  11:22 AM    [place]

## 2022-05-31 VITALS
SYSTOLIC BLOOD PRESSURE: 125 MMHG | BODY MASS INDEX: 37.02 KG/M2 | DIASTOLIC BLOOD PRESSURE: 89 MMHG | HEART RATE: 86 BPM | TEMPERATURE: 97.4 F | HEIGHT: 74 IN | RESPIRATION RATE: 16 BRPM | OXYGEN SATURATION: 97 % | WEIGHT: 288.5 LBS

## 2022-05-31 DIAGNOSIS — K65.1 PERITONEAL ABSCESS (HCC): ICD-10-CM

## 2022-05-31 LAB
ANION GAP SERPL CALCULATED.3IONS-SCNC: 12 MMOL/L (ref 7–16)
ANISOCYTOSIS: ABNORMAL
BASOPHILS ABSOLUTE: 0.06 E9/L (ref 0–0.2)
BASOPHILS RELATIVE PERCENT: 0.7 % (ref 0–2)
BLOOD CULTURE, ROUTINE: NORMAL
BUN BLDV-MCNC: 6 MG/DL (ref 6–20)
BURR CELLS: ABNORMAL
CALCIUM SERPL-MCNC: 8.5 MG/DL (ref 8.6–10.2)
CHLORIDE BLD-SCNC: 103 MMOL/L (ref 98–107)
CO2: 25 MMOL/L (ref 22–29)
CREAT SERPL-MCNC: 0.6 MG/DL (ref 0.7–1.2)
CULTURE, BLOOD 2: NORMAL
EOSINOPHILS ABSOLUTE: 0 E9/L (ref 0.05–0.5)
EOSINOPHILS RELATIVE PERCENT: 0 % (ref 0–6)
GFR AFRICAN AMERICAN: >60
GFR NON-AFRICAN AMERICAN: >60 ML/MIN/1.73
GLUCOSE BLD-MCNC: 156 MG/DL (ref 74–99)
HCT VFR BLD CALC: 40.7 % (ref 37–54)
HEMOGLOBIN: 13.6 G/DL (ref 12.5–16.5)
IMMATURE GRANULOCYTES #: 0.16 E9/L
IMMATURE GRANULOCYTES %: 2 % (ref 0–5)
LYMPHOCYTES ABSOLUTE: 2.63 E9/L (ref 1.5–4)
LYMPHOCYTES RELATIVE PERCENT: 32.7 % (ref 20–42)
MAGNESIUM: 2.1 MG/DL (ref 1.6–2.6)
MCH RBC QN AUTO: 28.9 PG (ref 26–35)
MCHC RBC AUTO-ENTMCNC: 33.4 % (ref 32–34.5)
MCV RBC AUTO: 86.6 FL (ref 80–99.9)
METER GLUCOSE: 135 MG/DL (ref 74–99)
METER GLUCOSE: 195 MG/DL (ref 74–99)
METER GLUCOSE: <40 MG/DL (ref 74–99)
METER GLUCOSE: <40 MG/DL (ref 74–99)
MONOCYTES ABSOLUTE: 0.75 E9/L (ref 0.1–0.95)
MONOCYTES RELATIVE PERCENT: 9.3 % (ref 2–12)
NEUTROPHILS ABSOLUTE: 4.45 E9/L (ref 1.8–7.3)
NEUTROPHILS RELATIVE PERCENT: 55.3 % (ref 43–80)
PDW BLD-RTO: 14.7 FL (ref 11.5–15)
PLATELET # BLD: 339 E9/L (ref 130–450)
PMV BLD AUTO: 9 FL (ref 7–12)
POIKILOCYTES: ABNORMAL
POLYCHROMASIA: ABNORMAL
POTASSIUM SERPL-SCNC: 3.6 MMOL/L (ref 3.5–5)
RBC # BLD: 4.7 E12/L (ref 3.8–5.8)
ROULEAUX: ABNORMAL
SMUDGE CELLS: ABNORMAL
SODIUM BLD-SCNC: 140 MMOL/L (ref 132–146)
TEAR DROP CELLS: ABNORMAL
WBC # BLD: 8.1 E9/L (ref 4.5–11.5)

## 2022-05-31 PROCEDURE — 6370000000 HC RX 637 (ALT 250 FOR IP): Performed by: STUDENT IN AN ORGANIZED HEALTH CARE EDUCATION/TRAINING PROGRAM

## 2022-05-31 PROCEDURE — 6370000000 HC RX 637 (ALT 250 FOR IP): Performed by: INTERNAL MEDICINE

## 2022-05-31 PROCEDURE — 85025 COMPLETE CBC W/AUTO DIFF WBC: CPT

## 2022-05-31 PROCEDURE — 2580000003 HC RX 258: Performed by: NURSE PRACTITIONER

## 2022-05-31 PROCEDURE — 83735 ASSAY OF MAGNESIUM: CPT

## 2022-05-31 PROCEDURE — 80048 BASIC METABOLIC PNL TOTAL CA: CPT

## 2022-05-31 PROCEDURE — 36415 COLL VENOUS BLD VENIPUNCTURE: CPT

## 2022-05-31 PROCEDURE — 6360000002 HC RX W HCPCS: Performed by: NURSE PRACTITIONER

## 2022-05-31 PROCEDURE — 6360000002 HC RX W HCPCS: Performed by: STUDENT IN AN ORGANIZED HEALTH CARE EDUCATION/TRAINING PROGRAM

## 2022-05-31 PROCEDURE — 82962 GLUCOSE BLOOD TEST: CPT

## 2022-05-31 PROCEDURE — 6370000000 HC RX 637 (ALT 250 FOR IP): Performed by: NURSE PRACTITIONER

## 2022-05-31 PROCEDURE — S5553 INSULIN LONG ACTING 5 U: HCPCS | Performed by: INTERNAL MEDICINE

## 2022-05-31 RX ORDER — INSULIN GLARGINE 100 [IU]/ML
35 INJECTION, SOLUTION SUBCUTANEOUS NIGHTLY
Qty: 20 PEN | Refills: 0
Start: 2022-05-31 | End: 2022-09-20

## 2022-05-31 RX ORDER — HEPARIN SODIUM (PORCINE) LOCK FLUSH IV SOLN 100 UNIT/ML 100 UNIT/ML
500 SOLUTION INTRAVENOUS PRN
Status: CANCELLED | OUTPATIENT
Start: 2022-05-31

## 2022-05-31 RX ORDER — SODIUM CHLORIDE 0.9 % (FLUSH) 0.9 %
5-40 SYRINGE (ML) INJECTION PRN
Status: CANCELLED | OUTPATIENT
Start: 2022-05-31

## 2022-05-31 RX ADMIN — INSULIN LISPRO 3 UNITS: 100 INJECTION, SOLUTION INTRAVENOUS; SUBCUTANEOUS at 08:54

## 2022-05-31 RX ADMIN — Medication 500 MG: at 08:44

## 2022-05-31 RX ADMIN — INSULIN GLARGINE-YFGN 35 UNITS: 100 INJECTION, SOLUTION SUBCUTANEOUS at 08:55

## 2022-05-31 RX ADMIN — Medication 500 MG: at 12:35

## 2022-05-31 RX ADMIN — Medication 10 ML: at 08:55

## 2022-05-31 RX ADMIN — ATORVASTATIN CALCIUM 40 MG: 40 TABLET, FILM COATED ORAL at 08:45

## 2022-05-31 RX ADMIN — INSULIN LISPRO 12 UNITS: 100 INJECTION, SOLUTION INTRAVENOUS; SUBCUTANEOUS at 08:55

## 2022-05-31 RX ADMIN — LISINOPRIL 5 MG: 5 TABLET ORAL at 08:45

## 2022-05-31 RX ADMIN — LINEZOLID 600 MG: 600 TABLET, FILM COATED ORAL at 08:45

## 2022-05-31 RX ADMIN — ENOXAPARIN SODIUM 30 MG: 100 INJECTION SUBCUTANEOUS at 08:44

## 2022-05-31 RX ADMIN — ERTAPENEM SODIUM 1000 MG: 1 INJECTION, POWDER, LYOPHILIZED, FOR SOLUTION INTRAMUSCULAR; INTRAVENOUS at 08:45

## 2022-05-31 ASSESSMENT — PAIN SCALES - GENERAL: PAINLEVEL_OUTOF10: 0

## 2022-05-31 NOTE — PLAN OF CARE
Problem: Chronic Conditions and Co-morbidities  Goal: Patient's chronic conditions and co-morbidity symptoms are monitored and maintained or improved  Outcome: Progressing  Flowsheets (Taken 5/31/2022 0041)  Care Plan - Patient's Chronic Conditions and Co-Morbidity Symptoms are Monitored and Maintained or Improved: Monitor and assess patient's chronic conditions and comorbid symptoms for stability, deterioration, or improvement     Problem: Discharge Planning  Goal: Discharge to home or other facility with appropriate resources  Outcome: Progressing  Flowsheets (Taken 5/31/2022 0041)  Discharge to home or other facility with appropriate resources: Identify barriers to discharge with patient and caregiver     Problem: Skin/Tissue Integrity  Goal: Absence of new skin breakdown  Outcome: Progressing     Problem: Safety - Adult  Goal: Free from fall injury  Outcome: Progressing     Problem: ABCDS Injury Assessment  Goal: Absence of physical injury  Outcome: Progressing  Flowsheets  Taken 5/31/2022 0126  Absence of Physical Injury: Implement safety measures based on patient assessment  Taken 5/31/2022 0124  Absence of Physical Injury: Implement safety measures based on patient assessment     Problem: Pain  Goal: Verbalizes/displays adequate comfort level or baseline comfort level  Outcome: Progressing     Problem: Safety - Medical Restraint  Goal: Remains free of injury from restraints (Restraint for Interference with Medical Device)  Outcome: Progressing

## 2022-05-31 NOTE — PROGRESS NOTES
[facility] PROGRESS NOTE      Chief complaint: Follow-up of intraabdominal abscess    The patient is a 64 y.o. 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 Queta Cortez and given a course of piperacillin-tazobactam until 10 days after drain removal on 03/23 then cefdinir, presented on 05/25 with fatigue accompanied by abdominal pain and diarrhea for 4 days, found to be in DKA and sepsis with leukocytosis of 20,000, CT abdomen and pelvis showing fluid reaccumulation in the right lower quadrant extraperitoneal collection. CT chest showed clear lungs. He received a dose of vancomycin on admission. Piperacillin-tazobactam was started on admission. He underwent percutaneous drain placement on 05/27. Subjective: Patient was seen and examined. No chills, no abdominal pain, no diarrhea, no rash, no itching. No nausea. Appetite good. No new complaints. Continues to feel good. Objective:    Vitals:    05/31/22 0730   BP: 125/89   Pulse: 86   Resp: 16   Temp: 97.4 °F (36.3 °C)   SpO2: 97%     Constitutional: Alert, not in distress  Respiratory: Clear breath sounds, no crackles, no wheezes  Cardiovascular: Regular rate and rhythm, no murmurs  Gastrointestinal: Bowel sounds present, soft, nontender. RLQ percutaneous drain in place  Skin: Warm and dry, no active dermatoses  Musculoskeletal: No joint swelling, no joint erythema    Labs, imaging, and medical records/notes were personally reviewed. Abscess culture GNR and GPO   Blood cx NGTD    Assessment:  Sepsis, resolving, secondary to intraabdominal abscess  DKA    Recommendations:  stop piperacillin-tazobactam 3.375g q8h.  Switch to Invanz  and zyvox for discharge. Place picc  Will need min 2 weeks, need office follow up before antibiotic stopped  Drain check per IR  Follow up abscess fluid cultures-enterococcus faecium(amp resistant) and klebsiella aerogenes, evaluating for anaerobes  Continue supportive care. outpt IV invanz and po zyvox    followup with ID and labs     Thank you for involving me in the care of [employer]. Please do not hesitate to call for any questions or concerns.     Electronically signed by Marissa Sousa MD on 5/31/2022 at 12:02 PM

## 2022-05-31 NOTE — CARE COORDINATION
Discharge order noted. Per ID note yesterday, Switch to Invanz and zyvox for discharge. Place PICC. Will need min 2 weeks, need office follow up before antibiotic stopped. Drain check per IR. Single lumen PICC line placed 5/30. Per [place] [facility], they are not able to accept patient due to staffing. I met with patient in room to discuss transition of care. He said since the [place] is once a day, he would like to go to the [facility] and would like to go to [facility] [address] [place] [address] since it is closer to his home. Voicemail message left with [address] [place] [address] phone# [phone] and fax# [phone] to make referral and referral information and script faxed to them as well. Await appointment time for tomorrow. Script for po Zyvox taken to our outpatient pharmacy. They will let me know if its covered or if a prior auth is needed. Pt stated he has had a drain in the past & he feels he can that at home. His family will transport him home today. Lilian Beltrán RN CM  [phone]        Per Mc from our outpatient pharmacy, the Zyvox does require a prior auth. I called and spoke to Yoselyn at [facility] phone# [phone] and received approval. Mc at our outpatient pharmacy notified. Spoke with Poonam Ivy at [address] [place] [address]. His first appointment will be tomorrow June 1st at 10:30 am and she will notify the patient.   Lilian Beltrán RN CM  [phone]

## 2022-05-31 NOTE — PROGRESS NOTES
CLINICAL PHARMACY NOTE: MEDS TO BEDS    Total # of Prescriptions Filled: 1   The following medications were delivered to the patient:  · linezolid 600 mg    Additional Documentation:  Delivered meds to patient @12:45

## 2022-06-01 ENCOUNTER — HOSPITAL ENCOUNTER (OUTPATIENT)
Dept: INFUSION THERAPY | Age: 57
Setting detail: INFUSION SERIES
Discharge: HOME | End: 2022-06-01
Payer: COMMERCIAL

## 2022-06-01 VITALS
TEMPERATURE: 97.7 F | OXYGEN SATURATION: 97 % | RESPIRATION RATE: 18 BRPM | HEART RATE: 88 BPM | DIASTOLIC BLOOD PRESSURE: 62 MMHG | SYSTOLIC BLOOD PRESSURE: 109 MMHG

## 2022-06-01 DIAGNOSIS — K65.1 INTRA-ABDOMINAL ABSCESS (HCC): Primary | ICD-10-CM

## 2022-06-01 PROCEDURE — 2580000003 HC RX 258: Performed by: NURSE PRACTITIONER

## 2022-06-01 PROCEDURE — 6360000002 HC RX W HCPCS: Performed by: NURSE PRACTITIONER

## 2022-06-01 PROCEDURE — 96365 THER/PROPH/DIAG IV INF INIT: CPT

## 2022-06-01 RX ORDER — HEPARIN SODIUM (PORCINE) LOCK FLUSH IV SOLN 100 UNIT/ML 100 UNIT/ML
500 SOLUTION INTRAVENOUS PRN
Status: DISCONTINUED | OUTPATIENT
Start: 2022-06-01 | End: 2022-06-02 | Stop reason: HOSPADM

## 2022-06-01 RX ORDER — HEPARIN SODIUM (PORCINE) LOCK FLUSH IV SOLN 100 UNIT/ML 100 UNIT/ML
500 SOLUTION INTRAVENOUS PRN
Status: CANCELLED | OUTPATIENT
Start: 2022-06-02

## 2022-06-01 RX ORDER — SODIUM CHLORIDE 0.9 % (FLUSH) 0.9 %
5-40 SYRINGE (ML) INJECTION PRN
Status: DISCONTINUED | OUTPATIENT
Start: 2022-06-01 | End: 2022-06-02 | Stop reason: HOSPADM

## 2022-06-01 RX ORDER — SODIUM CHLORIDE 0.9 % (FLUSH) 0.9 %
5-40 SYRINGE (ML) INJECTION PRN
Status: CANCELLED | OUTPATIENT
Start: 2022-06-02

## 2022-06-01 RX ADMIN — SODIUM CHLORIDE, PRESERVATIVE FREE 10 ML: 5 INJECTION INTRAVENOUS at 10:55

## 2022-06-01 RX ADMIN — SODIUM CHLORIDE, PRESERVATIVE FREE 10 ML: 5 INJECTION INTRAVENOUS at 10:33

## 2022-06-01 RX ADMIN — ERTAPENEM SODIUM 1000 MG: 1 INJECTION, POWDER, LYOPHILIZED, FOR SOLUTION INTRAMUSCULAR; INTRAVENOUS at 10:34

## 2022-06-01 RX ADMIN — HEPARIN 500 UNITS: 100 SYRINGE at 10:55

## 2022-06-02 ENCOUNTER — HOSPITAL ENCOUNTER (OUTPATIENT)
Dept: INFUSION THERAPY | Age: 57
Setting detail: INFUSION SERIES
Discharge: HOME | End: 2022-06-02
Payer: COMMERCIAL

## 2022-06-02 VITALS
RESPIRATION RATE: 24 BRPM | TEMPERATURE: 98 F | DIASTOLIC BLOOD PRESSURE: 80 MMHG | OXYGEN SATURATION: 97 % | HEART RATE: 80 BPM | SYSTOLIC BLOOD PRESSURE: 118 MMHG

## 2022-06-02 DIAGNOSIS — K65.1 INTRA-ABDOMINAL ABSCESS (HCC): Primary | ICD-10-CM

## 2022-06-02 LAB
ALBUMIN SERPL-MCNC: 3.2 G/DL (ref 3.5–5.2)
ALP BLD-CCNC: 94 U/L (ref 40–129)
ALT SERPL-CCNC: 23 U/L (ref 0–40)
ANION GAP SERPL CALCULATED.3IONS-SCNC: 10 MMOL/L (ref 7–16)
AST SERPL-CCNC: 24 U/L (ref 0–39)
BASOPHILS ABSOLUTE: 0.05 E9/L (ref 0–0.2)
BASOPHILS RELATIVE PERCENT: 0.6 % (ref 0–2)
BILIRUB SERPL-MCNC: 0.3 MG/DL (ref 0–1.2)
BUN BLDV-MCNC: 9 MG/DL (ref 6–20)
CALCIUM SERPL-MCNC: 9.1 MG/DL (ref 8.6–10.2)
CHLORIDE BLD-SCNC: 99 MMOL/L (ref 98–107)
CO2: 25 MMOL/L (ref 22–29)
CREAT SERPL-MCNC: 0.6 MG/DL (ref 0.7–1.2)
EOSINOPHILS ABSOLUTE: 0 E9/L (ref 0.05–0.5)
EOSINOPHILS RELATIVE PERCENT: 0 % (ref 0–6)
GFR AFRICAN AMERICAN: >60
GFR NON-AFRICAN AMERICAN: >60 ML/MIN/1.73
GLUCOSE BLD-MCNC: 253 MG/DL (ref 74–99)
HCT VFR BLD CALC: 44.7 % (ref 37–54)
HEMOGLOBIN: 14.4 G/DL (ref 12.5–16.5)
IMMATURE GRANULOCYTES #: 0.11 E9/L
IMMATURE GRANULOCYTES %: 1.4 % (ref 0–5)
LYMPHOCYTES ABSOLUTE: 2.9 E9/L (ref 1.5–4)
LYMPHOCYTES RELATIVE PERCENT: 36.3 % (ref 20–42)
MCH RBC QN AUTO: 28.9 PG (ref 26–35)
MCHC RBC AUTO-ENTMCNC: 32.2 % (ref 32–34.5)
MCV RBC AUTO: 89.6 FL (ref 80–99.9)
MONOCYTES ABSOLUTE: 0.74 E9/L (ref 0.1–0.95)
MONOCYTES RELATIVE PERCENT: 9.3 % (ref 2–12)
NEUTROPHILS ABSOLUTE: 4.18 E9/L (ref 1.8–7.3)
NEUTROPHILS RELATIVE PERCENT: 52.4 % (ref 43–80)
PDW BLD-RTO: 14.6 FL (ref 11.5–15)
PLATELET # BLD: 328 E9/L (ref 130–450)
PMV BLD AUTO: 9.1 FL (ref 7–12)
POTASSIUM SERPL-SCNC: 4.2 MMOL/L (ref 3.5–5)
RBC # BLD: 4.99 E12/L (ref 3.8–5.8)
SEDIMENTATION RATE, ERYTHROCYTE: 36 MM/HR (ref 0–15)
SODIUM BLD-SCNC: 134 MMOL/L (ref 132–146)
TOTAL PROTEIN: 7.5 G/DL (ref 6.4–8.3)
WBC # BLD: 8 E9/L (ref 4.5–11.5)

## 2022-06-02 PROCEDURE — 36592 COLLECT BLOOD FROM PICC: CPT

## 2022-06-02 PROCEDURE — 80053 COMPREHEN METABOLIC PANEL: CPT

## 2022-06-02 PROCEDURE — 96365 THER/PROPH/DIAG IV INF INIT: CPT

## 2022-06-02 PROCEDURE — 6360000002 HC RX W HCPCS: Performed by: NURSE PRACTITIONER

## 2022-06-02 PROCEDURE — 85025 COMPLETE CBC W/AUTO DIFF WBC: CPT

## 2022-06-02 PROCEDURE — 85651 RBC SED RATE NONAUTOMATED: CPT

## 2022-06-02 PROCEDURE — 2580000003 HC RX 258: Performed by: NURSE PRACTITIONER

## 2022-06-02 PROCEDURE — 86140 C-REACTIVE PROTEIN: CPT

## 2022-06-02 PROCEDURE — 36415 COLL VENOUS BLD VENIPUNCTURE: CPT

## 2022-06-02 RX ORDER — SODIUM CHLORIDE 0.9 % (FLUSH) 0.9 %
5-40 SYRINGE (ML) INJECTION PRN
Status: DISCONTINUED | OUTPATIENT
Start: 2022-06-02 | End: 2022-06-03 | Stop reason: HOSPADM

## 2022-06-02 RX ORDER — HEPARIN SODIUM (PORCINE) LOCK FLUSH IV SOLN 100 UNIT/ML 100 UNIT/ML
500 SOLUTION INTRAVENOUS PRN
Status: CANCELLED | OUTPATIENT
Start: 2022-06-03

## 2022-06-02 RX ORDER — SODIUM CHLORIDE 0.9 % (FLUSH) 0.9 %
5-40 SYRINGE (ML) INJECTION PRN
Status: CANCELLED | OUTPATIENT
Start: 2022-06-03

## 2022-06-02 RX ORDER — HEPARIN SODIUM (PORCINE) LOCK FLUSH IV SOLN 100 UNIT/ML 100 UNIT/ML
500 SOLUTION INTRAVENOUS PRN
Status: DISCONTINUED | OUTPATIENT
Start: 2022-06-02 | End: 2022-06-03 | Stop reason: HOSPADM

## 2022-06-02 RX ADMIN — SODIUM CHLORIDE, PRESERVATIVE FREE 10 ML: 5 INJECTION INTRAVENOUS at 10:59

## 2022-06-02 RX ADMIN — ERTAPENEM SODIUM 1000 MG: 1 INJECTION, POWDER, LYOPHILIZED, FOR SOLUTION INTRAMUSCULAR; INTRAVENOUS at 10:25

## 2022-06-02 RX ADMIN — SODIUM CHLORIDE, PRESERVATIVE FREE 10 ML: 5 INJECTION INTRAVENOUS at 10:20

## 2022-06-02 RX ADMIN — HEPARIN 500 UNITS: 100 SYRINGE at 10:59

## 2022-06-02 RX ADMIN — SODIUM CHLORIDE, PRESERVATIVE FREE 10 ML: 5 INJECTION INTRAVENOUS at 10:21

## 2022-06-03 ENCOUNTER — HOSPITAL ENCOUNTER (OUTPATIENT)
Dept: INFUSION THERAPY | Age: 57
Setting detail: INFUSION SERIES
Discharge: HOME | End: 2022-06-03
Payer: COMMERCIAL

## 2022-06-03 VITALS
SYSTOLIC BLOOD PRESSURE: 116 MMHG | HEART RATE: 90 BPM | TEMPERATURE: 98 F | RESPIRATION RATE: 24 BRPM | DIASTOLIC BLOOD PRESSURE: 70 MMHG | OXYGEN SATURATION: 99 %

## 2022-06-03 DIAGNOSIS — K65.1 INTRA-ABDOMINAL ABSCESS (HCC): Primary | ICD-10-CM

## 2022-06-03 LAB — C-REACTIVE PROTEIN: 1 MG/DL (ref 0–0.4)

## 2022-06-03 PROCEDURE — 2580000003 HC RX 258: Performed by: NURSE PRACTITIONER

## 2022-06-03 PROCEDURE — 96365 THER/PROPH/DIAG IV INF INIT: CPT

## 2022-06-03 PROCEDURE — 6360000002 HC RX W HCPCS: Performed by: NURSE PRACTITIONER

## 2022-06-03 RX ORDER — SODIUM CHLORIDE 0.9 % (FLUSH) 0.9 %
5-40 SYRINGE (ML) INJECTION PRN
Status: DISCONTINUED | OUTPATIENT
Start: 2022-06-03 | End: 2022-06-04 | Stop reason: HOSPADM

## 2022-06-03 RX ORDER — HEPARIN SODIUM (PORCINE) LOCK FLUSH IV SOLN 100 UNIT/ML 100 UNIT/ML
500 SOLUTION INTRAVENOUS PRN
Status: DISCONTINUED | OUTPATIENT
Start: 2022-06-03 | End: 2022-06-04 | Stop reason: HOSPADM

## 2022-06-03 RX ORDER — HEPARIN SODIUM (PORCINE) LOCK FLUSH IV SOLN 100 UNIT/ML 100 UNIT/ML
500 SOLUTION INTRAVENOUS PRN
Status: CANCELLED | OUTPATIENT
Start: 2022-06-04

## 2022-06-03 RX ORDER — SODIUM CHLORIDE 0.9 % (FLUSH) 0.9 %
5-40 SYRINGE (ML) INJECTION PRN
Status: CANCELLED | OUTPATIENT
Start: 2022-06-04

## 2022-06-03 RX ADMIN — Medication 10 ML: at 10:24

## 2022-06-03 RX ADMIN — Medication 10 ML: at 10:50

## 2022-06-03 RX ADMIN — ERTAPENEM SODIUM 1000 MG: 1 INJECTION, POWDER, LYOPHILIZED, FOR SOLUTION INTRAMUSCULAR; INTRAVENOUS at 10:26

## 2022-06-03 RX ADMIN — HEPARIN SODIUM (PORCINE) LOCK FLUSH IV SOLN 100 UNIT/ML 500 UNITS: 100 SOLUTION at 10:50

## 2022-06-04 ENCOUNTER — HOSPITAL ENCOUNTER (OUTPATIENT)
Dept: INFUSION THERAPY | Age: 57
Setting detail: INFUSION SERIES
Discharge: HOME | End: 2022-06-04
Payer: COMMERCIAL

## 2022-06-04 DIAGNOSIS — K65.1 INTRA-ABDOMINAL ABSCESS (HCC): Primary | ICD-10-CM

## 2022-06-04 PROCEDURE — 6360000002 HC RX W HCPCS: Performed by: NURSE PRACTITIONER

## 2022-06-04 PROCEDURE — 99211 OFF/OP EST MAY X REQ PHY/QHP: CPT

## 2022-06-04 PROCEDURE — 96365 THER/PROPH/DIAG IV INF INIT: CPT

## 2022-06-04 PROCEDURE — 2580000003 HC RX 258: Performed by: NURSE PRACTITIONER

## 2022-06-04 RX ORDER — SODIUM CHLORIDE 0.9 % (FLUSH) 0.9 %
5-40 SYRINGE (ML) INJECTION PRN
Status: DISCONTINUED | OUTPATIENT
Start: 2022-06-04 | End: 2022-06-05 | Stop reason: HOSPADM

## 2022-06-04 RX ORDER — HEPARIN SODIUM (PORCINE) LOCK FLUSH IV SOLN 100 UNIT/ML 100 UNIT/ML
500 SOLUTION INTRAVENOUS PRN
Status: CANCELLED | OUTPATIENT
Start: 2022-06-05

## 2022-06-04 RX ORDER — HEPARIN SODIUM (PORCINE) LOCK FLUSH IV SOLN 100 UNIT/ML 100 UNIT/ML
500 SOLUTION INTRAVENOUS PRN
Status: DISCONTINUED | OUTPATIENT
Start: 2022-06-04 | End: 2022-06-05 | Stop reason: HOSPADM

## 2022-06-04 RX ORDER — SODIUM CHLORIDE 0.9 % (FLUSH) 0.9 %
5-40 SYRINGE (ML) INJECTION PRN
Status: CANCELLED | OUTPATIENT
Start: 2022-06-05

## 2022-06-04 RX ADMIN — Medication 10 ML: at 09:24

## 2022-06-04 RX ADMIN — HEPARIN SODIUM (PORCINE) LOCK FLUSH IV SOLN 100 UNIT/ML 500 UNITS: 100 SOLUTION at 09:24

## 2022-06-04 RX ADMIN — Medication 10 ML: at 09:00

## 2022-06-04 RX ADMIN — ERTAPENEM SODIUM 1000 MG: 1 INJECTION, POWDER, LYOPHILIZED, FOR SOLUTION INTRAMUSCULAR; INTRAVENOUS at 09:00

## 2022-06-05 ENCOUNTER — HOSPITAL ENCOUNTER (OUTPATIENT)
Dept: INFUSION THERAPY | Age: 57
Setting detail: INFUSION SERIES
Discharge: HOME | End: 2022-06-05
Payer: COMMERCIAL

## 2022-06-05 DIAGNOSIS — K65.1 INTRA-ABDOMINAL ABSCESS (HCC): Primary | ICD-10-CM

## 2022-06-05 PROCEDURE — 96365 THER/PROPH/DIAG IV INF INIT: CPT

## 2022-06-05 PROCEDURE — 6360000002 HC RX W HCPCS: Performed by: NURSE PRACTITIONER

## 2022-06-05 PROCEDURE — 2580000003 HC RX 258: Performed by: NURSE PRACTITIONER

## 2022-06-05 RX ORDER — HEPARIN SODIUM (PORCINE) LOCK FLUSH IV SOLN 100 UNIT/ML 100 UNIT/ML
500 SOLUTION INTRAVENOUS PRN
Status: CANCELLED | OUTPATIENT
Start: 2022-06-06

## 2022-06-05 RX ORDER — HEPARIN SODIUM (PORCINE) LOCK FLUSH IV SOLN 100 UNIT/ML 100 UNIT/ML
500 SOLUTION INTRAVENOUS PRN
Status: DISCONTINUED | OUTPATIENT
Start: 2022-06-05 | End: 2022-06-06 | Stop reason: HOSPADM

## 2022-06-05 RX ORDER — SODIUM CHLORIDE 0.9 % (FLUSH) 0.9 %
5-40 SYRINGE (ML) INJECTION PRN
Status: CANCELLED | OUTPATIENT
Start: 2022-06-06

## 2022-06-05 RX ORDER — SODIUM CHLORIDE 0.9 % (FLUSH) 0.9 %
5-40 SYRINGE (ML) INJECTION PRN
Status: DISCONTINUED | OUTPATIENT
Start: 2022-06-05 | End: 2022-06-06 | Stop reason: HOSPADM

## 2022-06-05 RX ADMIN — ERTAPENEM SODIUM 1000 MG: 1 INJECTION, POWDER, LYOPHILIZED, FOR SOLUTION INTRAMUSCULAR; INTRAVENOUS at 08:47

## 2022-06-05 RX ADMIN — Medication 10 ML: at 08:47

## 2022-06-05 RX ADMIN — Medication 10 ML: at 09:11

## 2022-06-05 RX ADMIN — HEPARIN SODIUM (PORCINE) LOCK FLUSH IV SOLN 100 UNIT/ML 500 UNITS: 100 SOLUTION at 09:11

## 2022-06-06 ENCOUNTER — HOSPITAL ENCOUNTER (OUTPATIENT)
Dept: INFUSION THERAPY | Age: 57
Setting detail: INFUSION SERIES
Discharge: HOME | End: 2022-06-06
Payer: COMMERCIAL

## 2022-06-06 VITALS
OXYGEN SATURATION: 95 % | TEMPERATURE: 98 F | HEART RATE: 95 BPM | RESPIRATION RATE: 24 BRPM | SYSTOLIC BLOOD PRESSURE: 108 MMHG | DIASTOLIC BLOOD PRESSURE: 64 MMHG

## 2022-06-06 DIAGNOSIS — K65.1 INTRA-ABDOMINAL ABSCESS (HCC): Primary | ICD-10-CM

## 2022-06-06 LAB
ALBUMIN SERPL-MCNC: 3.2 G/DL (ref 3.5–5.2)
ALP BLD-CCNC: 110 U/L (ref 40–129)
ALT SERPL-CCNC: 23 U/L (ref 0–40)
ANION GAP SERPL CALCULATED.3IONS-SCNC: 9 MMOL/L (ref 7–16)
AST SERPL-CCNC: 20 U/L (ref 0–39)
BASOPHILS ABSOLUTE: 0.05 E9/L (ref 0–0.2)
BASOPHILS RELATIVE PERCENT: 0.6 % (ref 0–2)
BILIRUB SERPL-MCNC: 0.3 MG/DL (ref 0–1.2)
BUN BLDV-MCNC: 9 MG/DL (ref 6–20)
C-REACTIVE PROTEIN: 0.5 MG/DL (ref 0–0.4)
CALCIUM SERPL-MCNC: 8.7 MG/DL (ref 8.6–10.2)
CHLORIDE BLD-SCNC: 100 MMOL/L (ref 98–107)
CO2: 25 MMOL/L (ref 22–29)
CREAT SERPL-MCNC: 0.6 MG/DL (ref 0.7–1.2)
EOSINOPHILS ABSOLUTE: 0 E9/L (ref 0.05–0.5)
EOSINOPHILS RELATIVE PERCENT: 0 % (ref 0–6)
GFR AFRICAN AMERICAN: >60
GFR NON-AFRICAN AMERICAN: >60 ML/MIN/1.73
GLUCOSE BLD-MCNC: 305 MG/DL (ref 74–99)
HCT VFR BLD CALC: 43.7 % (ref 37–54)
HEMOGLOBIN: 14.2 G/DL (ref 12.5–16.5)
IMMATURE GRANULOCYTES #: 0.02 E9/L
IMMATURE GRANULOCYTES %: 0.2 % (ref 0–5)
LYMPHOCYTES ABSOLUTE: 1.9 E9/L (ref 1.5–4)
LYMPHOCYTES RELATIVE PERCENT: 23.3 % (ref 20–42)
MCH RBC QN AUTO: 28.7 PG (ref 26–35)
MCHC RBC AUTO-ENTMCNC: 32.5 % (ref 32–34.5)
MCV RBC AUTO: 88.5 FL (ref 80–99.9)
MONOCYTES ABSOLUTE: 0.62 E9/L (ref 0.1–0.95)
MONOCYTES RELATIVE PERCENT: 7.6 % (ref 2–12)
NEUTROPHILS ABSOLUTE: 5.57 E9/L (ref 1.8–7.3)
NEUTROPHILS RELATIVE PERCENT: 68.3 % (ref 43–80)
PDW BLD-RTO: 14.2 FL (ref 11.5–15)
PLATELET # BLD: 317 E9/L (ref 130–450)
PMV BLD AUTO: 9.2 FL (ref 7–12)
POTASSIUM SERPL-SCNC: 4.3 MMOL/L (ref 3.5–5)
RBC # BLD: 4.94 E12/L (ref 3.8–5.8)
SEDIMENTATION RATE, ERYTHROCYTE: 38 MM/HR (ref 0–15)
SODIUM BLD-SCNC: 134 MMOL/L (ref 132–146)
TOTAL PROTEIN: 7.2 G/DL (ref 6.4–8.3)
WBC # BLD: 8.2 E9/L (ref 4.5–11.5)

## 2022-06-06 PROCEDURE — 86140 C-REACTIVE PROTEIN: CPT

## 2022-06-06 PROCEDURE — 36592 COLLECT BLOOD FROM PICC: CPT

## 2022-06-06 PROCEDURE — 80053 COMPREHEN METABOLIC PANEL: CPT

## 2022-06-06 PROCEDURE — 85651 RBC SED RATE NONAUTOMATED: CPT

## 2022-06-06 PROCEDURE — 96365 THER/PROPH/DIAG IV INF INIT: CPT

## 2022-06-06 PROCEDURE — 36415 COLL VENOUS BLD VENIPUNCTURE: CPT

## 2022-06-06 PROCEDURE — 6360000002 HC RX W HCPCS: Performed by: NURSE PRACTITIONER

## 2022-06-06 PROCEDURE — 2580000003 HC RX 258: Performed by: NURSE PRACTITIONER

## 2022-06-06 PROCEDURE — 85025 COMPLETE CBC W/AUTO DIFF WBC: CPT

## 2022-06-06 RX ORDER — SODIUM CHLORIDE 0.9 % (FLUSH) 0.9 %
5-40 SYRINGE (ML) INJECTION PRN
Status: CANCELLED | OUTPATIENT
Start: 2022-06-07

## 2022-06-06 RX ORDER — HEPARIN SODIUM (PORCINE) LOCK FLUSH IV SOLN 100 UNIT/ML 100 UNIT/ML
500 SOLUTION INTRAVENOUS PRN
Status: DISCONTINUED | OUTPATIENT
Start: 2022-06-06 | End: 2022-06-07 | Stop reason: HOSPADM

## 2022-06-06 RX ORDER — SODIUM CHLORIDE 0.9 % (FLUSH) 0.9 %
5-40 SYRINGE (ML) INJECTION PRN
Status: DISCONTINUED | OUTPATIENT
Start: 2022-06-06 | End: 2022-06-07 | Stop reason: HOSPADM

## 2022-06-06 RX ORDER — HEPARIN SODIUM (PORCINE) LOCK FLUSH IV SOLN 100 UNIT/ML 100 UNIT/ML
500 SOLUTION INTRAVENOUS PRN
Status: CANCELLED | OUTPATIENT
Start: 2022-06-07

## 2022-06-06 RX ADMIN — Medication 10 ML: at 10:41

## 2022-06-06 RX ADMIN — Medication 10 ML: at 11:05

## 2022-06-06 RX ADMIN — ERTAPENEM SODIUM 1000 MG: 1 INJECTION, POWDER, LYOPHILIZED, FOR SOLUTION INTRAMUSCULAR; INTRAVENOUS at 10:43

## 2022-06-06 RX ADMIN — HEPARIN SODIUM (PORCINE) LOCK FLUSH IV SOLN 100 UNIT/ML 500 UNITS: 100 SOLUTION at 11:05

## 2022-06-07 ENCOUNTER — HOSPITAL ENCOUNTER (OUTPATIENT)
Dept: INFUSION THERAPY | Age: 57
Setting detail: INFUSION SERIES
Discharge: HOME | End: 2022-06-07
Payer: COMMERCIAL

## 2022-06-07 VITALS
DIASTOLIC BLOOD PRESSURE: 67 MMHG | TEMPERATURE: 98 F | SYSTOLIC BLOOD PRESSURE: 104 MMHG | RESPIRATION RATE: 24 BRPM | OXYGEN SATURATION: 98 % | HEART RATE: 87 BPM

## 2022-06-07 DIAGNOSIS — K65.1 INTRA-ABDOMINAL ABSCESS (HCC): Primary | ICD-10-CM

## 2022-06-07 PROCEDURE — 96365 THER/PROPH/DIAG IV INF INIT: CPT

## 2022-06-07 PROCEDURE — 99211 OFF/OP EST MAY X REQ PHY/QHP: CPT

## 2022-06-07 PROCEDURE — 6360000002 HC RX W HCPCS: Performed by: NURSE PRACTITIONER

## 2022-06-07 PROCEDURE — 2580000003 HC RX 258: Performed by: NURSE PRACTITIONER

## 2022-06-07 RX ORDER — SODIUM CHLORIDE 0.9 % (FLUSH) 0.9 %
5-40 SYRINGE (ML) INJECTION PRN
Status: DISCONTINUED | OUTPATIENT
Start: 2022-06-07 | End: 2022-06-08 | Stop reason: HOSPADM

## 2022-06-07 RX ORDER — HEPARIN SODIUM (PORCINE) LOCK FLUSH IV SOLN 100 UNIT/ML 100 UNIT/ML
500 SOLUTION INTRAVENOUS PRN
Status: CANCELLED | OUTPATIENT
Start: 2022-06-08

## 2022-06-07 RX ORDER — HEPARIN SODIUM (PORCINE) LOCK FLUSH IV SOLN 100 UNIT/ML 100 UNIT/ML
500 SOLUTION INTRAVENOUS PRN
Status: DISCONTINUED | OUTPATIENT
Start: 2022-06-07 | End: 2022-06-08 | Stop reason: HOSPADM

## 2022-06-07 RX ORDER — SODIUM CHLORIDE 0.9 % (FLUSH) 0.9 %
5-40 SYRINGE (ML) INJECTION PRN
Status: CANCELLED | OUTPATIENT
Start: 2022-06-08

## 2022-06-07 RX ADMIN — HEPARIN SODIUM (PORCINE) LOCK FLUSH IV SOLN 100 UNIT/ML 500 UNITS: 100 SOLUTION at 11:03

## 2022-06-07 RX ADMIN — Medication 10 ML: at 11:03

## 2022-06-07 RX ADMIN — ERTAPENEM SODIUM 1000 MG: 1 INJECTION, POWDER, LYOPHILIZED, FOR SOLUTION INTRAMUSCULAR; INTRAVENOUS at 10:33

## 2022-06-08 ENCOUNTER — HOSPITAL ENCOUNTER (OUTPATIENT)
Dept: INFUSION THERAPY | Age: 57
Setting detail: INFUSION SERIES
Discharge: HOME | End: 2022-06-08
Payer: COMMERCIAL

## 2022-06-08 VITALS
HEART RATE: 91 BPM | SYSTOLIC BLOOD PRESSURE: 106 MMHG | TEMPERATURE: 97 F | RESPIRATION RATE: 20 BRPM | OXYGEN SATURATION: 98 % | DIASTOLIC BLOOD PRESSURE: 60 MMHG

## 2022-06-08 DIAGNOSIS — K65.1 INTRA-ABDOMINAL ABSCESS (HCC): Primary | ICD-10-CM

## 2022-06-08 PROCEDURE — 2580000003 HC RX 258: Performed by: NURSE PRACTITIONER

## 2022-06-08 PROCEDURE — 6360000002 HC RX W HCPCS: Performed by: NURSE PRACTITIONER

## 2022-06-08 PROCEDURE — 96365 THER/PROPH/DIAG IV INF INIT: CPT

## 2022-06-08 RX ORDER — SODIUM CHLORIDE 0.9 % (FLUSH) 0.9 %
5-40 SYRINGE (ML) INJECTION PRN
Status: DISCONTINUED | OUTPATIENT
Start: 2022-06-08 | End: 2022-06-09 | Stop reason: HOSPADM

## 2022-06-08 RX ORDER — HEPARIN SODIUM (PORCINE) LOCK FLUSH IV SOLN 100 UNIT/ML 100 UNIT/ML
500 SOLUTION INTRAVENOUS PRN
Status: CANCELLED | OUTPATIENT
Start: 2022-06-09

## 2022-06-08 RX ORDER — HEPARIN SODIUM (PORCINE) LOCK FLUSH IV SOLN 100 UNIT/ML 100 UNIT/ML
500 SOLUTION INTRAVENOUS PRN
Status: DISCONTINUED | OUTPATIENT
Start: 2022-06-08 | End: 2022-06-09 | Stop reason: HOSPADM

## 2022-06-08 RX ORDER — SODIUM CHLORIDE 0.9 % (FLUSH) 0.9 %
5-40 SYRINGE (ML) INJECTION PRN
Status: CANCELLED | OUTPATIENT
Start: 2022-06-09

## 2022-06-08 RX ADMIN — ERTAPENEM SODIUM 1000 MG: 1 INJECTION, POWDER, LYOPHILIZED, FOR SOLUTION INTRAMUSCULAR; INTRAVENOUS at 10:00

## 2022-06-08 RX ADMIN — HEPARIN SODIUM (PORCINE) LOCK FLUSH IV SOLN 100 UNIT/ML 500 UNITS: 100 SOLUTION at 10:30

## 2022-06-08 RX ADMIN — Medication 10 ML: at 10:00

## 2022-06-08 RX ADMIN — Medication 10 ML: at 10:30

## 2022-06-09 ENCOUNTER — HOSPITAL ENCOUNTER (OUTPATIENT)
Dept: INFUSION THERAPY | Age: 57
Setting detail: INFUSION SERIES
Discharge: HOME | End: 2022-06-09
Payer: COMMERCIAL

## 2022-06-09 VITALS
TEMPERATURE: 97.6 F | OXYGEN SATURATION: 99 % | DIASTOLIC BLOOD PRESSURE: 61 MMHG | SYSTOLIC BLOOD PRESSURE: 104 MMHG | HEART RATE: 90 BPM | RESPIRATION RATE: 20 BRPM

## 2022-06-09 DIAGNOSIS — K65.1 INTRA-ABDOMINAL ABSCESS (HCC): Primary | ICD-10-CM

## 2022-06-09 LAB
ALBUMIN SERPL-MCNC: 3.3 G/DL (ref 3.5–5.2)
ALP BLD-CCNC: 117 U/L (ref 40–129)
ALT SERPL-CCNC: 19 U/L (ref 0–40)
ANION GAP SERPL CALCULATED.3IONS-SCNC: 9 MMOL/L (ref 7–16)
AST SERPL-CCNC: 17 U/L (ref 0–39)
BASOPHILS ABSOLUTE: 0.08 E9/L (ref 0–0.2)
BASOPHILS RELATIVE PERCENT: 1.2 % (ref 0–2)
BILIRUB SERPL-MCNC: 0.2 MG/DL (ref 0–1.2)
BUN BLDV-MCNC: 8 MG/DL (ref 6–20)
CALCIUM SERPL-MCNC: 8.8 MG/DL (ref 8.6–10.2)
CHLORIDE BLD-SCNC: 100 MMOL/L (ref 98–107)
CO2: 25 MMOL/L (ref 22–29)
CREAT SERPL-MCNC: 0.6 MG/DL (ref 0.7–1.2)
EOSINOPHILS ABSOLUTE: 0 E9/L (ref 0.05–0.5)
EOSINOPHILS RELATIVE PERCENT: 0 % (ref 0–6)
GFR AFRICAN AMERICAN: >60
GFR NON-AFRICAN AMERICAN: >60 ML/MIN/1.73
GLUCOSE BLD-MCNC: 276 MG/DL (ref 74–99)
HCT VFR BLD CALC: 41.1 % (ref 37–54)
HEMOGLOBIN: 13.5 G/DL (ref 12.5–16.5)
IMMATURE GRANULOCYTES #: 0.02 E9/L
IMMATURE GRANULOCYTES %: 0.3 % (ref 0–5)
LYMPHOCYTES ABSOLUTE: 1.68 E9/L (ref 1.5–4)
LYMPHOCYTES RELATIVE PERCENT: 24.6 % (ref 20–42)
MCH RBC QN AUTO: 29 PG (ref 26–35)
MCHC RBC AUTO-ENTMCNC: 32.8 % (ref 32–34.5)
MCV RBC AUTO: 88.2 FL (ref 80–99.9)
MONOCYTES ABSOLUTE: 0.59 E9/L (ref 0.1–0.95)
MONOCYTES RELATIVE PERCENT: 8.6 % (ref 2–12)
NEUTROPHILS ABSOLUTE: 4.47 E9/L (ref 1.8–7.3)
NEUTROPHILS RELATIVE PERCENT: 65.3 % (ref 43–80)
PDW BLD-RTO: 14.4 FL (ref 11.5–15)
PLATELET # BLD: 299 E9/L (ref 130–450)
PMV BLD AUTO: 9.2 FL (ref 7–12)
POTASSIUM SERPL-SCNC: 4.2 MMOL/L (ref 3.5–5)
RBC # BLD: 4.66 E12/L (ref 3.8–5.8)
SODIUM BLD-SCNC: 134 MMOL/L (ref 132–146)
TOTAL PROTEIN: 7.1 G/DL (ref 6.4–8.3)
WBC # BLD: 6.8 E9/L (ref 4.5–11.5)

## 2022-06-09 PROCEDURE — 96365 THER/PROPH/DIAG IV INF INIT: CPT

## 2022-06-09 PROCEDURE — 36592 COLLECT BLOOD FROM PICC: CPT

## 2022-06-09 PROCEDURE — 80053 COMPREHEN METABOLIC PANEL: CPT

## 2022-06-09 PROCEDURE — 2580000003 HC RX 258: Performed by: NURSE PRACTITIONER

## 2022-06-09 PROCEDURE — 85025 COMPLETE CBC W/AUTO DIFF WBC: CPT

## 2022-06-09 PROCEDURE — 6360000002 HC RX W HCPCS: Performed by: NURSE PRACTITIONER

## 2022-06-09 PROCEDURE — 36415 COLL VENOUS BLD VENIPUNCTURE: CPT

## 2022-06-09 RX ORDER — HEPARIN SODIUM (PORCINE) LOCK FLUSH IV SOLN 100 UNIT/ML 100 UNIT/ML
500 SOLUTION INTRAVENOUS PRN
Status: CANCELLED | OUTPATIENT
Start: 2022-06-10

## 2022-06-09 RX ORDER — SODIUM CHLORIDE 0.9 % (FLUSH) 0.9 %
5-40 SYRINGE (ML) INJECTION PRN
Status: DISCONTINUED | OUTPATIENT
Start: 2022-06-09 | End: 2022-06-10 | Stop reason: HOSPADM

## 2022-06-09 RX ORDER — HEPARIN SODIUM (PORCINE) LOCK FLUSH IV SOLN 100 UNIT/ML 100 UNIT/ML
500 SOLUTION INTRAVENOUS PRN
Status: DISCONTINUED | OUTPATIENT
Start: 2022-06-09 | End: 2022-06-10 | Stop reason: HOSPADM

## 2022-06-09 RX ORDER — SODIUM CHLORIDE 0.9 % (FLUSH) 0.9 %
5-40 SYRINGE (ML) INJECTION PRN
Status: CANCELLED | OUTPATIENT
Start: 2022-06-10

## 2022-06-09 RX ADMIN — HEPARIN SODIUM (PORCINE) LOCK FLUSH IV SOLN 100 UNIT/ML 500 UNITS: 100 SOLUTION at 11:21

## 2022-06-09 RX ADMIN — Medication 10 ML: at 11:20

## 2022-06-09 RX ADMIN — ERTAPENEM SODIUM 1000 MG: 1 INJECTION, POWDER, LYOPHILIZED, FOR SOLUTION INTRAMUSCULAR; INTRAVENOUS at 10:57

## 2022-06-09 RX ADMIN — Medication 10 ML: at 10:54

## 2022-06-10 ENCOUNTER — HOSPITAL ENCOUNTER (OUTPATIENT)
Dept: INFUSION THERAPY | Age: 57
Setting detail: INFUSION SERIES
Discharge: HOME | End: 2022-06-10
Payer: COMMERCIAL

## 2022-06-10 VITALS
HEART RATE: 75 BPM | SYSTOLIC BLOOD PRESSURE: 102 MMHG | TEMPERATURE: 98 F | RESPIRATION RATE: 24 BRPM | DIASTOLIC BLOOD PRESSURE: 64 MMHG

## 2022-06-10 DIAGNOSIS — K65.1 INTRA-ABDOMINAL ABSCESS (HCC): Primary | ICD-10-CM

## 2022-06-10 PROCEDURE — 96365 THER/PROPH/DIAG IV INF INIT: CPT

## 2022-06-10 PROCEDURE — 2580000003 HC RX 258: Performed by: NURSE PRACTITIONER

## 2022-06-10 PROCEDURE — 6360000002 HC RX W HCPCS: Performed by: NURSE PRACTITIONER

## 2022-06-10 RX ORDER — HEPARIN SODIUM (PORCINE) LOCK FLUSH IV SOLN 100 UNIT/ML 100 UNIT/ML
500 SOLUTION INTRAVENOUS PRN
Status: DISCONTINUED | OUTPATIENT
Start: 2022-06-10 | End: 2022-06-11 | Stop reason: HOSPADM

## 2022-06-10 RX ORDER — SODIUM CHLORIDE 0.9 % (FLUSH) 0.9 %
5-40 SYRINGE (ML) INJECTION PRN
Status: DISCONTINUED | OUTPATIENT
Start: 2022-06-10 | End: 2022-06-11 | Stop reason: HOSPADM

## 2022-06-10 RX ORDER — SODIUM CHLORIDE 0.9 % (FLUSH) 0.9 %
5-40 SYRINGE (ML) INJECTION PRN
Status: CANCELLED | OUTPATIENT
Start: 2022-06-11

## 2022-06-10 RX ORDER — HEPARIN SODIUM (PORCINE) LOCK FLUSH IV SOLN 100 UNIT/ML 100 UNIT/ML
500 SOLUTION INTRAVENOUS PRN
Status: CANCELLED | OUTPATIENT
Start: 2022-06-11

## 2022-06-10 RX ADMIN — ERTAPENEM SODIUM 1000 MG: 1 INJECTION, POWDER, LYOPHILIZED, FOR SOLUTION INTRAMUSCULAR; INTRAVENOUS at 10:07

## 2022-06-10 RX ADMIN — Medication 10 ML: at 10:07

## 2022-06-10 RX ADMIN — Medication 10 ML: at 10:34

## 2022-06-10 RX ADMIN — HEPARIN SODIUM (PORCINE) LOCK FLUSH IV SOLN 100 UNIT/ML 500 UNITS: 100 SOLUTION at 10:34

## 2022-06-11 ENCOUNTER — HOSPITAL ENCOUNTER (OUTPATIENT)
Dept: INFUSION THERAPY | Age: 57
Setting detail: INFUSION SERIES
Discharge: HOME | End: 2022-06-11
Payer: COMMERCIAL

## 2022-06-11 VITALS
RESPIRATION RATE: 20 BRPM | OXYGEN SATURATION: 99 % | SYSTOLIC BLOOD PRESSURE: 117 MMHG | HEART RATE: 88 BPM | TEMPERATURE: 97.9 F | DIASTOLIC BLOOD PRESSURE: 67 MMHG

## 2022-06-11 DIAGNOSIS — K65.1 INTRA-ABDOMINAL ABSCESS (HCC): Primary | ICD-10-CM

## 2022-06-11 PROCEDURE — 6360000002 HC RX W HCPCS: Performed by: NURSE PRACTITIONER

## 2022-06-11 PROCEDURE — 96365 THER/PROPH/DIAG IV INF INIT: CPT

## 2022-06-11 PROCEDURE — 2580000003 HC RX 258: Performed by: NURSE PRACTITIONER

## 2022-06-11 RX ORDER — HEPARIN SODIUM (PORCINE) LOCK FLUSH IV SOLN 100 UNIT/ML 100 UNIT/ML
500 SOLUTION INTRAVENOUS PRN
Status: CANCELLED | OUTPATIENT
Start: 2022-06-12

## 2022-06-11 RX ORDER — HEPARIN SODIUM (PORCINE) LOCK FLUSH IV SOLN 100 UNIT/ML 100 UNIT/ML
500 SOLUTION INTRAVENOUS PRN
Status: DISCONTINUED | OUTPATIENT
Start: 2022-06-11 | End: 2022-06-12 | Stop reason: HOSPADM

## 2022-06-11 RX ORDER — SODIUM CHLORIDE 0.9 % (FLUSH) 0.9 %
5-40 SYRINGE (ML) INJECTION PRN
Status: DISCONTINUED | OUTPATIENT
Start: 2022-06-11 | End: 2022-06-12 | Stop reason: HOSPADM

## 2022-06-11 RX ORDER — SODIUM CHLORIDE 0.9 % (FLUSH) 0.9 %
5-40 SYRINGE (ML) INJECTION PRN
Status: CANCELLED | OUTPATIENT
Start: 2022-06-12

## 2022-06-11 RX ADMIN — Medication 10 ML: at 08:31

## 2022-06-11 RX ADMIN — Medication 10 ML: at 08:54

## 2022-06-11 RX ADMIN — HEPARIN SODIUM (PORCINE) LOCK FLUSH IV SOLN 100 UNIT/ML 500 UNITS: 100 SOLUTION at 08:54

## 2022-06-11 RX ADMIN — ERTAPENEM SODIUM 1000 MG: 1 INJECTION, POWDER, LYOPHILIZED, FOR SOLUTION INTRAMUSCULAR; INTRAVENOUS at 08:34

## 2022-06-12 ENCOUNTER — HOSPITAL ENCOUNTER (OUTPATIENT)
Dept: INFUSION THERAPY | Age: 57
Setting detail: INFUSION SERIES
Discharge: HOME | End: 2022-06-12
Payer: COMMERCIAL

## 2022-06-12 VITALS
TEMPERATURE: 97.5 F | RESPIRATION RATE: 20 BRPM | SYSTOLIC BLOOD PRESSURE: 106 MMHG | OXYGEN SATURATION: 98 % | HEART RATE: 88 BPM | DIASTOLIC BLOOD PRESSURE: 67 MMHG

## 2022-06-12 DIAGNOSIS — K65.1 INTRA-ABDOMINAL ABSCESS (HCC): Primary | ICD-10-CM

## 2022-06-12 PROCEDURE — 96365 THER/PROPH/DIAG IV INF INIT: CPT

## 2022-06-12 PROCEDURE — 2580000003 HC RX 258: Performed by: NURSE PRACTITIONER

## 2022-06-12 PROCEDURE — 6360000002 HC RX W HCPCS: Performed by: NURSE PRACTITIONER

## 2022-06-12 RX ORDER — HEPARIN SODIUM (PORCINE) LOCK FLUSH IV SOLN 100 UNIT/ML 100 UNIT/ML
500 SOLUTION INTRAVENOUS PRN
Status: CANCELLED | OUTPATIENT
Start: 2022-06-13

## 2022-06-12 RX ORDER — SODIUM CHLORIDE 0.9 % (FLUSH) 0.9 %
5-40 SYRINGE (ML) INJECTION PRN
Status: DISCONTINUED | OUTPATIENT
Start: 2022-06-12 | End: 2022-06-13 | Stop reason: HOSPADM

## 2022-06-12 RX ORDER — SODIUM CHLORIDE 0.9 % (FLUSH) 0.9 %
5-40 SYRINGE (ML) INJECTION PRN
Status: CANCELLED | OUTPATIENT
Start: 2022-06-13

## 2022-06-12 RX ORDER — HEPARIN SODIUM (PORCINE) LOCK FLUSH IV SOLN 100 UNIT/ML 100 UNIT/ML
500 SOLUTION INTRAVENOUS PRN
Status: DISCONTINUED | OUTPATIENT
Start: 2022-06-12 | End: 2022-06-13 | Stop reason: HOSPADM

## 2022-06-12 RX ADMIN — Medication 10 ML: at 09:04

## 2022-06-12 RX ADMIN — Medication 10 ML: at 08:40

## 2022-06-12 RX ADMIN — ERTAPENEM SODIUM 1000 MG: 1 INJECTION, POWDER, LYOPHILIZED, FOR SOLUTION INTRAMUSCULAR; INTRAVENOUS at 08:40

## 2022-06-12 RX ADMIN — HEPARIN SODIUM (PORCINE) LOCK FLUSH IV SOLN 100 UNIT/ML 500 UNITS: 100 SOLUTION at 09:04

## 2022-06-13 ENCOUNTER — HOSPITAL ENCOUNTER (OUTPATIENT)
Dept: INFUSION THERAPY | Age: 57
Setting detail: INFUSION SERIES
Discharge: HOME | End: 2022-06-13
Payer: COMMERCIAL

## 2022-06-13 VITALS
HEART RATE: 92 BPM | DIASTOLIC BLOOD PRESSURE: 88 MMHG | OXYGEN SATURATION: 98 % | SYSTOLIC BLOOD PRESSURE: 122 MMHG | RESPIRATION RATE: 24 BRPM | TEMPERATURE: 98 F

## 2022-06-13 DIAGNOSIS — K65.1 INTRA-ABDOMINAL ABSCESS (HCC): Primary | ICD-10-CM

## 2022-06-13 LAB
ALBUMIN SERPL-MCNC: 3.1 G/DL (ref 3.5–5.2)
ALP BLD-CCNC: 124 U/L (ref 40–129)
ALT SERPL-CCNC: 12 U/L (ref 0–40)
ANION GAP SERPL CALCULATED.3IONS-SCNC: 10 MMOL/L (ref 7–16)
AST SERPL-CCNC: 12 U/L (ref 0–39)
BASOPHILS ABSOLUTE: 0.05 E9/L (ref 0–0.2)
BASOPHILS RELATIVE PERCENT: 0.9 % (ref 0–2)
BILIRUB SERPL-MCNC: 0.3 MG/DL (ref 0–1.2)
BUN BLDV-MCNC: 9 MG/DL (ref 6–20)
C-REACTIVE PROTEIN: 0.9 MG/DL (ref 0–0.4)
CALCIUM SERPL-MCNC: 8.8 MG/DL (ref 8.6–10.2)
CHLORIDE BLD-SCNC: 102 MMOL/L (ref 98–107)
CO2: 25 MMOL/L (ref 22–29)
CREAT SERPL-MCNC: 0.6 MG/DL (ref 0.7–1.2)
EOSINOPHILS ABSOLUTE: 0.05 E9/L (ref 0.05–0.5)
EOSINOPHILS RELATIVE PERCENT: 0.9 % (ref 0–6)
GFR AFRICAN AMERICAN: >60
GFR NON-AFRICAN AMERICAN: >60 ML/MIN/1.73
GLUCOSE BLD-MCNC: 193 MG/DL (ref 74–99)
HCT VFR BLD CALC: 40.5 % (ref 37–54)
HEMOGLOBIN: 13.2 G/DL (ref 12.5–16.5)
IMMATURE GRANULOCYTES #: 0.01 E9/L
IMMATURE GRANULOCYTES %: 0.2 % (ref 0–5)
LYMPHOCYTES ABSOLUTE: 2.03 E9/L (ref 1.5–4)
LYMPHOCYTES RELATIVE PERCENT: 34.8 % (ref 20–42)
MCH RBC QN AUTO: 29.4 PG (ref 26–35)
MCHC RBC AUTO-ENTMCNC: 32.6 % (ref 32–34.5)
MCV RBC AUTO: 90.2 FL (ref 80–99.9)
MONOCYTES ABSOLUTE: 0.44 E9/L (ref 0.1–0.95)
MONOCYTES RELATIVE PERCENT: 7.5 % (ref 2–12)
NEUTROPHILS ABSOLUTE: 3.26 E9/L (ref 1.8–7.3)
NEUTROPHILS RELATIVE PERCENT: 55.7 % (ref 43–80)
PDW BLD-RTO: 14.3 FL (ref 11.5–15)
PLATELET # BLD: 266 E9/L (ref 130–450)
PMV BLD AUTO: 8.9 FL (ref 7–12)
POTASSIUM SERPL-SCNC: 3.8 MMOL/L (ref 3.5–5)
RBC # BLD: 4.49 E12/L (ref 3.8–5.8)
SEDIMENTATION RATE, ERYTHROCYTE: 60 MM/HR (ref 0–15)
SODIUM BLD-SCNC: 137 MMOL/L (ref 132–146)
TOTAL PROTEIN: 7 G/DL (ref 6.4–8.3)
WBC # BLD: 5.8 E9/L (ref 4.5–11.5)

## 2022-06-13 PROCEDURE — 6360000002 HC RX W HCPCS: Performed by: NURSE PRACTITIONER

## 2022-06-13 PROCEDURE — 86140 C-REACTIVE PROTEIN: CPT

## 2022-06-13 PROCEDURE — 96365 THER/PROPH/DIAG IV INF INIT: CPT

## 2022-06-13 PROCEDURE — 36415 COLL VENOUS BLD VENIPUNCTURE: CPT

## 2022-06-13 PROCEDURE — 85025 COMPLETE CBC W/AUTO DIFF WBC: CPT

## 2022-06-13 PROCEDURE — 85651 RBC SED RATE NONAUTOMATED: CPT

## 2022-06-13 PROCEDURE — 80053 COMPREHEN METABOLIC PANEL: CPT

## 2022-06-13 PROCEDURE — 36592 COLLECT BLOOD FROM PICC: CPT

## 2022-06-13 PROCEDURE — 2580000003 HC RX 258: Performed by: NURSE PRACTITIONER

## 2022-06-13 RX ORDER — HEPARIN SODIUM (PORCINE) LOCK FLUSH IV SOLN 100 UNIT/ML 100 UNIT/ML
500 SOLUTION INTRAVENOUS PRN
Status: DISCONTINUED | OUTPATIENT
Start: 2022-06-13 | End: 2022-06-14 | Stop reason: HOSPADM

## 2022-06-13 RX ORDER — HEPARIN SODIUM (PORCINE) LOCK FLUSH IV SOLN 100 UNIT/ML 100 UNIT/ML
500 SOLUTION INTRAVENOUS PRN
Status: CANCELLED | OUTPATIENT
Start: 2022-06-14

## 2022-06-13 RX ORDER — SODIUM CHLORIDE 0.9 % (FLUSH) 0.9 %
5-40 SYRINGE (ML) INJECTION PRN
Status: CANCELLED | OUTPATIENT
Start: 2022-06-14

## 2022-06-13 RX ORDER — SODIUM CHLORIDE 0.9 % (FLUSH) 0.9 %
5-40 SYRINGE (ML) INJECTION PRN
Status: DISCONTINUED | OUTPATIENT
Start: 2022-06-13 | End: 2022-06-14 | Stop reason: HOSPADM

## 2022-06-13 RX ADMIN — Medication 10 ML: at 10:53

## 2022-06-13 RX ADMIN — ERTAPENEM SODIUM 1000 MG: 1 INJECTION, POWDER, LYOPHILIZED, FOR SOLUTION INTRAMUSCULAR; INTRAVENOUS at 10:30

## 2022-06-13 RX ADMIN — HEPARIN SODIUM (PORCINE) LOCK FLUSH IV SOLN 100 UNIT/ML 500 UNITS: 100 SOLUTION at 10:53

## 2022-06-14 ENCOUNTER — HOSPITAL ENCOUNTER (OUTPATIENT)
Dept: INFUSION THERAPY | Age: 57
Setting detail: INFUSION SERIES
Discharge: HOME | End: 2022-06-14
Payer: COMMERCIAL

## 2022-06-14 VITALS
DIASTOLIC BLOOD PRESSURE: 62 MMHG | TEMPERATURE: 97.2 F | HEART RATE: 88 BPM | SYSTOLIC BLOOD PRESSURE: 108 MMHG | OXYGEN SATURATION: 98 % | RESPIRATION RATE: 20 BRPM

## 2022-06-14 DIAGNOSIS — K65.1 INTRA-ABDOMINAL ABSCESS (HCC): Primary | ICD-10-CM

## 2022-06-14 PROCEDURE — 6360000002 HC RX W HCPCS: Performed by: NURSE PRACTITIONER

## 2022-06-14 PROCEDURE — 99211 OFF/OP EST MAY X REQ PHY/QHP: CPT

## 2022-06-14 PROCEDURE — 2580000003 HC RX 258: Performed by: NURSE PRACTITIONER

## 2022-06-14 PROCEDURE — 96365 THER/PROPH/DIAG IV INF INIT: CPT

## 2022-06-14 RX ORDER — SODIUM CHLORIDE 0.9 % (FLUSH) 0.9 %
5-40 SYRINGE (ML) INJECTION PRN
Status: DISCONTINUED | OUTPATIENT
Start: 2022-06-14 | End: 2022-06-15 | Stop reason: HOSPADM

## 2022-06-14 RX ORDER — SODIUM CHLORIDE 0.9 % (FLUSH) 0.9 %
5-40 SYRINGE (ML) INJECTION PRN
Status: CANCELLED | OUTPATIENT
Start: 2022-06-14

## 2022-06-14 RX ORDER — HEPARIN SODIUM (PORCINE) LOCK FLUSH IV SOLN 100 UNIT/ML 100 UNIT/ML
500 SOLUTION INTRAVENOUS PRN
Status: DISCONTINUED | OUTPATIENT
Start: 2022-06-14 | End: 2022-06-15 | Stop reason: HOSPADM

## 2022-06-14 RX ORDER — HEPARIN SODIUM (PORCINE) LOCK FLUSH IV SOLN 100 UNIT/ML 100 UNIT/ML
500 SOLUTION INTRAVENOUS PRN
Status: CANCELLED | OUTPATIENT
Start: 2022-06-14

## 2022-06-14 RX ADMIN — Medication 10 ML: at 09:45

## 2022-06-14 RX ADMIN — ERTAPENEM SODIUM 1000 MG: 1 INJECTION, POWDER, LYOPHILIZED, FOR SOLUTION INTRAMUSCULAR; INTRAVENOUS at 09:45

## 2022-06-14 RX ADMIN — Medication 10 ML: at 10:15

## 2022-06-14 RX ADMIN — HEPARIN SODIUM (PORCINE) LOCK FLUSH IV SOLN 100 UNIT/ML 500 UNITS: 100 SOLUTION at 10:15

## 2022-06-15 ENCOUNTER — HOSPITAL ENCOUNTER (OUTPATIENT)
Dept: INFUSION THERAPY | Age: 57
Setting detail: INFUSION SERIES
Discharge: HOME | End: 2022-06-15
Payer: COMMERCIAL

## 2022-06-15 VITALS
RESPIRATION RATE: 24 BRPM | HEART RATE: 90 BPM | TEMPERATURE: 97.9 F | SYSTOLIC BLOOD PRESSURE: 105 MMHG | DIASTOLIC BLOOD PRESSURE: 65 MMHG | OXYGEN SATURATION: 96 %

## 2022-06-15 DIAGNOSIS — K65.1 INTRA-ABDOMINAL ABSCESS (HCC): Primary | ICD-10-CM

## 2022-06-15 PROCEDURE — 96365 THER/PROPH/DIAG IV INF INIT: CPT

## 2022-06-15 PROCEDURE — 2580000003 HC RX 258: Performed by: SPECIALIST

## 2022-06-15 PROCEDURE — 6360000002 HC RX W HCPCS: Performed by: SPECIALIST

## 2022-06-15 RX ADMIN — ERTAPENEM SODIUM 1000 MG: 1 INJECTION, POWDER, LYOPHILIZED, FOR SOLUTION INTRAMUSCULAR; INTRAVENOUS at 09:49

## 2022-06-16 ENCOUNTER — HOSPITAL ENCOUNTER (OUTPATIENT)
Dept: INFUSION THERAPY | Age: 57
Setting detail: INFUSION SERIES
Discharge: HOME | End: 2022-06-16
Payer: COMMERCIAL

## 2022-06-16 VITALS
DIASTOLIC BLOOD PRESSURE: 61 MMHG | TEMPERATURE: 98 F | RESPIRATION RATE: 24 BRPM | SYSTOLIC BLOOD PRESSURE: 104 MMHG | OXYGEN SATURATION: 97 % | HEART RATE: 96 BPM

## 2022-06-16 DIAGNOSIS — K65.1 INTRA-ABDOMINAL ABSCESS (HCC): Primary | ICD-10-CM

## 2022-06-16 LAB
ALBUMIN SERPL-MCNC: 3.3 G/DL (ref 3.5–5.2)
ALP BLD-CCNC: 144 U/L (ref 40–129)
ALT SERPL-CCNC: 15 U/L (ref 0–40)
ANION GAP SERPL CALCULATED.3IONS-SCNC: 10 MMOL/L (ref 7–16)
AST SERPL-CCNC: 15 U/L (ref 0–39)
BASOPHILS ABSOLUTE: 0.03 E9/L (ref 0–0.2)
BASOPHILS RELATIVE PERCENT: 0.5 % (ref 0–2)
BILIRUB SERPL-MCNC: 0.4 MG/DL (ref 0–1.2)
BUN BLDV-MCNC: 9 MG/DL (ref 6–20)
CALCIUM SERPL-MCNC: 9 MG/DL (ref 8.6–10.2)
CHLORIDE BLD-SCNC: 105 MMOL/L (ref 98–107)
CO2: 26 MMOL/L (ref 22–29)
CREAT SERPL-MCNC: 0.7 MG/DL (ref 0.7–1.2)
EOSINOPHILS ABSOLUTE: 0 E9/L (ref 0.05–0.5)
EOSINOPHILS RELATIVE PERCENT: 0 % (ref 0–6)
GFR AFRICAN AMERICAN: >60
GFR NON-AFRICAN AMERICAN: >60 ML/MIN/1.73
GLUCOSE BLD-MCNC: 117 MG/DL (ref 74–99)
HCT VFR BLD CALC: 41.4 % (ref 37–54)
HEMOGLOBIN: 13.6 G/DL (ref 12.5–16.5)
IMMATURE GRANULOCYTES #: 0.02 E9/L
IMMATURE GRANULOCYTES %: 0.3 % (ref 0–5)
LYMPHOCYTES ABSOLUTE: 2.06 E9/L (ref 1.5–4)
LYMPHOCYTES RELATIVE PERCENT: 31.9 % (ref 20–42)
MCH RBC QN AUTO: 29.6 PG (ref 26–35)
MCHC RBC AUTO-ENTMCNC: 32.9 % (ref 32–34.5)
MCV RBC AUTO: 90.2 FL (ref 80–99.9)
MONOCYTES ABSOLUTE: 0.51 E9/L (ref 0.1–0.95)
MONOCYTES RELATIVE PERCENT: 7.9 % (ref 2–12)
NEUTROPHILS ABSOLUTE: 3.83 E9/L (ref 1.8–7.3)
NEUTROPHILS RELATIVE PERCENT: 59.4 % (ref 43–80)
PDW BLD-RTO: 14.2 FL (ref 11.5–15)
PLATELET # BLD: 267 E9/L (ref 130–450)
PMV BLD AUTO: 8.9 FL (ref 7–12)
POTASSIUM SERPL-SCNC: 3.6 MMOL/L (ref 3.5–5)
RBC # BLD: 4.59 E12/L (ref 3.8–5.8)
SODIUM BLD-SCNC: 141 MMOL/L (ref 132–146)
TOTAL PROTEIN: 7.4 G/DL (ref 6.4–8.3)
WBC # BLD: 6.5 E9/L (ref 4.5–11.5)

## 2022-06-16 PROCEDURE — 6360000002 HC RX W HCPCS: Performed by: SPECIALIST

## 2022-06-16 PROCEDURE — 96365 THER/PROPH/DIAG IV INF INIT: CPT

## 2022-06-16 PROCEDURE — 36415 COLL VENOUS BLD VENIPUNCTURE: CPT

## 2022-06-16 PROCEDURE — 80053 COMPREHEN METABOLIC PANEL: CPT

## 2022-06-16 PROCEDURE — 36592 COLLECT BLOOD FROM PICC: CPT

## 2022-06-16 PROCEDURE — 2580000003 HC RX 258: Performed by: SPECIALIST

## 2022-06-16 PROCEDURE — 85025 COMPLETE CBC W/AUTO DIFF WBC: CPT

## 2022-06-16 RX ORDER — SODIUM CHLORIDE 0.9 % (FLUSH) 0.9 %
10 SYRINGE (ML) INJECTION PRN
Status: DISCONTINUED | OUTPATIENT
Start: 2022-06-16 | End: 2022-06-17 | Stop reason: HOSPADM

## 2022-06-16 RX ORDER — HEPARIN SODIUM (PORCINE) LOCK FLUSH IV SOLN 100 UNIT/ML 100 UNIT/ML
500 SOLUTION INTRAVENOUS PRN
Status: DISCONTINUED | OUTPATIENT
Start: 2022-06-16 | End: 2022-06-17 | Stop reason: HOSPADM

## 2022-06-16 RX ADMIN — ERTAPENEM SODIUM 1000 MG: 1 INJECTION, POWDER, LYOPHILIZED, FOR SOLUTION INTRAMUSCULAR; INTRAVENOUS at 10:20

## 2022-06-16 RX ADMIN — HEPARIN SODIUM (PORCINE) LOCK FLUSH IV SOLN 100 UNIT/ML 500 UNITS: 100 SOLUTION at 10:41

## 2022-06-16 RX ADMIN — Medication 10 ML: at 10:41

## 2022-06-17 ENCOUNTER — HOSPITAL ENCOUNTER (OUTPATIENT)
Dept: INFUSION THERAPY | Age: 57
Setting detail: INFUSION SERIES
Discharge: HOME | End: 2022-06-17
Payer: COMMERCIAL

## 2022-06-17 VITALS
HEART RATE: 85 BPM | DIASTOLIC BLOOD PRESSURE: 68 MMHG | RESPIRATION RATE: 22 BRPM | TEMPERATURE: 97.8 F | OXYGEN SATURATION: 98 % | SYSTOLIC BLOOD PRESSURE: 112 MMHG

## 2022-06-17 DIAGNOSIS — K65.1 INTRA-ABDOMINAL ABSCESS (HCC): Primary | ICD-10-CM

## 2022-06-17 PROCEDURE — 2580000003 HC RX 258: Performed by: SPECIALIST

## 2022-06-17 PROCEDURE — 96365 THER/PROPH/DIAG IV INF INIT: CPT

## 2022-06-17 PROCEDURE — 6360000002 HC RX W HCPCS: Performed by: SPECIALIST

## 2022-06-17 RX ORDER — SODIUM CHLORIDE 0.9 % (FLUSH) 0.9 %
10 SYRINGE (ML) INJECTION PRN
Status: DISCONTINUED | OUTPATIENT
Start: 2022-06-17 | End: 2022-06-18 | Stop reason: HOSPADM

## 2022-06-17 RX ORDER — HEPARIN SODIUM (PORCINE) LOCK FLUSH IV SOLN 100 UNIT/ML 100 UNIT/ML
500 SOLUTION INTRAVENOUS PRN
Status: CANCELLED
Start: 2022-06-18

## 2022-06-17 RX ORDER — SODIUM CHLORIDE 0.9 % (FLUSH) 0.9 %
10 SYRINGE (ML) INJECTION PRN
Status: CANCELLED
Start: 2022-06-18

## 2022-06-17 RX ORDER — HEPARIN SODIUM (PORCINE) LOCK FLUSH IV SOLN 100 UNIT/ML 100 UNIT/ML
500 SOLUTION INTRAVENOUS PRN
Status: DISCONTINUED | OUTPATIENT
Start: 2022-06-17 | End: 2022-06-18 | Stop reason: HOSPADM

## 2022-06-17 RX ADMIN — Medication 10 ML: at 10:15

## 2022-06-17 RX ADMIN — ERTAPENEM SODIUM 1000 MG: 1 INJECTION, POWDER, LYOPHILIZED, FOR SOLUTION INTRAMUSCULAR; INTRAVENOUS at 09:49

## 2022-06-17 RX ADMIN — HEPARIN SODIUM (PORCINE) LOCK FLUSH IV SOLN 100 UNIT/ML 500 UNITS: 100 SOLUTION at 10:15

## 2022-06-17 RX ADMIN — Medication 10 ML: at 09:48

## 2022-06-18 ENCOUNTER — HOSPITAL ENCOUNTER (OUTPATIENT)
Dept: INFUSION THERAPY | Age: 57
Setting detail: INFUSION SERIES
Discharge: HOME | End: 2022-06-18
Payer: COMMERCIAL

## 2022-06-18 VITALS
DIASTOLIC BLOOD PRESSURE: 72 MMHG | OXYGEN SATURATION: 98 % | TEMPERATURE: 98.1 F | SYSTOLIC BLOOD PRESSURE: 118 MMHG | HEART RATE: 90 BPM | RESPIRATION RATE: 20 BRPM

## 2022-06-18 DIAGNOSIS — K65.1 INTRA-ABDOMINAL ABSCESS (HCC): Primary | ICD-10-CM

## 2022-06-18 PROCEDURE — 2580000003 HC RX 258: Performed by: SPECIALIST

## 2022-06-18 PROCEDURE — 6360000002 HC RX W HCPCS: Performed by: SPECIALIST

## 2022-06-18 PROCEDURE — 96365 THER/PROPH/DIAG IV INF INIT: CPT

## 2022-06-18 RX ORDER — SODIUM CHLORIDE 0.9 % (FLUSH) 0.9 %
10 SYRINGE (ML) INJECTION PRN
Status: CANCELLED
Start: 2022-06-19

## 2022-06-18 RX ORDER — SODIUM CHLORIDE 0.9 % (FLUSH) 0.9 %
10 SYRINGE (ML) INJECTION PRN
Status: DISCONTINUED | OUTPATIENT
Start: 2022-06-18 | End: 2022-06-19 | Stop reason: HOSPADM

## 2022-06-18 RX ORDER — HEPARIN SODIUM (PORCINE) LOCK FLUSH IV SOLN 100 UNIT/ML 100 UNIT/ML
500 SOLUTION INTRAVENOUS PRN
Status: CANCELLED
Start: 2022-06-19

## 2022-06-18 RX ORDER — HEPARIN SODIUM (PORCINE) LOCK FLUSH IV SOLN 100 UNIT/ML 100 UNIT/ML
500 SOLUTION INTRAVENOUS PRN
Status: DISCONTINUED | OUTPATIENT
Start: 2022-06-18 | End: 2022-06-19 | Stop reason: HOSPADM

## 2022-06-18 RX ADMIN — Medication 10 ML: at 09:34

## 2022-06-18 RX ADMIN — HEPARIN SODIUM (PORCINE) LOCK FLUSH IV SOLN 100 UNIT/ML 500 UNITS: 100 SOLUTION at 09:34

## 2022-06-18 RX ADMIN — Medication 10 ML: at 09:04

## 2022-06-18 RX ADMIN — ERTAPENEM SODIUM 1000 MG: 1 INJECTION, POWDER, LYOPHILIZED, FOR SOLUTION INTRAMUSCULAR; INTRAVENOUS at 09:04

## 2022-06-19 ENCOUNTER — HOSPITAL ENCOUNTER (OUTPATIENT)
Dept: INFUSION THERAPY | Age: 57
Setting detail: INFUSION SERIES
Discharge: HOME | End: 2022-06-19
Payer: COMMERCIAL

## 2022-06-19 VITALS
HEART RATE: 84 BPM | SYSTOLIC BLOOD PRESSURE: 116 MMHG | OXYGEN SATURATION: 98 % | TEMPERATURE: 98.1 F | RESPIRATION RATE: 20 BRPM | DIASTOLIC BLOOD PRESSURE: 72 MMHG

## 2022-06-19 DIAGNOSIS — K65.1 INTRA-ABDOMINAL ABSCESS (HCC): Primary | ICD-10-CM

## 2022-06-19 PROCEDURE — 6360000002 HC RX W HCPCS: Performed by: SPECIALIST

## 2022-06-19 PROCEDURE — 96365 THER/PROPH/DIAG IV INF INIT: CPT

## 2022-06-19 PROCEDURE — 2580000003 HC RX 258: Performed by: SPECIALIST

## 2022-06-19 RX ORDER — HEPARIN SODIUM (PORCINE) LOCK FLUSH IV SOLN 100 UNIT/ML 100 UNIT/ML
500 SOLUTION INTRAVENOUS PRN
Status: DISCONTINUED | OUTPATIENT
Start: 2022-06-19 | End: 2022-06-20 | Stop reason: HOSPADM

## 2022-06-19 RX ORDER — HEPARIN SODIUM (PORCINE) LOCK FLUSH IV SOLN 100 UNIT/ML 100 UNIT/ML
500 SOLUTION INTRAVENOUS PRN
Status: CANCELLED
Start: 2022-06-20

## 2022-06-19 RX ORDER — SODIUM CHLORIDE 0.9 % (FLUSH) 0.9 %
10 SYRINGE (ML) INJECTION PRN
Status: DISCONTINUED | OUTPATIENT
Start: 2022-06-19 | End: 2022-06-20 | Stop reason: HOSPADM

## 2022-06-19 RX ORDER — SODIUM CHLORIDE 0.9 % (FLUSH) 0.9 %
10 SYRINGE (ML) INJECTION PRN
Status: CANCELLED
Start: 2022-06-20

## 2022-06-19 RX ADMIN — HEPARIN SODIUM (PORCINE) LOCK FLUSH IV SOLN 100 UNIT/ML 500 UNITS: 100 SOLUTION at 09:01

## 2022-06-19 RX ADMIN — Medication 10 ML: at 09:01

## 2022-06-19 RX ADMIN — Medication 10 ML: at 08:31

## 2022-06-19 RX ADMIN — ERTAPENEM SODIUM 1000 MG: 1 INJECTION, POWDER, LYOPHILIZED, FOR SOLUTION INTRAMUSCULAR; INTRAVENOUS at 08:31

## 2022-06-20 ENCOUNTER — HOSPITAL ENCOUNTER (OUTPATIENT)
Dept: CT IMAGING | Age: 57
Discharge: HOME | End: 2022-06-20
Payer: COMMERCIAL

## 2022-06-20 ENCOUNTER — HOSPITAL ENCOUNTER (OUTPATIENT)
Dept: INFUSION THERAPY | Age: 57
Setting detail: INFUSION SERIES
Discharge: HOME | End: 2022-06-20
Payer: COMMERCIAL

## 2022-06-20 VITALS
HEART RATE: 83 BPM | DIASTOLIC BLOOD PRESSURE: 65 MMHG | SYSTOLIC BLOOD PRESSURE: 116 MMHG | TEMPERATURE: 98 F | RESPIRATION RATE: 24 BRPM | OXYGEN SATURATION: 97 %

## 2022-06-20 DIAGNOSIS — K65.1 INTRA-ABDOMINAL ABSCESS (HCC): ICD-10-CM

## 2022-06-20 DIAGNOSIS — K65.1 INTRA-ABDOMINAL ABSCESS (HCC): Primary | ICD-10-CM

## 2022-06-20 LAB
ALBUMIN SERPL-MCNC: 3.4 G/DL (ref 3.5–5.2)
ALP BLD-CCNC: 144 U/L (ref 40–129)
ALT SERPL-CCNC: 16 U/L (ref 0–40)
ANION GAP SERPL CALCULATED.3IONS-SCNC: 10 MMOL/L (ref 7–16)
AST SERPL-CCNC: 16 U/L (ref 0–39)
BASOPHILS ABSOLUTE: 0.03 E9/L (ref 0–0.2)
BASOPHILS RELATIVE PERCENT: 0.5 % (ref 0–2)
BILIRUB SERPL-MCNC: 0.3 MG/DL (ref 0–1.2)
BUN BLDV-MCNC: 9 MG/DL (ref 6–20)
C-REACTIVE PROTEIN: 0.8 MG/DL (ref 0–0.4)
CALCIUM SERPL-MCNC: 8.7 MG/DL (ref 8.6–10.2)
CHLORIDE BLD-SCNC: 100 MMOL/L (ref 98–107)
CO2: 24 MMOL/L (ref 22–29)
CREAT SERPL-MCNC: 0.6 MG/DL (ref 0.7–1.2)
EOSINOPHILS ABSOLUTE: 0.11 E9/L (ref 0.05–0.5)
EOSINOPHILS RELATIVE PERCENT: 1.8 % (ref 0–6)
GFR AFRICAN AMERICAN: >60
GFR NON-AFRICAN AMERICAN: >60 ML/MIN/1.73
GLUCOSE BLD-MCNC: 257 MG/DL (ref 74–99)
HCT VFR BLD CALC: 41.7 % (ref 37–54)
HEMOGLOBIN: 13.5 G/DL (ref 12.5–16.5)
IMMATURE GRANULOCYTES #: 0.02 E9/L
IMMATURE GRANULOCYTES %: 0.3 % (ref 0–5)
LYMPHOCYTES ABSOLUTE: 2.02 E9/L (ref 1.5–4)
LYMPHOCYTES RELATIVE PERCENT: 32.4 % (ref 20–42)
MCH RBC QN AUTO: 28.8 PG (ref 26–35)
MCHC RBC AUTO-ENTMCNC: 32.4 % (ref 32–34.5)
MCV RBC AUTO: 89.1 FL (ref 80–99.9)
MONOCYTES ABSOLUTE: 0.51 E9/L (ref 0.1–0.95)
MONOCYTES RELATIVE PERCENT: 8.2 % (ref 2–12)
NEUTROPHILS ABSOLUTE: 3.54 E9/L (ref 1.8–7.3)
NEUTROPHILS RELATIVE PERCENT: 56.8 % (ref 43–80)
PDW BLD-RTO: 14.3 FL (ref 11.5–15)
PLATELET # BLD: 279 E9/L (ref 130–450)
PMV BLD AUTO: 8.8 FL (ref 7–12)
POTASSIUM SERPL-SCNC: 4.3 MMOL/L (ref 3.5–5)
RBC # BLD: 4.68 E12/L (ref 3.8–5.8)
SEDIMENTATION RATE, ERYTHROCYTE: 46 MM/HR (ref 0–15)
SODIUM BLD-SCNC: 134 MMOL/L (ref 132–146)
TOTAL PROTEIN: 7.1 G/DL (ref 6.4–8.3)
WBC # BLD: 6.2 E9/L (ref 4.5–11.5)

## 2022-06-20 PROCEDURE — 96365 THER/PROPH/DIAG IV INF INIT: CPT

## 2022-06-20 PROCEDURE — 36592 COLLECT BLOOD FROM PICC: CPT

## 2022-06-20 PROCEDURE — 85651 RBC SED RATE NONAUTOMATED: CPT

## 2022-06-20 PROCEDURE — 2580000003 HC RX 258: Performed by: SPECIALIST

## 2022-06-20 PROCEDURE — 6360000002 HC RX W HCPCS: Performed by: SPECIALIST

## 2022-06-20 PROCEDURE — 85025 COMPLETE CBC W/AUTO DIFF WBC: CPT

## 2022-06-20 PROCEDURE — 36415 COLL VENOUS BLD VENIPUNCTURE: CPT

## 2022-06-20 PROCEDURE — 80053 COMPREHEN METABOLIC PANEL: CPT

## 2022-06-20 PROCEDURE — 74177 CT ABD & PELVIS W/CONTRAST: CPT

## 2022-06-20 PROCEDURE — 6360000004 HC RX CONTRAST MEDICATION: Performed by: RADIOLOGY

## 2022-06-20 PROCEDURE — 86140 C-REACTIVE PROTEIN: CPT

## 2022-06-20 RX ORDER — HEPARIN SODIUM (PORCINE) LOCK FLUSH IV SOLN 100 UNIT/ML 100 UNIT/ML
500 SOLUTION INTRAVENOUS PRN
Status: CANCELLED
Start: 2022-06-20

## 2022-06-20 RX ORDER — SODIUM CHLORIDE 9 MG/ML
5-250 INJECTION, SOLUTION INTRAVENOUS PRN
Status: CANCELLED | OUTPATIENT
Start: 2022-06-20

## 2022-06-20 RX ORDER — HEPARIN SODIUM (PORCINE) LOCK FLUSH IV SOLN 100 UNIT/ML 100 UNIT/ML
500 SOLUTION INTRAVENOUS PRN
Status: DISCONTINUED | OUTPATIENT
Start: 2022-06-20 | End: 2022-06-21 | Stop reason: HOSPADM

## 2022-06-20 RX ORDER — SODIUM CHLORIDE 0.9 % (FLUSH) 0.9 %
5-40 SYRINGE (ML) INJECTION PRN
Status: CANCELLED | OUTPATIENT
Start: 2022-06-20

## 2022-06-20 RX ORDER — HEPARIN SODIUM (PORCINE) LOCK FLUSH IV SOLN 100 UNIT/ML 100 UNIT/ML
500 SOLUTION INTRAVENOUS PRN
Status: CANCELLED | OUTPATIENT
Start: 2022-06-20

## 2022-06-20 RX ORDER — SODIUM CHLORIDE 0.9 % (FLUSH) 0.9 %
10 SYRINGE (ML) INJECTION PRN
Status: DISCONTINUED | OUTPATIENT
Start: 2022-06-20 | End: 2022-06-21 | Stop reason: HOSPADM

## 2022-06-20 RX ORDER — SODIUM CHLORIDE 0.9 % (FLUSH) 0.9 %
10 SYRINGE (ML) INJECTION PRN
Status: CANCELLED
Start: 2022-06-20

## 2022-06-20 RX ADMIN — IOHEXOL 50 ML: 240 INJECTION, SOLUTION INTRATHECAL; INTRAVASCULAR; INTRAVENOUS; ORAL at 09:16

## 2022-06-20 RX ADMIN — IOPAMIDOL 50 ML: 755 INJECTION, SOLUTION INTRAVENOUS at 09:16

## 2022-06-20 RX ADMIN — Medication 10 ML: at 09:32

## 2022-06-20 RX ADMIN — Medication 10 ML: at 09:54

## 2022-06-20 RX ADMIN — ERTAPENEM SODIUM 1000 MG: 1 INJECTION, POWDER, LYOPHILIZED, FOR SOLUTION INTRAMUSCULAR; INTRAVENOUS at 09:32

## 2022-06-20 RX ADMIN — HEPARIN SODIUM (PORCINE) LOCK FLUSH IV SOLN 100 UNIT/ML 500 UNITS: 100 SOLUTION at 09:53

## 2022-06-20 NOTE — DISCHARGE SUMMARY
Hospital Medicine Discharge Summary    Patient ID: Ruba Everett      Patient's PCP: Jack Simental MD    Admit Date: 5/25/2022     Discharge Date: 5/31/2022      Admitting Physician: Demarco Serrato MD     Discharge Physician: Memo Loyd MD     Discharge Diagnoses: Active Hospital Problems    Diagnosis Date Noted    DKA, type 2, not at goal [facility]) [E11.10] 05/25/2022     Priority: Medium    Intra-abdominal abscess ([address] [K65.1] 03/05/2022       The patient was seen and examined on day of discharge and this discharge summary is in conjunction with any daily progress note from day of discharge. Hospital Course:   Patient is a 22-year-old male who presented for evaluation of fatigue and nausea.  Patient has a past medical history significant for diabetes with associated comorbidities and a previous intra-abdominal abscess secondary to perforated appendicitis with a recent s/p IR drainage.  Patient states he was feeling fatigued and developed nausea last 24 hours and went to [facility] ED for further evaluation.  CT imaging revealed the return of his intra-abdominal fluid collection in the right lower quadrant.  Patient states that he had 1 episode of diarrhea last week but has otherwise had normal bowel movements.  He was tolerating a diet up until the last 24 hours when he developed nausea.  Patient had labs significant for a beta hydroxybutyrate of 4.50, a glucose of 242, pH of 7.14 the patient was diagnosed with DKA and started on insulin infusion and was admitted to medical intensive care for further management. Transitioned to subq insulin. Had abdominal drain placed by IR, fluid cultures sent which grew Streptococcus grondonii and group F beta streptococcus.    Condition at [place] guarded    Exam:     /89   Pulse 86   Temp 97.4 °F (36.3 °C) (Oral)   Resp 16   Ht 6' 2\" (1.88 m)   Wt 288 lb 8 oz (130.9 kg)   SpO2 97%   BMI 37.04 kg/m²     General appearance: No apparent distress, appears stated age and cooperative. HEENT: Conjunctivae/corneas clear. Mucous membranes moist.  Neck: Supple. No JVD. Respiratory:  Clear to auscultation bilaterally. Normal respiratory effort. Cardiovascular:  RRR. S1, S2 without MRG. PV: Pulses palpable. No edema. Abdomen: Soft, non-tender, non-distended. +BS  Musculoskeletal: No obvious deformities. Skin: Normal skin color. No rashes or lesions. Good turgor. Neurologic:  Grossly non-focal. Awake, alert, following commands. Psychiatric: Alert and oriented, thought content appropriate, normal insight and judgement      Consults:     IP CONSULT TO GENERAL SURGERY  IP CONSULT TO INFECTIOUS DISEASES  IP CONSULT TO ENDOCRINOLOGY  IP CONSULT TO GENERAL SURGERY  IP CONSULT TO DIETITIAN  IP CONSULT TO PHARMACY    Significant Diagnostic Studies:     CT ABSCESS DRAINAGE W CATH PLACEMENT S&I   Final Result   Successful uncomplicated  abscess drainage. Recommendations:   1. Follow-up tube check in 2 weeks   2. Flush tube daily with 5 to 10 mL of sterile saline             CT ABDOMEN PELVIS W IV CONTRAST Additional Contrast? None   Final Result   1. There has been reaccumulation of fluid in the right lower quadrant   extraperitoneal collection since prior examination with interval removal of   percutaneous drainage catheter. 2. Colonic diverticulosis without evidence of diverticulitis. CT CHEST WO CONTRAST   Final Result   1. Mildly prominent pulmonary artery, consider pulmonary artery hypertension. 2.  Wall thickening of the mid and distal esophagus, consider esophagitis. 3.  Lungs are clear. XR CHEST PORTABLE   Final Result   No acute process. Disposition:  home     Discharge Instructions/Follow-up:  Keep scheduled follow up appointments. Take medications as prescribed. Code Status:  Prior     Activity: activity as tolerated    Diet: regular diet    Labs:  For convenience and continuity at follow-up the following most recent labs are provided:      CBC:    Lab Results   Component Value Date    WBC 6.2 06/20/2022    HGB 13.5 06/20/2022    HCT 41.7 06/20/2022     06/20/2022       Renal:    Lab Results   Component Value Date     06/20/2022    K 4.3 06/20/2022    K 3.4 03/11/2022     06/20/2022    CO2 24 06/20/2022    BUN 9 06/20/2022    CREATININE 0.6 06/20/2022    CALCIUM 8.7 06/20/2022    PHOS 1.0 05/28/2022       Discharge Medications:     Discharge Medication List as of 5/31/2022  2:15 PM           Details   linezolid (ZYVOX) 600 MG tablet Take 1 tablet by mouth every 12 hours for 14 days, Disp-28 tablet, R-0Print      ertapenem (INVANZ) infusion Infuse 1,000 mg intravenously every 24 hours for 14 days Compound per protocol., Disp-14 g, R-0Print              Details   insulin glargine (LANTUS SOLOSTAR) 100 UNIT/ML injection pen Inject 35 Units into the skin nightly, Disp-20 pen, R-0NO PRINT      insulin lispro (HUMALOG) 100 UNIT/ML pen Inject 15 Units into the skin 3 times daily (before meals), Disp-5 pen, R-0NO PRINT              Details   lisinopril (PRINIVIL;ZESTRIL) 5 MG tablet Take 1 tablet by mouth daily, Disp-90 tablet, R-0Normal      loratadine (CLARITIN) 10 MG tablet Take 1 tablet by mouth daily, Disp-90 tablet, R-0Normal      empagliflozin (JARDIANCE) 10 MG tablet Take 1 tablet by mouth daily, Disp-90 tablet, R-0Normal      atorvastatin (LIPITOR) 40 MG tablet Take 1 tablet by mouth daily, Disp-90 tablet, R-0Normal      !! glucose monitoring (FREESTYLE FREEDOM) kit 4 TIMES DAILY PRN Starting Thu 3/31/2022, Disp-1 kit, R-0, Normal      !! FreeStyle Lancets MISC 4 TIMES DAILY PRN Starting Thu 3/31/2022, Until Wed 6/29/2022 at 2359, For 90 days, Disp-200 each, R-2, Normal      !! blood glucose test strips (FREESTYLE LITE) strip 1 each by In Vitro route daily As needed. , Disp-200 each, R-2Normal      !!  Blood Glucose Monitoring Suppl (ONE TOUCH ULTRA 2) w/Device KIT 4 TIMES DAILY PRN

## 2022-06-21 ENCOUNTER — HOSPITAL ENCOUNTER (OUTPATIENT)
Dept: INFUSION THERAPY | Age: 57
Setting detail: INFUSION SERIES
Discharge: HOME | End: 2022-06-21
Payer: COMMERCIAL

## 2022-06-21 ENCOUNTER — OFFICE VISIT (OUTPATIENT)
Dept: SURGERY | Age: 57
End: 2022-06-21
Payer: COMMERCIAL

## 2022-06-21 VITALS
HEIGHT: 74 IN | WEIGHT: 288 LBS | SYSTOLIC BLOOD PRESSURE: 106 MMHG | BODY MASS INDEX: 36.96 KG/M2 | HEART RATE: 104 BPM | DIASTOLIC BLOOD PRESSURE: 78 MMHG | TEMPERATURE: 97.6 F

## 2022-06-21 VITALS
OXYGEN SATURATION: 98 % | TEMPERATURE: 98 F | SYSTOLIC BLOOD PRESSURE: 91 MMHG | DIASTOLIC BLOOD PRESSURE: 63 MMHG | HEART RATE: 100 BPM | RESPIRATION RATE: 24 BRPM

## 2022-06-21 DIAGNOSIS — K37 APPENDICITIS, UNSPECIFIED APPENDICITIS TYPE: Primary | ICD-10-CM

## 2022-06-21 DIAGNOSIS — K65.1 INTRA-ABDOMINAL ABSCESS (HCC): Primary | ICD-10-CM

## 2022-06-21 PROCEDURE — 3017F COLORECTAL CA SCREEN DOC REV: CPT | Performed by: SURGERY

## 2022-06-21 PROCEDURE — 1036F TOBACCO NON-USER: CPT | Performed by: SURGERY

## 2022-06-21 PROCEDURE — G8417 CALC BMI ABV UP PARAM F/U: HCPCS | Performed by: SURGERY

## 2022-06-21 PROCEDURE — 2580000003 HC RX 258: Performed by: SPECIALIST

## 2022-06-21 PROCEDURE — G8427 DOCREV CUR MEDS BY ELIG CLIN: HCPCS | Performed by: SURGERY

## 2022-06-21 PROCEDURE — 99212 OFFICE O/P EST SF 10 MIN: CPT | Performed by: SURGERY

## 2022-06-21 PROCEDURE — 1111F DSCHRG MED/CURRENT MED MERGE: CPT | Performed by: SURGERY

## 2022-06-21 PROCEDURE — 96365 THER/PROPH/DIAG IV INF INIT: CPT

## 2022-06-21 PROCEDURE — 6360000002 HC RX W HCPCS: Performed by: SPECIALIST

## 2022-06-21 RX ORDER — CEFDINIR 300 MG/1
300 CAPSULE ORAL 2 TIMES DAILY
Qty: 28 CAPSULE | Refills: 0 | Status: SHIPPED | OUTPATIENT
Start: 2022-06-21 | End: 2022-07-05

## 2022-06-21 RX ORDER — LINEZOLID 600 MG/1
600 TABLET, FILM COATED ORAL 2 TIMES DAILY
Qty: 28 TABLET | Refills: 0 | Status: SHIPPED | OUTPATIENT
Start: 2022-06-21 | End: 2022-07-05

## 2022-06-21 RX ORDER — SODIUM CHLORIDE 0.9 % (FLUSH) 0.9 %
5-40 SYRINGE (ML) INJECTION PRN
Status: CANCELLED | OUTPATIENT
Start: 2022-06-21

## 2022-06-21 RX ORDER — HEPARIN SODIUM (PORCINE) LOCK FLUSH IV SOLN 100 UNIT/ML 100 UNIT/ML
500 SOLUTION INTRAVENOUS PRN
Status: CANCELLED | OUTPATIENT
Start: 2022-06-21

## 2022-06-21 RX ORDER — SODIUM CHLORIDE 9 MG/ML
5-250 INJECTION, SOLUTION INTRAVENOUS PRN
Status: CANCELLED | OUTPATIENT
Start: 2022-06-21

## 2022-06-21 RX ORDER — HEPARIN SODIUM (PORCINE) LOCK FLUSH IV SOLN 100 UNIT/ML 100 UNIT/ML
500 SOLUTION INTRAVENOUS PRN
Status: CANCELLED
Start: 2022-06-21

## 2022-06-21 RX ORDER — SODIUM CHLORIDE 0.9 % (FLUSH) 0.9 %
5-40 SYRINGE (ML) INJECTION PRN
Status: DISCONTINUED | OUTPATIENT
Start: 2022-06-21 | End: 2022-06-22 | Stop reason: HOSPADM

## 2022-06-21 RX ORDER — HEPARIN SODIUM (PORCINE) LOCK FLUSH IV SOLN 100 UNIT/ML 100 UNIT/ML
500 SOLUTION INTRAVENOUS PRN
Status: DISCONTINUED | OUTPATIENT
Start: 2022-06-21 | End: 2022-06-22 | Stop reason: HOSPADM

## 2022-06-21 RX ORDER — SODIUM CHLORIDE 0.9 % (FLUSH) 0.9 %
10 SYRINGE (ML) INJECTION PRN
Status: CANCELLED
Start: 2022-06-21

## 2022-06-21 RX ADMIN — Medication 10 ML: at 09:51

## 2022-06-21 RX ADMIN — Medication 10 ML: at 09:25

## 2022-06-21 RX ADMIN — HEPARIN SODIUM (PORCINE) LOCK FLUSH IV SOLN 100 UNIT/ML 500 UNITS: 100 SOLUTION at 09:51

## 2022-06-21 RX ADMIN — ERTAPENEM SODIUM 1000 MG: 1 INJECTION, POWDER, LYOPHILIZED, FOR SOLUTION INTRAMUSCULAR; INTRAVENOUS at 09:26

## 2022-06-21 NOTE — PROGRESS NOTES
General Surgery History and Physical  [facility]    Patient's Name/Date of Birth: Lashawn Colunga / 1965    Date: June 21, 2022     Surgeon: Yasemin Moran MD    PCP: Nichol Cardona MD     Chief Complaint: Recurrent appendicitis with abscess    HPI:   Lashawn Colunga is a 64 y.o. male who presents for evaluation of recent admission for recurrent appendicitis with right lower quadrant abscess status post IR drain x2. The drain has had minimal output. He has finished his course of antibiotics however is yet to see the infectious disease doctor for follow-up. He denies any abdominal pain. Patient Active Problem List   Diagnosis    Diabetic foot ulcer ([address]    Diabetes mellitus ([address]    Osteomyelitis of ankle or foot, left, acute ([address]    Cellulitis of foot    Diabetic arthropathy (HCC)    Gangrene of toe of left foot (HCC)    Morbid (severe) obesity due to excess calories ([address]    Pure hypertriglyceridemia    Diabetic foot infection ([address]    DVT prophylaxis    ISAIAS (acute kidney injury) ([address]    Intra-abdominal abscess ([address]    Vitamin D deficiency    PINEDA (obstructive sleep apnea)    DKA, type 2, not at goal [facility])    Peritoneal abscess [facility])       Past Medical History:   Diagnosis Date    Appendicitis with abscess     appendix not removed as of 5/25/22    Diabetes mellitus ([address]     Gangrene of toe of left foot ([address] 11/17/2017    Hyperlipidemia     Hyperlipidemia     Sleep apnea     Vitamin D deficiency        Past Surgical History:   Procedure Laterality Date    OTHER SURGICAL HISTORY Left 11/17/2017    Amputation of left great toe.  TOE AMPUTATION      TOE AMPUTATION Left 10/21/2019    LEFT SECOND TOE AMPUTATION performed by Leidy Kirk DPM at [address]       No Known Allergies    The patient has a family history that is negative for severe cardiovascular or respiratory issues, negative for reaction to anesthesia.      Time spent reviewing past medical, surgical, social and family history, vitals, nursing assessment and images. No changes from above documented history. Social History     Socioeconomic History    Marital status:      Spouse name: Not on file    Number of children: Not on file    Years of education: Not on file    Highest education level: Not on file   Occupational History    Not on file   Tobacco Use    Smoking status: Never Smoker    Smokeless tobacco: Never Used   Substance and Sexual Activity    Alcohol use: No    Drug use: No    Sexual activity: Never   Other Topics Concern    Not on file   Social History Narrative    Not on file     Social Determinants of Health     Financial Resource Strain: Medium Risk    Difficulty of Paying Living Expenses: Somewhat hard   Food Insecurity: No Food Insecurity    Worried About Running Out of Food in the Last Year: Never true    Suraj of Food in the Last Year: Never true   Transportation Needs:     Lack of Transportation (Medical): Not on file    Lack of Transportation (Non-Medical):  Not on file   Physical Activity:     Days of Exercise per Week: Not on file    Minutes of Exercise per Session: Not on file   Stress:     Feeling of Stress : Not on file   Social Connections:     Frequency of Communication with Friends and Family: Not on file    Frequency of Social Gatherings with Friends and Family: Not on file    Attends Quaker Services: Not on file    Active Member of [address] [place] [address] [employer] or Organizations: Not on file    Attends Club or Organization Meetings: Not on file    Marital Status: Not on file   Intimate Partner Violence:     Fear of Current or Ex-Partner: Not on file    Emotionally Abused: Not on file    Physically Abused: Not on file    Sexually Abused: Not on file   Housing Stability:     Unable to Pay for Housing in the Last Year: Not on file    Number of [place] in the Last Year: Not on file    Unstable Housing in the Last Year: Not on file       I have reviewed relevant labs from this admission and interpretation is included in my assessment and plan    Review of Systems    A complete 10 system review was performed and are otherwise negative unless mentioned in the above HPI. Specific negatives are listed below but may not include all those reviewed. General ROS: negative obtundation, AMS  ENT ROS: negative rhinorrhea, epistaxis  Allergy and Immunology ROS: negative itchy/watery eyes or nasal congestion  Hematological and Lymphatic ROS: negative spontaneous bleeding or bruising  Endocrine ROS: negative  lethargy, mood swings, palpitations or polydipsia/polyuria  Respiratory ROS: negative sputum changes, stridor, tachypnea or wheezing  Cardiovascular ROS: negative for - loss of consciousness, murmur or orthopnea  Gastrointestinal ROS: negative for - hematochezia or hematemesis  Genito-Urinary ROS: negative for -  genital discharge or hematuria  Musculoskeletal ROS: negative for - focal weakness, gangrene  Psych/Neuro ROS: negative for - visual or auditory hallucinations, suicidal ideation    Physical exam:   /78   Pulse (!) 104   Temp 97.6 °F (36.4 °C) (Temporal)   Ht 6' 2\" (1.88 m)   Wt 288 lb (130.6 kg)   BMI 36.98 kg/m²   General appearance:  NAD, appears stated age  Head: NCAT, PERRLA, EOMI, red conjunctiva  Neck: supple, no masses, trachea midline  Lungs: Equal chest rise bilateral, no retractions, no wheezing  Heart: Reg rate  Abdomen: soft, nontender, nondistended. Right lower quadrant drain with serous output.   Drain removed  Skin; warm and dry, no cyanosis  Gu: no cva tenderness  Extremities: atraumatic, no focal motor deficits, no open wounds  Psych: No tremor, visual hallucinations      Radiology: N/A    Assessment:  Yudith Feng is a 64 y.o. male with recurrent appendicitis with right lower quadrant abscess status post IR drain, doing well  Patient Active Problem List   Diagnosis    Diabetic foot ulcer [address]    Diabetes mellitus ([address]    Osteomyelitis of ankle or foot, left, acute ([address]    Cellulitis of foot    Diabetic arthropathy ([address]    Gangrene of toe of left foot ([address]    Morbid (severe) obesity due to excess calories ([address]    Pure hypertriglyceridemia    Diabetic foot infection ([address]    DVT prophylaxis    ISAIAS (acute kidney injury) ([address]    Intra-abdominal abscess ([address]    Vitamin D deficiency    PINEDA (obstructive sleep apnea)    DKA, type 2, not at goal [facility])    Peritoneal abscess ([address]         Plan:  Drain removed in office  Patient to follow-up with ID for possible PICC line removal if no further IV antibiotics required  He will be scheduled for a colonoscopy and pending findings, may need laparoscopic interval appendectomy  Patient understands that due to his recurrent inflammation in that area, he may require an ileocecectomy  He understands and agrees with the risks, benefits and alternatives of the procedure discussed with them.         Binh Mcclellan MD  12:05 PM

## 2022-06-22 ENCOUNTER — HOSPITAL ENCOUNTER (OUTPATIENT)
Dept: INFUSION THERAPY | Age: 57
Setting detail: INFUSION SERIES
Discharge: HOME | End: 2022-06-22
Payer: COMMERCIAL

## 2022-06-22 DIAGNOSIS — K65.1 INTRA-ABDOMINAL ABSCESS (HCC): Primary | ICD-10-CM

## 2022-06-22 PROCEDURE — 99211 OFF/OP EST MAY X REQ PHY/QHP: CPT

## 2022-06-22 PROCEDURE — 2580000003 HC RX 258: Performed by: SPECIALIST

## 2022-06-22 PROCEDURE — 96523 IRRIG DRUG DELIVERY DEVICE: CPT

## 2022-06-22 RX ORDER — HEPARIN SODIUM (PORCINE) LOCK FLUSH IV SOLN 100 UNIT/ML 100 UNIT/ML
500 SOLUTION INTRAVENOUS PRN
Start: 2022-06-22

## 2022-06-22 RX ORDER — SODIUM CHLORIDE 0.9 % (FLUSH) 0.9 %
5-40 SYRINGE (ML) INJECTION PRN
OUTPATIENT
Start: 2022-06-22

## 2022-06-22 RX ORDER — SODIUM CHLORIDE 0.9 % (FLUSH) 0.9 %
5-40 SYRINGE (ML) INJECTION PRN
Status: DISCONTINUED | OUTPATIENT
Start: 2022-06-22 | End: 2022-06-23 | Stop reason: HOSPADM

## 2022-06-22 RX ORDER — SODIUM CHLORIDE 0.9 % (FLUSH) 0.9 %
10 SYRINGE (ML) INJECTION PRN
Start: 2022-06-22

## 2022-06-22 RX ORDER — SODIUM CHLORIDE 9 MG/ML
5-250 INJECTION, SOLUTION INTRAVENOUS PRN
OUTPATIENT
Start: 2022-06-22

## 2022-06-22 RX ORDER — HEPARIN SODIUM (PORCINE) LOCK FLUSH IV SOLN 100 UNIT/ML 100 UNIT/ML
500 SOLUTION INTRAVENOUS PRN
OUTPATIENT
Start: 2022-06-22

## 2022-06-22 RX ADMIN — Medication 10 ML: at 11:33

## 2022-06-27 ENCOUNTER — TELEPHONE (OUTPATIENT)
Dept: SURGERY | Age: 57
End: 2022-06-27

## 2022-06-27 RX ORDER — POLYETHYLENE GLYCOL 3350, SODIUM SULFATE ANHYDROUS, SODIUM BICARBONATE, SODIUM CHLORIDE, POTASSIUM CHLORIDE 236; 22.74; 6.74; 5.86; 2.97 G/4L; G/4L; G/4L; G/4L; G/4L
4 POWDER, FOR SOLUTION ORAL ONCE
Qty: 4000 ML | Refills: 0 | Status: SHIPPED | OUTPATIENT
Start: 2022-06-27 | End: 2022-06-27

## 2022-06-27 NOTE — TELEPHONE ENCOUNTER
Per Dr. Miguel Ángel Salazar, patient is scheduled for Colonoscopy possible biopsy or polypectomy at [facility] on 22. Surgery scheduled via iqueue, surgeon's calendar updated. Dr. Miguel Ángel Salazar to enter orders. Follow up appointment scheduled. Golytely bowel prep instructions provided and discussed. Electronically signed by Ross Callejas RN on 2022 at 2:55 PM     Due to a change in Dr. Estrada Frames schedule, colonoscopy has been moved to 22. Surgery scheduling notified, surgeons calendar updated. Follow up appointment rescheduled. Electronically signed by Ross Callejas RN on 2022 at 11:08 AM      Prior Authorization Form:      DEMOGRAPHICS:                     Patient Name:  John Hill  Patient :  1965            Insurance:  Payor: Isha Malone / Plan: Alycia Bartholomew / Product Type: *No Product type* /   Insurance ID Number:    Payor/Plan Subscr  Sex Relation Sub.  Ins. ID Effective Group Num   1. DOMI Bojorquez [place] 1965 Male Self [id number] [address]         DIAGNOSIS & PROCEDURE:                       Procedure/Operation: Colonoscopy possible biopsy or polypectomy           CPT Code: 01496    Diagnosis:  diverticulitis    ICD10 Code: k57.92    Location:  [facility]    Surgeon:  Abdoul Ronquillo INFORMATION:                          Date: 22    Time: TBD              Anesthesia:  MAC/TIVA                                                       Status:  Outpatient        Special Comments:         Electronically signed by Ross Callejas RN on 2022 at 2:56 PM

## 2022-07-02 ENCOUNTER — ANESTHESIA EVENT (OUTPATIENT)
Dept: ENDOSCOPY | Age: 57
End: 2022-07-02
Payer: COMMERCIAL

## 2022-07-02 NOTE — ANESTHESIA PRE PROCEDURE
Department of Anesthesiology  Preprocedure Note       Name:  Marco A Hyatt   Age:  64 y.o.  :  1965                                          MRN:  [id number]         Date:  2022      Surgeon: Amy Santos):  Kali Duvall MD    Procedure: COLONOSCOPY POSSIBLE BIOPSY OR POLYPECTOMY (N/A )    Medications prior to admission:   Prior to Admission medications    Medication Sig Start Date End Date Taking? Authorizing Provider   linezolid (ZYVOX) 600 MG tablet Take 1 tablet by mouth 2 times daily for 14 days 22  Carl Ly MD   cefdinir (OMNICEF) 300 MG capsule Take 1 capsule by mouth 2 times daily for 14 days 22  Carl Ly MD   insulin glargine (LANTUS SOLOSTAR) 100 UNIT/ML injection pen Inject 35 Units into the skin nightly 22   Sharon Huggins MD   insulin lispro (HUMALOG) 100 UNIT/ML pen Inject 15 Units into the skin 3 times daily (before meals) 22   Davion Martines MD   lisinopril (PRINIVIL;ZESTRIL) 5 MG tablet Take 1 tablet by mouth daily 22   Betito Harvey MD   loratadine (CLARITIN) 10 MG tablet Take 1 tablet by mouth daily 22   Betito Harvey MD   empagliflozin (JARDIANCE) 10 MG tablet Take 1 tablet by mouth daily 3/31/22   Betito Rasmussen MD   atorvastatin (LIPITOR) 40 MG tablet Take 1 tablet by mouth daily 3/31/22   Betito Rasmussen MD   glucose monitoring (FREESTYLE FREEDOM) kit 1 kit by Does not apply route 4 times daily as needed (monitoring blood sugars) 3/31/22   Betito Harvey MD   FreeStyle Lancets MISC 1 each by Does not apply route 4 times daily as needed (monitor blood sugar) 3/31/22 6/29/22  Betito Harvey MD   blood glucose test strips (FREESTYLE LITE) strip 1 each by In Vitro route daily As needed. 3/31/22   Betito Harvey MD   Blood Glucose Monitoring Suppl (ONE TOUCH ULTRA 2) w/Device KIT 1 kit by Does not apply route 4 times daily as needed (monitoring blood sugar) Check blood sugar 4 times daily and prn.  Please provide diabetic blood glucose test strips (FREESTYLE LITE) strip 1 each by In Vitro route daily As needed. 200 each 2    Blood Glucose Monitoring Suppl (ONE TOUCH ULTRA 2) w/Device KIT 1 kit by Does not apply route 4 times daily as needed (monitoring blood sugar) Check blood sugar 4 times daily and prn. Please provide diabetic supplies for use with this monitor 1 kit 0    blood glucose test strips (ONETOUCH ULTRA) strip 1 each by In Vitro route 4 times daily as needed (monitor blood sugar with insulin use) As needed. 360 each 0    ONE TOUCH ULTRASOFT LANCETS MISC 1 each by Does not apply route 4 times daily as needed (monitor blood sugar with insulin use) 360 each 0    acetaminophen (TYLENOL) 500 MG tablet Take 2 tablets by mouth every 8 hours as needed for Pain 60 tablet 0    Blood Glucose Monitoring Suppl (D-CARE GLUCOMETER) w/Device KIT 1 each by Does not apply route 4 times daily (with meals and nightly) 1 kit 0    Insulin Pen Needle 32G X 4 MM MISC 1 each by Does not apply route 4 times daily (before meals and nightly) 100 each 0     No current facility-administered medications for this visit.        Allergies:  No Known Allergies    Problem List:    Patient Active Problem List   Diagnosis Code    Diabetic foot ulcer ([address] E11.621, L97.509    Diabetes mellitus ([address] E11.9    Osteomyelitis of ankle or foot, left, acute ([address] M86.172    Cellulitis of foot L03.119    Diabetic arthropathy ([address] E11.618    Gangrene of toe of left foot ([address] I96    Morbid (severe) obesity due to excess calories (HCC) E66.01    Pure hypertriglyceridemia E78.1    Diabetic foot infection ([address] E11.628, L08.9    DVT prophylaxis Z29.9    ISAIAS (acute kidney injury) ([address] N17.9    Intra-abdominal abscess ([address] K65.1    Vitamin D deficiency E55.9    PINEDA (obstructive sleep apnea) G47.33    DKA, type 2, not at goal [facility]) E11.10    Peritoneal abscess ([address] K65.1       Past Medical History:        Diagnosis Date    Appendicitis with abscess appendix not removed as of 5/25/22    Diabetes mellitus ([facility]     Gangrene of toe of left foot ([facility] 11/17/2017    Hyperlipidemia     Hyperlipidemia     Sleep apnea     Vitamin D deficiency        Past Surgical History:        Procedure Laterality Date    OTHER SURGICAL HISTORY Left 11/17/2017    Amputation of left great toe.  TOE AMPUTATION      TOE AMPUTATION Left 10/21/2019    LEFT SECOND TOE AMPUTATION performed by Carolynne Oppenheim, DPM at [address] History:    Social History     Tobacco Use    Smoking status: Never Smoker    Smokeless tobacco: Never Used   Substance Use Topics    Alcohol use: No                                Counseling given: Not Answered      Vital Signs (Current): There were no vitals filed for this visit.                                            BP Readings from Last 3 Encounters:   06/21/22 91/63   06/21/22 106/78   06/20/22 116/65       NPO Status:  > 12 hours                                                                                BMI:   Wt Readings from Last 3 Encounters:   06/21/22 288 lb (130.6 kg)   05/30/22 288 lb 8 oz (130.9 kg)   11/23/21 (!) 345 lb (156.5 kg)     There is no height or weight on file to calculate BMI.    CBC:   Lab Results   Component Value Date/Time    WBC 6.2 06/20/2022 09:30 AM    RBC 4.68 06/20/2022 09:30 AM    HGB 13.5 06/20/2022 09:30 AM    HCT 41.7 06/20/2022 09:30 AM    MCV 89.1 06/20/2022 09:30 AM    RDW 14.3 06/20/2022 09:30 AM     06/20/2022 09:30 AM       CMP:   Lab Results   Component Value Date/Time     06/20/2022 09:30 AM    K 4.3 06/20/2022 09:30 AM    K 3.4 03/11/2022 04:34 AM     06/20/2022 09:30 AM    CO2 24 06/20/2022 09:30 AM    BUN 9 06/20/2022 09:30 AM    CREATININE 0.6 06/20/2022 09:30 AM    GFRAA >60 06/20/2022 09:30 AM    LABGLOM >60 06/20/2022 09:30 AM    GLUCOSE 257 06/20/2022 09:30 AM    GLUCOSE 191 04/01/2011 11:50 AM    PROT 7.1 06/20/2022 09:30 AM    CALCIUM 8.7 06/20/2022 09:30 AM    BILITOT 0.3 06/20/2022 09:30 AM    ALKPHOS 144 06/20/2022 09:30 AM    AST 16 06/20/2022 09:30 AM    ALT 16 06/20/2022 09:30 AM       POC Tests: No results for input(s): POCGLU, POCNA, POCK, POCCL, POCBUN, POCHEMO, POCHCT in the last 72 hours.     Coags:   Lab Results   Component Value Date/Time    PROTIME 13.1 05/25/2022 09:39 PM    INR 1.2 05/25/2022 09:39 PM    APTT 25.7 03/06/2022 08:56 AM       HCG (If Applicable): No results found for: PREGTESTUR, PREGSERUM, HCG, HCGQUANT     ABGs: No results found for: PHART, PO2ART, EYP0HDT, MAE9THV, BEART, O8PHLBXA     Type & Screen (If Applicable):  No results found for: LABABO, LABRH     EKG 10/18/2019  Component Value Ref Range & Units Status Collected Lab   Ventricular Rate 95  BPM Final 10/18/2019 12:59 PM [facility]   Atrial Rate 95  BPM Final 10/18/2019 12:59 PM [facility]   P-R Interval 208  ms Final 10/18/2019 12:59 PM [facility]   QRS Duration 78  ms Final 10/18/2019 12:59 PM [facility]   Q-T Interval 348  ms Final 10/18/2019 12:59 PM [facility]   QTc Calculation (Bazett) 437  ms Final 10/18/2019 12:59 PM [facility]   P [place] 42  degrees Final 10/18/2019 12:59 PM [facility]   R [place] 3  degrees Final 10/18/2019 12:59 PM [facility]   T Axis 45  degrees Final 10/18/2019 12:59 PM [facility]   Testing Performed By     Lab - Abbreviation Name Director Address Valid Date Range   360-[facility] MUSE Unknown Unknown 04/18/16 0721-Present   Narrative   Performed by: [facility]   Normal sinus rhythm  Normal ECG         Anesthesia Evaluation  Patient summary reviewed no history of anesthetic complications:   Airway: Mallampati: IV  TM distance: <3 FB   Neck ROM: full  Mouth opening: < 3 FB   Dental:          Pulmonary:   (+) sleep apnea: on CPAP,  decreased breath sounds: bilateral     (-) not a current smoker                          ROS comment: Seasonal allergies   Cardiovascular:Negative CV ROS  Exercise tolerance: good (>4 METS),   (+) hypertension: moderate, hyperlipidemia    (-) past MI, CAD, CABG/stent, dysrhythmias and  angina    ECG reviewed  Rhythm: regular  Rate: normal           Beta Blocker:  Not on Beta Blocker         Neuro/Psych:   Negative Neuro/Psych ROS     (-) seizures, TIA and CVA            ROS comment: Polyneuropathy  GI/Hepatic/Renal:   (+) renal disease: ARF, morbid obesity     (-) hepatitis      ROS comment: History of peritoneal abscess. Endo/Other:    (+) DiabetesType II DM, poorly controlled, using insulin, .    (-) blood dyscrasia        Pt had no PAT visit        ROS comment: Gangrene of toe- left   + Osteomyelitis   Hx of prior toe amputation  Abdominal:   (+) obese,           Vascular: negative vascular ROS.  + PVD, aortic or cerebral, .  - DVT and PE. Other Findings:             Anesthesia Plan      MAC     ASA 3     (  )  Induction: intravenous. Anesthetic plan and risks discussed with patient. Plan discussed with CRNA. Dillon Soto MD   7/2/2022    Pt seen, examined, chart reviewed, plan discussed. Dillon Soto MD  7/2/2022  4:06 PM    DOS STAFF ADDENDUM:    Patient seen and chart reviewed on DOS. No interval change in history or exam.   I agree with the anesthesia pre-operative assessment written above and have made the appropriate addendums and/or changes. Anesthesia plan discussed and risks/benefits addressed. Patient's concerns and questions answered. NPO >8 hours.      Kaiden Lopez DO  August 8, 2022  9:19 AM

## 2022-07-07 ENCOUNTER — TELEPHONE (OUTPATIENT)
Dept: SURGERY | Age: 57
End: 2022-07-07

## 2022-07-07 NOTE — TELEPHONE ENCOUNTER
Pt contacted about rescheduling his Colonoscopy to 08/08/22. Unable to reach patient, voicemail left to contact office about rescheduling details.

## 2022-07-25 ENCOUNTER — ANESTHESIA (OUTPATIENT)
Dept: ENDOSCOPY | Age: 57
End: 2022-07-25
Payer: COMMERCIAL

## 2022-08-08 ENCOUNTER — HOSPITAL ENCOUNTER (OUTPATIENT)
Age: 57
Setting detail: OUTPATIENT SURGERY
Discharge: HOME | End: 2022-08-08
Attending: SURGERY | Admitting: SURGERY
Payer: COMMERCIAL

## 2022-08-08 VITALS
HEIGHT: 74 IN | SYSTOLIC BLOOD PRESSURE: 118 MMHG | RESPIRATION RATE: 18 BRPM | WEIGHT: 310 LBS | DIASTOLIC BLOOD PRESSURE: 61 MMHG | BODY MASS INDEX: 39.78 KG/M2 | TEMPERATURE: 98 F | HEART RATE: 86 BPM | OXYGEN SATURATION: 94 %

## 2022-08-08 DIAGNOSIS — K57.92 DIVERTICULITIS: ICD-10-CM

## 2022-08-08 LAB — METER GLUCOSE: 180 MG/DL (ref 74–99)

## 2022-08-08 PROCEDURE — 7100000010 HC PHASE II RECOVERY - FIRST 15 MIN: Performed by: SURGERY

## 2022-08-08 PROCEDURE — 88305 TISSUE EXAM BY PATHOLOGIST: CPT

## 2022-08-08 PROCEDURE — 3609010300 HC COLONOSCOPY W/BIOPSY SINGLE/MULTIPLE: Performed by: SURGERY

## 2022-08-08 PROCEDURE — 2580000003 HC RX 258: Performed by: NURSE ANESTHETIST, CERTIFIED REGISTERED

## 2022-08-08 PROCEDURE — 82962 GLUCOSE BLOOD TEST: CPT

## 2022-08-08 PROCEDURE — 3700000001 HC ADD 15 MINUTES (ANESTHESIA): Performed by: SURGERY

## 2022-08-08 PROCEDURE — 2500000003 HC RX 250 WO HCPCS: Performed by: NURSE ANESTHETIST, CERTIFIED REGISTERED

## 2022-08-08 PROCEDURE — 3700000000 HC ANESTHESIA ATTENDED CARE: Performed by: SURGERY

## 2022-08-08 PROCEDURE — 6360000002 HC RX W HCPCS: Performed by: NURSE ANESTHETIST, CERTIFIED REGISTERED

## 2022-08-08 PROCEDURE — 7100000011 HC PHASE II RECOVERY - ADDTL 15 MIN: Performed by: SURGERY

## 2022-08-08 PROCEDURE — 2709999900 HC NON-CHARGEABLE SUPPLY: Performed by: SURGERY

## 2022-08-08 PROCEDURE — 45380 COLONOSCOPY AND BIOPSY: CPT | Performed by: SURGERY

## 2022-08-08 RX ORDER — SODIUM CHLORIDE 9 MG/ML
25 INJECTION, SOLUTION INTRAVENOUS PRN
Status: DISCONTINUED | OUTPATIENT
Start: 2022-08-08 | End: 2022-08-08 | Stop reason: HOSPADM

## 2022-08-08 RX ORDER — LIDOCAINE HYDROCHLORIDE 20 MG/ML
INJECTION, SOLUTION EPIDURAL; INFILTRATION; INTRACAUDAL; PERINEURAL PRN
Status: DISCONTINUED | OUTPATIENT
Start: 2022-08-08 | End: 2022-08-08 | Stop reason: SDUPTHER

## 2022-08-08 RX ORDER — SODIUM CHLORIDE 0.9 % (FLUSH) 0.9 %
5-40 SYRINGE (ML) INJECTION PRN
Status: DISCONTINUED | OUTPATIENT
Start: 2022-08-08 | End: 2022-08-08 | Stop reason: HOSPADM

## 2022-08-08 RX ORDER — PROPOFOL 10 MG/ML
INJECTION, EMULSION INTRAVENOUS PRN
Status: DISCONTINUED | OUTPATIENT
Start: 2022-08-08 | End: 2022-08-08 | Stop reason: SDUPTHER

## 2022-08-08 RX ORDER — SODIUM CHLORIDE 9 MG/ML
INJECTION, SOLUTION INTRAVENOUS CONTINUOUS PRN
Status: DISCONTINUED | OUTPATIENT
Start: 2022-08-08 | End: 2022-08-08 | Stop reason: SDUPTHER

## 2022-08-08 RX ORDER — PROPOFOL 10 MG/ML
INJECTION, EMULSION INTRAVENOUS CONTINUOUS PRN
Status: DISCONTINUED | OUTPATIENT
Start: 2022-08-08 | End: 2022-08-08 | Stop reason: SDUPTHER

## 2022-08-08 RX ORDER — SODIUM CHLORIDE 9 MG/ML
INJECTION, SOLUTION INTRAVENOUS CONTINUOUS
Status: DISCONTINUED | OUTPATIENT
Start: 2022-08-08 | End: 2022-08-08 | Stop reason: HOSPADM

## 2022-08-08 RX ORDER — SODIUM CHLORIDE 0.9 % (FLUSH) 0.9 %
5-40 SYRINGE (ML) INJECTION EVERY 12 HOURS SCHEDULED
Status: DISCONTINUED | OUTPATIENT
Start: 2022-08-08 | End: 2022-08-08 | Stop reason: HOSPADM

## 2022-08-08 RX ADMIN — LIDOCAINE HYDROCHLORIDE 160 MG: 20 INJECTION, SOLUTION EPIDURAL; INFILTRATION; INTRACAUDAL; PERINEURAL at 09:27

## 2022-08-08 RX ADMIN — PROPOFOL 20 MG: 10 INJECTION, EMULSION INTRAVENOUS at 09:39

## 2022-08-08 RX ADMIN — PROPOFOL 50 MG: 10 INJECTION, EMULSION INTRAVENOUS at 09:27

## 2022-08-08 RX ADMIN — PROPOFOL 50 MG: 10 INJECTION, EMULSION INTRAVENOUS at 09:28

## 2022-08-08 RX ADMIN — PROPOFOL 20 MG: 10 INJECTION, EMULSION INTRAVENOUS at 09:42

## 2022-08-08 RX ADMIN — PROPOFOL 20 MG: 10 INJECTION, EMULSION INTRAVENOUS at 09:35

## 2022-08-08 RX ADMIN — SODIUM CHLORIDE: 9 INJECTION, SOLUTION INTRAVENOUS at 09:24

## 2022-08-08 RX ADMIN — PROPOFOL 120 MCG/KG/MIN: 10 INJECTION, EMULSION INTRAVENOUS at 09:27

## 2022-08-08 RX ADMIN — PROPOFOL 40 MG: 10 INJECTION, EMULSION INTRAVENOUS at 09:30

## 2022-08-08 ASSESSMENT — PAIN - FUNCTIONAL ASSESSMENT: PAIN_FUNCTIONAL_ASSESSMENT: NONE - DENIES PAIN

## 2022-08-08 ASSESSMENT — PAIN SCALES - GENERAL: PAINLEVEL_OUTOF10: 0

## 2022-08-08 ASSESSMENT — LIFESTYLE VARIABLES: SMOKING_STATUS: 0

## 2022-08-08 NOTE — DISCHARGE INSTRUCTIONS
after the test to replace the fluids you may have lostduring the colon prep. But don't drink alcohol. Your doctor will talk to you about when you'll need your next colonoscopy. The results of your test and your risk for colorectal cancer will help your doctordecide how often you need to be checked. After the test, you may be bloated or have gas pains. You may need to pass gas. If a biopsy was done or a polyp was removed, you may have streaks of blood in your stool (feces) for a few days. Check with your doctor to see when it issafe to take aspirin and nonsteroidal anti-inflammatory drugs (NSAIDs) again. Problems such as heavy rectal bleeding may not occur until several weeks afterthe test. This isn't common. But it can happen after polyps are removed. Follow-up care is a key part of your treatment and safety. Be sure to make and go to all appointments, and call your doctor if you are having problems. It's also a good idea to know your test results and keep alist of the medicines you take. Where can you learn more? Go to [email or web address] org and sign in to your [employer] account. Enter W073 in the [employer] box to learn more about \"Learning About Colonoscopy. \"     If you do not have an account, please click on the \"Sign Up Now\" link. Current as of: September 8, 2021               Content Version: 13.3  © 2006-2022 Healthwise, Incorporated. Care instructions adapted under license by [facility] ([facility]). If you have questions about a medical condition or this instruction, always ask your healthcare professional. [place] [address] any warranty or liability for your use of this information.

## 2022-08-08 NOTE — ANESTHESIA POSTPROCEDURE EVALUATION
Department of Anesthesiology  Postprocedure Note    Patient: Rylee Bryant  MRN: [id number]  YOB: 1965  Date of evaluation: 8/8/2022      Procedure Summary     Date: 08/08/22 Room / Location: [address] [place] [address] 01 / [facility]    Anesthesia Start: 8655 Anesthesia Stop: 6683    Procedure: COLONOSCOPY WITH BIOPSY Diagnosis:       Diverticulitis      (Diverticulitis [K57.92])    Surgeons: Dennie Fennel, MD Responsible Provider: Rio Escalera DO    Anesthesia Type: MAC ASA Status: 3          Anesthesia Type: No value filed.     Janee Phase I: Janee Score: 10    Janee Phase II:        Anesthesia Post Evaluation    Patient location during evaluation: bedside  Patient participation: complete - patient participated  Level of consciousness: awake  Pain score: 1  Airway patency: patent  Nausea & Vomiting: no vomiting and no nausea  Complications: no  Cardiovascular status: hemodynamically stable  Respiratory status: acceptable  Hydration status: stable

## 2022-08-09 NOTE — H&P
General Surgery History and Physical  [facility]    Patient's Name/Date of Birth: Adebayo Costa / 1965    Date: August 9, 2022     Surgeon: Kiran Salter MD    PCP: Alok Meredith MD     Chief Complaint: Recurrent appendicitis with abscess    HPI:   Adebayo Costa is a 64 y.o. male who presents for evaluation of recent admission for recurrent appendicitis with right lower quadrant abscess status post IR drain x2. The drain has had minimal output. He has finished his course of antibiotics however is yet to see the infectious disease doctor for follow-up. He denies any abdominal pain. Patient Active Problem List   Diagnosis    Diabetic foot ulcer ([address]    Diabetes mellitus ([address]    Osteomyelitis of ankle or foot, left, acute ([address]    Cellulitis of foot    Diabetic arthropathy (HCC)    Gangrene of toe of left foot (HCC)    Morbid (severe) obesity due to excess calories (HCC)    Pure hypertriglyceridemia    Diabetic foot infection (HCC)    DVT prophylaxis    ISAIAS (acute kidney injury) ([address]    Intra-abdominal abscess (HCC)    Vitamin D deficiency    PINEDA (obstructive sleep apnea)    DKA, type 2, not at goal [facility])    Peritoneal abscess [facility])       Past Medical History:   Diagnosis Date    Appendicitis with abscess     appendix not removed as of 5/25/22    Diabetes mellitus ([address]     Gangrene of toe of left foot ([address] 11/17/2017    Hyperlipidemia     Hyperlipidemia     Sleep apnea     Vitamin D deficiency        Past Surgical History:   Procedure Laterality Date    COLONOSCOPY N/A 8/8/2022    COLONOSCOPY WITH BIOPSY performed by Kiran Salter MD at [address] [facility] Left 11/17/2017    Amputation of left great toe.     TOE AMPUTATION      TOE AMPUTATION Left 10/21/2019    LEFT SECOND TOE AMPUTATION performed by Caleb Seals DPM at [address]       No Known Allergies    The patient has a family history that is negative for severe cardiovascular or respiratory issues, negative for reaction to anesthesia. Time spent reviewing past medical, surgical, social and family history, vitals, nursing assessment and images. No changes from above documented history. Social History     Socioeconomic History    Marital status:      Spouse name: Not on file    Number of children: Not on file    Years of education: Not on file    Highest education level: Not on file   Occupational History    Not on file   Tobacco Use    Smoking status: Never    Smokeless tobacco: Never   Substance and Sexual Activity    Alcohol use: No    Drug use: No    Sexual activity: Never   Other Topics Concern    Not on file   Social History Narrative    Not on file     Social Determinants of Health     Financial Resource Strain: Medium Risk    Difficulty of Paying Living Expenses: Somewhat hard   Food Insecurity: No Food Insecurity    Worried About Running Out of Food in the Last Year: Never true    Ran Out of Food in the Last Year: Never true   Transportation Needs: Not on file   Physical Activity: Not on file   Stress: Not on file   Social Connections: Not on file   Intimate Partner Violence: Not on file   Housing Stability: Not on file       I have reviewed relevant labs from this admission and interpretation is included in my assessment and plan    Review of Systems    A complete 10 system review was performed and are otherwise negative unless mentioned in the above HPI. Specific negatives are listed below but may not include all those reviewed.     General ROS: negative obtundation, AMS  ENT ROS: negative rhinorrhea, epistaxis  Allergy and Immunology ROS: negative itchy/watery eyes or nasal congestion  Hematological and Lymphatic ROS: negative spontaneous bleeding or bruising  Endocrine ROS: negative  lethargy, mood swings, palpitations or polydipsia/polyuria  Respiratory ROS: negative sputum changes, stridor, tachypnea or wheezing  Cardiovascular ROS: negative for - loss of consciousness, murmur or orthopnea  Gastrointestinal ROS: negative for - hematochezia or hematemesis  Genito-Urinary ROS: negative for -  genital discharge or hematuria  Musculoskeletal ROS: negative for - focal weakness, gangrene  Psych/Neuro ROS: negative for - visual or auditory hallucinations, suicidal ideation    Physical exam:   /61   Pulse 86   Temp 98 °F (36.7 °C) (Infrared)   Resp 18   Ht 6' 2\" (1.88 m)   Wt (!) 310 lb (140.6 kg)   SpO2 94%   BMI 39.80 kg/m²   General appearance:  NAD, appears stated age  Head: NCAT, PERRLA, EOMI, red conjunctiva  Neck: supple, no masses, trachea midline  Lungs: Equal chest rise bilateral, no retractions, no wheezing  Heart: Reg rate  Abdomen: soft, nontender, nondistended. Right lower quadrant drain with serous output.   Drain removed  Skin; warm and dry, no cyanosis  Gu: no cva tenderness  Extremities: atraumatic, no focal motor deficits, no open wounds  Psych: No tremor, visual hallucinations      Radiology: N/A    Assessment:  Rylee Bryant is a 64 y.o. male with recurrent appendicitis with right lower quadrant abscess status post IR drain, doing well  Patient Active Problem List   Diagnosis    Diabetic foot ulcer ([address]    Diabetes mellitus ([address]    Osteomyelitis of ankle or foot, left, acute ([address]    Cellulitis of foot    Diabetic arthropathy ([address]    Gangrene of toe of left foot ([address]    Morbid (severe) obesity due to excess calories ([address]    Pure hypertriglyceridemia    Diabetic foot infection ([address]    DVT prophylaxis    ISAIAS (acute kidney injury) ([address]    Intra-abdominal abscess (HCC)    Vitamin D deficiency    PINEDA (obstructive sleep apnea)    DKA, type 2, not at goal [facility])    Peritoneal abscess ([address]         Plan:  Proceed with colonoscopy and pending findings, may need laparoscopic interval appendectomy  Patient understands that due to his recurrent inflammation in that area, he may require an ileocecectomy  He understands and agrees with the risks, benefits and alternatives of the procedure discussed with them.         Dennie Fennel, MD  8:43 AM

## 2022-08-09 NOTE — OP NOTE
[place] [address] [facility]  Colonoscopy Operative Report    DATE OF PROCEDURE: 8/9/2022     PREOPERATIVE DIAGNOSIS: Recurrent perforated appendicitis, no prior colonoscopy    POSTOPERATIVE DIAGNOSIS/FINDINGS: Same, mucosal abnormality of ileocecal valve, otherwise normal colon    SURGEON: Kenneth Ramirez MD    ASSISTANT: None    OPERATION: Total colonoscopy to the cecum with forceps biopsy of ileocecal valve    ANESTHESIA: Local monitored anesthesia. COMPLICATIONS: None. EBL: None    Prep Scale: Right/Transverse/Left = 3/3/3    PRIOR TO THE EXAM: Gen: comfortable, no distress, awake and alert; Lungs: Clear;  Heart:regular rate and rhythm, normal S1S2     BRIEF HISTORY:  This is a 64 y.o. male who presents for diagnostic colonoscopy. He has had 2 episodes of perforated appendicitis over the last several months requiring IR drainage of right lower quadrant abscess. He has never had a colonoscopy. . The patient has no personal and family history of colon cancer. My recommendation is to proceed with colonoscopy. The patient was advised of the risks, benefits, complications and options including the risk of bleeding and perforation. The patient understood and agreed to proceed. PROCEDURE:  In the left side down decubitus position, a digital rectal exam was performed. There were no masses or abnormalities noted. The scope was lubricated and inserted into the anus. The scope was then passed under direct visualization in a retrograde fashion to the base of the cecum. The light was seen in the patient's right lower quadrant. The ileocecal valve was photographed. There was a mucosal abnormality adjacent to the ileocecal valve with hyperemia of the mucosa which was biopsied with forceps. Biopsy site was hemostatic. The prep was 3/3/3. The appendiceal orifice was noted. There was no abnormality in this area.   Moderate pressure/positioning manuevers were required for complete passage. As the scope is withdrawn, visualization of the ascending and transverse colon was unremarkable. The scope is then pulled past the splenic flexure into the descending and sigmoid colon. No abnormalities were seen. The scope is then pulled through the sigmoid colon into the rectum. The scope is retroflexed at the anal verge. No abnormalities are seen at the anal verge. The scope was then straightened and removed. Total withdrawal time was 10 minutes.       The patient tolerated the procedure well        SPECIMENS:  Biopsy ileocecal valve    PLAN:    Resume high fiber diet  64oz water intake daily    Recommendation is for next colonoscopy per NCCN guidelines- 10 years      Fifi Escalera MD  8/9/2022  8:44 AM

## 2022-08-23 ENCOUNTER — OFFICE VISIT (OUTPATIENT)
Dept: SURGERY | Age: 57
End: 2022-08-23
Payer: COMMERCIAL

## 2022-08-23 ENCOUNTER — TELEPHONE (OUTPATIENT)
Dept: SURGERY | Age: 57
End: 2022-08-23

## 2022-08-23 VITALS
DIASTOLIC BLOOD PRESSURE: 77 MMHG | HEART RATE: 79 BPM | BODY MASS INDEX: 39.78 KG/M2 | WEIGHT: 310 LBS | TEMPERATURE: 97.3 F | SYSTOLIC BLOOD PRESSURE: 123 MMHG | HEIGHT: 74 IN

## 2022-08-23 DIAGNOSIS — K36 RECURRENT APPENDICITIS: Primary | ICD-10-CM

## 2022-08-23 PROCEDURE — G8427 DOCREV CUR MEDS BY ELIG CLIN: HCPCS | Performed by: SURGERY

## 2022-08-23 PROCEDURE — 3017F COLORECTAL CA SCREEN DOC REV: CPT | Performed by: SURGERY

## 2022-08-23 PROCEDURE — 1036F TOBACCO NON-USER: CPT | Performed by: SURGERY

## 2022-08-23 PROCEDURE — G8417 CALC BMI ABV UP PARAM F/U: HCPCS | Performed by: SURGERY

## 2022-08-23 PROCEDURE — 99212 OFFICE O/P EST SF 10 MIN: CPT | Performed by: SURGERY

## 2022-08-23 NOTE — TELEPHONE ENCOUNTER
Per the order of Dr. Adalid Diehl, patient has been scheduled for Laparoscopic appendectomy, possible ileocecectomy on 9. 1.2022. Patient provided with procedure information during office visit and scheduled for follow up appointment with Dr. Adalid Diehl. Patient instructed to please contact our office with any questions. Surgery form faxed to [address] [place] [address] and fax confirmation received. Dr. Adalid Diehl to enter orders. Patient is to take Dulcolax at 10am and 2pm and is to take a dose of Miralax with each Ducolax.   Electronically signed by Kwabena Shine on 8/23/22 at 11:22 AM EDT

## 2022-08-23 NOTE — PROGRESS NOTES
General Surgery History and Physical  [facility]    Patient's Name/Date of Birth: Hayley Duggan / 1965    Date: August 23, 2022     Surgeon: Rick Dawson MD    PCP: Jeff Foss MD     Chief Complaint: Recurrent appendicitis with abscess    HPI:   Hayley Duggan is a 64 y.o. male who presents for evaluation of recent admission for recurrent appendicitis with right lower quadrant abscess status post IR drain x2. The drain has had minimal output. He has finished his course of antibiotics however is yet to see the infectious disease doctor for follow-up. He denies any abdominal pain. He had colonoscopy which revealed no other significant abnormality. A flat polyp in the cecum near the ileocecal valve was biopsied and showed tubular adenoma. Patient Active Problem List   Diagnosis    Diabetic foot ulcer ([address]    Diabetes mellitus ([address]    Osteomyelitis of ankle or foot, left, acute ([address]    Cellulitis of foot    Diabetic arthropathy (HCC)    Gangrene of toe of left foot (HCC)    Morbid (severe) obesity due to excess calories (HCC)    Pure hypertriglyceridemia    Diabetic foot infection (HCC)    DVT prophylaxis    ISAIAS (acute kidney injury) ([address]    Intra-abdominal abscess (HCC)    Vitamin D deficiency    PINEDA (obstructive sleep apnea)    DKA, type 2, not at goal [facility])    Peritoneal abscess ([address]    Recurrent appendicitis       Past Medical History:   Diagnosis Date    Appendicitis with abscess     appendix not removed as of 5/25/22    Diabetes mellitus ([address]     Gangrene of toe of left foot ([address] 11/17/2017    Hyperlipidemia     Hyperlipidemia     Sleep apnea     Vitamin D deficiency        Past Surgical History:   Procedure Laterality Date    COLONOSCOPY N/A 8/8/2022    COLONOSCOPY WITH BIOPSY performed by Rick Dawson MD at [address] Left 11/17/2017    Amputation of left great toe.     TOE AMPUTATION      TOE AMPUTATION Left 10/21/2019    LEFT swings, palpitations or polydipsia/polyuria  Respiratory ROS: negative sputum changes, stridor, tachypnea or wheezing  Cardiovascular ROS: negative for - loss of consciousness, murmur or orthopnea  Gastrointestinal ROS: negative for - hematochezia or hematemesis  Genito-Urinary ROS: negative for -  genital discharge or hematuria  Musculoskeletal ROS: negative for - focal weakness, gangrene  Psych/Neuro ROS: negative for - visual or auditory hallucinations, suicidal ideation    Physical exam:   /77   Pulse 79   Temp 97.3 °F (36.3 °C) (Temporal)   Ht 6' 2\" (1.88 m)   Wt (!) 310 lb (140.6 kg)   BMI 39.80 kg/m²   General appearance:  NAD, appears stated age  Head: NCAT, PERRLA, EOMI, red conjunctiva  Neck: supple, no masses, trachea midline  Lungs: Equal chest rise bilateral, no retractions, no wheezing  Heart: Reg rate  Abdomen: soft, nontender, nondistended. Right lower quadrant drain with serous output.   Drain removed  Skin; warm and dry, no cyanosis  Gu: no cva tenderness  Extremities: atraumatic, no focal motor deficits, no open wounds  Psych: No tremor, visual hallucinations      Radiology: N/A    Assessment:  Sweta Montaño is a 64 y.o. male with recurrent appendicitis with right lower quadrant abscess status post IR drain  Patient Active Problem List   Diagnosis    Diabetic foot ulcer ([address]    Diabetes mellitus ([address]    Osteomyelitis of ankle or foot, left, acute ([address]    Cellulitis of foot    Diabetic arthropathy (HCC)    Gangrene of toe of left foot ([address]    Morbid (severe) obesity due to excess calories (HCC)    Pure hypertriglyceridemia    Diabetic foot infection (HCC)    DVT prophylaxis    ISAIAS (acute kidney injury) ([address]    Intra-abdominal abscess (HCC)    Vitamin D deficiency    PINEDA (obstructive sleep apnea)    DKA, type 2, not at goal [facility])    Peritoneal abscess ([address]    Recurrent appendicitis         Plan:  He will be scheduled for laparoscopic interval appendectomy  Patient understands

## 2022-08-23 NOTE — TELEPHONE ENCOUNTER
Prior Authorization Form:      DEMOGRAPHICS:                     Patient Name:  Calvin Riddle  Patient :  1965            Insurance:  Payor: Yusef Burnham / Plan: Damiana Sink / Product Type: *No Product type* /   Insurance ID Number:    Payer/Plan Subscr  Sex Relation Sub.  Ins. ID Effective Group Num   1. DOMI Finch Neigh 1965 Male Self [id number] [address] [facility] [address]         DIAGNOSIS & PROCEDURE:                       Procedure/Operation: Laparoscopic appendectomy possible ileocecectomy           CPT Code: 13051    Diagnosis:  Recurrent appendicitis    ICD10 Code: k36    Location:  [address] [place] [address]    Surgeon:  Manny Chavarria INFORMATION:                          Date: 2022    Time: TBD              Anesthesia:  General                                                       Status:  Outpatient        Special Comments:         Electronically signed by Gale Miguel on 2022 at 11:22 AM

## 2022-08-26 RX ORDER — SODIUM CHLORIDE 9 MG/ML
INJECTION, SOLUTION INTRAVENOUS CONTINUOUS
Status: CANCELLED | OUTPATIENT
Start: 2022-08-26

## 2022-08-29 ENCOUNTER — HOSPITAL ENCOUNTER (OUTPATIENT)
Dept: PREADMISSION TESTING | Age: 57
Discharge: HOME | DRG: 233 | End: 2022-08-29
Payer: COMMERCIAL

## 2022-08-29 VITALS
RESPIRATION RATE: 18 BRPM | HEART RATE: 89 BPM | OXYGEN SATURATION: 96 % | BODY MASS INDEX: 40.28 KG/M2 | WEIGHT: 313.9 LBS | DIASTOLIC BLOOD PRESSURE: 69 MMHG | TEMPERATURE: 97.4 F | HEIGHT: 74 IN | SYSTOLIC BLOOD PRESSURE: 118 MMHG

## 2022-08-29 DIAGNOSIS — Z01.818 PRE-OP TESTING: Primary | ICD-10-CM

## 2022-08-29 LAB
ANION GAP SERPL CALCULATED.3IONS-SCNC: 9 MMOL/L (ref 7–16)
BUN BLDV-MCNC: 10 MG/DL (ref 6–20)
BUN BLDV-MCNC: NORMAL MG/DL
CALCIUM SERPL-MCNC: 9.1 MG/DL (ref 8.6–10.2)
CALCIUM SERPL-MCNC: NORMAL MG/DL
CHLORIDE BLD-SCNC: 97 MMOL/L (ref 98–107)
CHLORIDE BLD-SCNC: NORMAL MMOL/L
CO2: 25 MMOL/L (ref 22–29)
CO2: NORMAL
CREAT SERPL-MCNC: 0.8 MG/DL (ref 0.7–1.2)
CREAT SERPL-MCNC: NORMAL MG/DL
GFR AFRICAN AMERICAN: >60
GFR CALCULATED: NORMAL
GFR NON-AFRICAN AMERICAN: >60 ML/MIN/1.73
GLUCOSE BLD-MCNC: 372 MG/DL (ref 74–99)
GLUCOSE BLD-MCNC: NORMAL MG/DL
HCT VFR BLD CALC: 41 % (ref 37–54)
HEMOGLOBIN: 13.6 G/DL (ref 12.5–16.5)
MCH RBC QN AUTO: 29.5 PG (ref 26–35)
MCHC RBC AUTO-ENTMCNC: 33.2 % (ref 32–34.5)
MCV RBC AUTO: 88.9 FL (ref 80–99.9)
PDW BLD-RTO: 13.1 FL (ref 11.5–15)
PLATELET # BLD: 255 E9/L (ref 130–450)
PMV BLD AUTO: 9.9 FL (ref 7–12)
POTASSIUM REFLEX MAGNESIUM: 4.2 MMOL/L (ref 3.5–5)
POTASSIUM SERPL-SCNC: NORMAL MMOL/L
RBC # BLD: 4.61 E12/L (ref 3.8–5.8)
SODIUM BLD-SCNC: 131 MMOL/L (ref 132–146)
SODIUM BLD-SCNC: NORMAL MMOL/L
WBC # BLD: 6.3 E9/L (ref 4.5–11.5)

## 2022-08-29 PROCEDURE — 87081 CULTURE SCREEN ONLY: CPT

## 2022-08-29 PROCEDURE — 85027 COMPLETE CBC AUTOMATED: CPT

## 2022-08-29 PROCEDURE — 80048 BASIC METABOLIC PNL TOTAL CA: CPT

## 2022-08-29 PROCEDURE — 36415 COLL VENOUS BLD VENIPUNCTURE: CPT

## 2022-08-29 NOTE — PROGRESS NOTES
[address] [facility]                                                                                                                    PRE OP INSTRUCTIONS FOR  Mirza Carter        Date: 8/29/2022  Date of surgery: 9/1/22   Arrival Time: Hospital will call you between 5pm and 7pm with your final arrival time for surgery    Do not eat or drink anything after midnight prior to surgery. This includes no water, chewing gum, mints or ice chips. Take the following medications with a small sip of water on the morning of Surgery: none     Diabetics may take evening dose of insulin but none after midnight. If you feel symptomatic or low blood sugar morning of surgery drink 1-2 ounces of apple juice only. Aspirin, Ibuprofen, Advil, Naproxen, Vitamin E and other Anti-inflammatory products should be stopped  before surgery  as directed by your physician. Take Tylenol only unless instructed otherwise by your surgeon. Do not smoke,use illicit drugs and do not drink any alcoholic beverages 24 hours prior to surgery. You may brush your teeth the morning of surgery. DO NOT SWALLOW WATER    You MUST make arrangements for a responsible adult to take you home after your surgery. You will not be allowed to leave alone or drive yourself home. It is strongly suggested someone stay with you the first 24 hrs. Your surgery will be cancelled if you do not have a ride home. Please wear simple, loose fitting clothing to the hospital.  Lise Britoel not bring valuables (money, credit cards, checkbooks, etc.) Do not wear any makeup (including no eye makeup) or nail polish on your fingers or toes. DO NOT wear any jewelry or piercings on day of surgery. All body piercing jewelry must be removed.     Shower the night before surgery with __x_Antibacterial soap /SAL WIPES__x______      Notify your Surgeon if you develop any illness between now and surgery time, cough, cold, fever, sore throat, nausea, vomiting, etc. Please notify your surgeon if you experience dizziness, shortness of breath or blurred vision between now & the time of your surgery. If you have ___dentures, they will be removed before going to the OR; we will provide you a container. If you wear ___contact lenses or _x__glasses, they will be removed; please bring a case for them. To provide excellent care visitors will be limited to 1  in the room at any given time. Sleep apnea patients need to bring  SETTINGS to hospital on day of surgery. During flu season no children under the age of 15 are permitted in the hospital for the safety of all patients. Other come in main lobby and stop at                  Please call AMBULATORY CARE if you have any further questions.    [address] [facility]     [address]

## 2022-08-29 NOTE — PROGRESS NOTES
Spoke with Dr Kalani Burton re glucose level. Left messages on both pt's and his wife's cell phone for pt to closely monitor glucose,diet and medication. Informed that surgery may be cancelled if glucose over 300. Called Dr Genie Sampson office and faxed results. Dr Africa Sparks is OOT till Sept 16th.   Ge Bella RN  8/29/2022  2:07 PM

## 2022-08-31 ENCOUNTER — ANESTHESIA EVENT (OUTPATIENT)
Dept: OPERATING ROOM | Age: 57
DRG: 233 | End: 2022-08-31
Payer: COMMERCIAL

## 2022-08-31 LAB — MRSA CULTURE ONLY: NORMAL

## 2022-09-01 ENCOUNTER — HOSPITAL ENCOUNTER (INPATIENT)
Age: 57
LOS: 1 days | Discharge: HOME | DRG: 233 | End: 2022-09-01
Attending: SURGERY | Admitting: SURGERY
Payer: COMMERCIAL

## 2022-09-01 ENCOUNTER — ANESTHESIA (OUTPATIENT)
Dept: OPERATING ROOM | Age: 57
DRG: 233 | End: 2022-09-01
Payer: COMMERCIAL

## 2022-09-01 VITALS
RESPIRATION RATE: 18 BRPM | WEIGHT: 306.2 LBS | HEART RATE: 82 BPM | DIASTOLIC BLOOD PRESSURE: 69 MMHG | HEIGHT: 74 IN | SYSTOLIC BLOOD PRESSURE: 120 MMHG | OXYGEN SATURATION: 97 % | BODY MASS INDEX: 39.3 KG/M2 | TEMPERATURE: 97.3 F

## 2022-09-01 DIAGNOSIS — K36 RECURRENT APPENDICITIS: Primary | ICD-10-CM

## 2022-09-01 DIAGNOSIS — K36 CHRONIC APPENDICITIS: ICD-10-CM

## 2022-09-01 LAB
METER GLUCOSE: 103 MG/DL (ref 74–99)
METER GLUCOSE: 94 MG/DL (ref 74–99)

## 2022-09-01 PROCEDURE — 6360000002 HC RX W HCPCS: Performed by: NURSE ANESTHETIST, CERTIFIED REGISTERED

## 2022-09-01 PROCEDURE — 7100000001 HC PACU RECOVERY - ADDTL 15 MIN: Performed by: SURGERY

## 2022-09-01 PROCEDURE — 7100000010 HC PHASE II RECOVERY - FIRST 15 MIN: Performed by: SURGERY

## 2022-09-01 PROCEDURE — 3700000001 HC ADD 15 MINUTES (ANESTHESIA): Performed by: SURGERY

## 2022-09-01 PROCEDURE — 2580000003 HC RX 258: Performed by: SURGERY

## 2022-09-01 PROCEDURE — 6360000002 HC RX W HCPCS

## 2022-09-01 PROCEDURE — 2580000003 HC RX 258: Performed by: NURSE ANESTHETIST, CERTIFIED REGISTERED

## 2022-09-01 PROCEDURE — 6360000002 HC RX W HCPCS: Performed by: SURGERY

## 2022-09-01 PROCEDURE — 3600000013 HC SURGERY LEVEL 3 ADDTL 15MIN: Performed by: SURGERY

## 2022-09-01 PROCEDURE — 2500000003 HC RX 250 WO HCPCS: Performed by: NURSE ANESTHETIST, CERTIFIED REGISTERED

## 2022-09-01 PROCEDURE — 1200000000 HC SEMI PRIVATE

## 2022-09-01 PROCEDURE — 2580000003 HC RX 258: Performed by: ANESTHESIOLOGY

## 2022-09-01 PROCEDURE — 82962 GLUCOSE BLOOD TEST: CPT

## 2022-09-01 PROCEDURE — 2709999900 HC NON-CHARGEABLE SUPPLY: Performed by: SURGERY

## 2022-09-01 PROCEDURE — 7100000000 HC PACU RECOVERY - FIRST 15 MIN: Performed by: SURGERY

## 2022-09-01 PROCEDURE — 88304 TISSUE EXAM BY PATHOLOGIST: CPT

## 2022-09-01 PROCEDURE — 2500000003 HC RX 250 WO HCPCS: Performed by: SURGERY

## 2022-09-01 PROCEDURE — 0DTJ4ZZ RESECTION OF APPENDIX, PERCUTANEOUS ENDOSCOPIC APPROACH: ICD-10-PCS | Performed by: SURGERY

## 2022-09-01 PROCEDURE — 2720000010 HC SURG SUPPLY STERILE: Performed by: SURGERY

## 2022-09-01 PROCEDURE — 3600000003 HC SURGERY LEVEL 3 BASE: Performed by: SURGERY

## 2022-09-01 PROCEDURE — 7100000011 HC PHASE II RECOVERY - ADDTL 15 MIN: Performed by: SURGERY

## 2022-09-01 PROCEDURE — 44970 LAPAROSCOPY APPENDECTOMY: CPT | Performed by: SURGERY

## 2022-09-01 PROCEDURE — 3700000000 HC ANESTHESIA ATTENDED CARE: Performed by: SURGERY

## 2022-09-01 RX ORDER — IPRATROPIUM BROMIDE AND ALBUTEROL SULFATE 2.5; .5 MG/3ML; MG/3ML
1 SOLUTION RESPIRATORY (INHALATION)
Status: DISCONTINUED | OUTPATIENT
Start: 2022-09-01 | End: 2022-09-01 | Stop reason: HOSPADM

## 2022-09-01 RX ORDER — SODIUM CHLORIDE 0.9 % (FLUSH) 0.9 %
5-40 SYRINGE (ML) INJECTION PRN
Status: DISCONTINUED | OUTPATIENT
Start: 2022-09-01 | End: 2022-09-01 | Stop reason: HOSPADM

## 2022-09-01 RX ORDER — MIDAZOLAM HYDROCHLORIDE 1 MG/ML
2 INJECTION INTRAMUSCULAR; INTRAVENOUS
Status: DISCONTINUED | OUTPATIENT
Start: 2022-09-01 | End: 2022-09-01 | Stop reason: HOSPADM

## 2022-09-01 RX ORDER — MIDAZOLAM HYDROCHLORIDE 1 MG/ML
INJECTION INTRAMUSCULAR; INTRAVENOUS PRN
Status: DISCONTINUED | OUTPATIENT
Start: 2022-09-01 | End: 2022-09-01 | Stop reason: SDUPTHER

## 2022-09-01 RX ORDER — SODIUM CHLORIDE 0.9 % (FLUSH) 0.9 %
5-40 SYRINGE (ML) INJECTION EVERY 12 HOURS SCHEDULED
Status: DISCONTINUED | OUTPATIENT
Start: 2022-09-01 | End: 2022-09-01 | Stop reason: HOSPADM

## 2022-09-01 RX ORDER — KETOROLAC TROMETHAMINE 30 MG/ML
30 INJECTION, SOLUTION INTRAMUSCULAR; INTRAVENOUS
Status: DISCONTINUED | OUTPATIENT
Start: 2022-09-01 | End: 2022-09-01 | Stop reason: HOSPADM

## 2022-09-01 RX ORDER — MORPHINE SULFATE 2 MG/ML
2 INJECTION, SOLUTION INTRAMUSCULAR; INTRAVENOUS EVERY 5 MIN PRN
Status: DISCONTINUED | OUTPATIENT
Start: 2022-09-01 | End: 2022-09-01 | Stop reason: HOSPADM

## 2022-09-01 RX ORDER — ONDANSETRON 2 MG/ML
4 INJECTION INTRAMUSCULAR; INTRAVENOUS
Status: DISCONTINUED | OUTPATIENT
Start: 2022-09-01 | End: 2022-09-01 | Stop reason: HOSPADM

## 2022-09-01 RX ORDER — MEPERIDINE HYDROCHLORIDE 25 MG/ML
INJECTION INTRAMUSCULAR; INTRAVENOUS; SUBCUTANEOUS
Status: COMPLETED
Start: 2022-09-01 | End: 2022-09-01

## 2022-09-01 RX ORDER — SODIUM CHLORIDE 9 MG/ML
INJECTION, SOLUTION INTRAVENOUS PRN
Status: DISCONTINUED | OUTPATIENT
Start: 2022-09-01 | End: 2022-09-01 | Stop reason: HOSPADM

## 2022-09-01 RX ORDER — BUPIVACAINE HYDROCHLORIDE 2.5 MG/ML
INJECTION, SOLUTION EPIDURAL; INFILTRATION; INTRACAUDAL PRN
Status: DISCONTINUED | OUTPATIENT
Start: 2022-09-01 | End: 2022-09-01 | Stop reason: ALTCHOICE

## 2022-09-01 RX ORDER — PROPOFOL 10 MG/ML
INJECTION, EMULSION INTRAVENOUS PRN
Status: DISCONTINUED | OUTPATIENT
Start: 2022-09-01 | End: 2022-09-01 | Stop reason: SDUPTHER

## 2022-09-01 RX ORDER — ROCURONIUM BROMIDE 10 MG/ML
INJECTION, SOLUTION INTRAVENOUS PRN
Status: DISCONTINUED | OUTPATIENT
Start: 2022-09-01 | End: 2022-09-01 | Stop reason: SDUPTHER

## 2022-09-01 RX ORDER — SODIUM CHLORIDE 9 MG/ML
INJECTION, SOLUTION INTRAVENOUS CONTINUOUS PRN
Status: DISCONTINUED | OUTPATIENT
Start: 2022-09-01 | End: 2022-09-01 | Stop reason: SDUPTHER

## 2022-09-01 RX ORDER — DEXAMETHASONE SODIUM PHOSPHATE 10 MG/ML
INJECTION, SOLUTION INTRAMUSCULAR; INTRAVENOUS PRN
Status: DISCONTINUED | OUTPATIENT
Start: 2022-09-01 | End: 2022-09-01 | Stop reason: SDUPTHER

## 2022-09-01 RX ORDER — METRONIDAZOLE 500 MG/100ML
500 INJECTION, SOLUTION INTRAVENOUS ONCE
Status: COMPLETED | OUTPATIENT
Start: 2022-09-01 | End: 2022-09-01

## 2022-09-01 RX ORDER — SODIUM CHLORIDE 9 MG/ML
INJECTION, SOLUTION INTRAVENOUS CONTINUOUS
Status: DISCONTINUED | OUTPATIENT
Start: 2022-09-01 | End: 2022-09-01 | Stop reason: HOSPADM

## 2022-09-01 RX ORDER — ONDANSETRON 2 MG/ML
INJECTION INTRAMUSCULAR; INTRAVENOUS PRN
Status: DISCONTINUED | OUTPATIENT
Start: 2022-09-01 | End: 2022-09-01 | Stop reason: SDUPTHER

## 2022-09-01 RX ORDER — LIDOCAINE HYDROCHLORIDE 20 MG/ML
INJECTION, SOLUTION EPIDURAL; INFILTRATION; INTRACAUDAL; PERINEURAL PRN
Status: DISCONTINUED | OUTPATIENT
Start: 2022-09-01 | End: 2022-09-01 | Stop reason: SDUPTHER

## 2022-09-01 RX ORDER — HYDROCODONE BITARTRATE AND ACETAMINOPHEN 5; 325 MG/1; MG/1
1 TABLET ORAL EVERY 6 HOURS PRN
Qty: 10 TABLET | Refills: 0 | Status: SHIPPED | OUTPATIENT
Start: 2022-09-01 | End: 2022-09-04

## 2022-09-01 RX ORDER — FENTANYL CITRATE 50 UG/ML
INJECTION, SOLUTION INTRAMUSCULAR; INTRAVENOUS PRN
Status: DISCONTINUED | OUTPATIENT
Start: 2022-09-01 | End: 2022-09-01 | Stop reason: SDUPTHER

## 2022-09-01 RX ORDER — DOCUSATE SODIUM 100 MG/1
100 CAPSULE, LIQUID FILLED ORAL 2 TIMES DAILY
Qty: 60 CAPSULE | Refills: 0 | Status: SHIPPED | OUTPATIENT
Start: 2022-09-01 | End: 2022-10-01

## 2022-09-01 RX ORDER — HYDRALAZINE HYDROCHLORIDE 20 MG/ML
10 INJECTION INTRAMUSCULAR; INTRAVENOUS
Status: DISCONTINUED | OUTPATIENT
Start: 2022-09-01 | End: 2022-09-01 | Stop reason: HOSPADM

## 2022-09-01 RX ORDER — PROCHLORPERAZINE EDISYLATE 5 MG/ML
5 INJECTION INTRAMUSCULAR; INTRAVENOUS
Status: DISCONTINUED | OUTPATIENT
Start: 2022-09-01 | End: 2022-09-01 | Stop reason: HOSPADM

## 2022-09-01 RX ORDER — DIPHENHYDRAMINE HYDROCHLORIDE 50 MG/ML
12.5 INJECTION INTRAMUSCULAR; INTRAVENOUS
Status: DISCONTINUED | OUTPATIENT
Start: 2022-09-01 | End: 2022-09-01 | Stop reason: HOSPADM

## 2022-09-01 RX ORDER — LABETALOL HYDROCHLORIDE 5 MG/ML
10 INJECTION, SOLUTION INTRAVENOUS
Status: DISCONTINUED | OUTPATIENT
Start: 2022-09-01 | End: 2022-09-01 | Stop reason: HOSPADM

## 2022-09-01 RX ORDER — MEPERIDINE HYDROCHLORIDE 25 MG/ML
12.5 INJECTION INTRAMUSCULAR; INTRAVENOUS; SUBCUTANEOUS EVERY 5 MIN PRN
Status: DISCONTINUED | OUTPATIENT
Start: 2022-09-01 | End: 2022-09-01 | Stop reason: HOSPADM

## 2022-09-01 RX ADMIN — METRONIDAZOLE 500 MG: 500 INJECTION, SOLUTION INTRAVENOUS at 07:40

## 2022-09-01 RX ADMIN — ROCURONIUM BROMIDE 50 MG: 10 INJECTION, SOLUTION INTRAVENOUS at 07:37

## 2022-09-01 RX ADMIN — PROPOFOL 200 MG: 10 INJECTION, EMULSION INTRAVENOUS at 07:36

## 2022-09-01 RX ADMIN — MIDAZOLAM 2 MG: 1 INJECTION INTRAMUSCULAR; INTRAVENOUS at 07:31

## 2022-09-01 RX ADMIN — Medication 100 MG: at 07:36

## 2022-09-01 RX ADMIN — SUGAMMADEX 300 MG: 100 INJECTION, SOLUTION INTRAVENOUS at 08:07

## 2022-09-01 RX ADMIN — FENTANYL CITRATE 100 MCG: 50 INJECTION, SOLUTION INTRAMUSCULAR; INTRAVENOUS at 07:36

## 2022-09-01 RX ADMIN — SODIUM CHLORIDE: 900 INJECTION, SOLUTION INTRAVENOUS at 07:31

## 2022-09-01 RX ADMIN — MEPERIDINE HYDROCHLORIDE 12.5 MG: 25 INJECTION, SOLUTION INTRAMUSCULAR; INTRAVENOUS; SUBCUTANEOUS at 08:57

## 2022-09-01 RX ADMIN — DEXAMETHASONE SODIUM PHOSPHATE 10 MG: 10 INJECTION INTRAMUSCULAR; INTRAVENOUS at 08:05

## 2022-09-01 RX ADMIN — SODIUM CHLORIDE: 9 INJECTION, SOLUTION INTRAVENOUS at 07:08

## 2022-09-01 RX ADMIN — ONDANSETRON 4 MG: 2 INJECTION INTRAMUSCULAR; INTRAVENOUS at 08:05

## 2022-09-01 RX ADMIN — MEPERIDINE HYDROCHLORIDE 12.5 MG: 25 INJECTION INTRAMUSCULAR; INTRAVENOUS; SUBCUTANEOUS at 08:57

## 2022-09-01 RX ADMIN — CEFAZOLIN SODIUM 3000 MG: 10 INJECTION, POWDER, FOR SOLUTION INTRAVENOUS at 07:31

## 2022-09-01 ASSESSMENT — PAIN SCALES - GENERAL
PAINLEVEL_OUTOF10: 3
PAINLEVEL_OUTOF10: 0
PAINLEVEL_OUTOF10: 3
PAINLEVEL_OUTOF10: 4

## 2022-09-01 ASSESSMENT — PAIN DESCRIPTION - LOCATION
LOCATION: ABDOMEN

## 2022-09-01 ASSESSMENT — PAIN DESCRIPTION - PAIN TYPE
TYPE: SURGICAL PAIN
TYPE: SURGICAL PAIN

## 2022-09-01 ASSESSMENT — LIFESTYLE VARIABLES: SMOKING_STATUS: 0

## 2022-09-01 ASSESSMENT — PAIN DESCRIPTION - DESCRIPTORS: DESCRIPTORS: DISCOMFORT

## 2022-09-01 NOTE — ANESTHESIA PRE PROCEDURE
Department of Anesthesiology  Preprocedure Note       Name:  Mil Chen   Age:  64 y.o.  :  1965                                          MRN:  [id number]         Date:  2022      Surgeon: Lea Holt):  Kellie Hamilton MD    Procedure: LAPAROSCOPIC APPENDECTOMY POSSIBLE ILEOCECECTOMY    Medications prior to admission:   Prior to Admission medications    Medication Sig Start Date End Date Taking? Authorizing Provider   insulin glargine (LANTUS SOLOSTAR) 100 UNIT/ML injection pen Inject 35 Units into the skin nightly 22   Sharon Huggins MD   insulin lispro (HUMALOG) 100 UNIT/ML pen Inject 15 Units into the skin 3 times daily (before meals)  Patient taking differently: Inject 15 Units into the skin 3 times daily (before meals) Now on sliding scale, before meals 22   Sharon Huggins MD   lisinopril (PRINIVIL;ZESTRIL) 5 MG tablet Take 1 tablet by mouth daily 22   Betito Harvey MD   loratadine (CLARITIN) 10 MG tablet Take 1 tablet by mouth daily 22   Betito Harvey MD   empagliflozin (JARDIANCE) 10 MG tablet Take 1 tablet by mouth daily  Patient taking differently: Take 1 tablet by mouth daily Not currently taking 8/29/22 3/31/22   Betito Harvey MD   atorvastatin (LIPITOR) 40 MG tablet Take 1 tablet by mouth daily 3/31/22   Betito Peng MD   glucose monitoring (FREESTYLE FREEDOM) kit 1 kit by Does not apply route 4 times daily as needed (monitoring blood sugars) 3/31/22   Betito Harvey MD   FreeStyle Lancets MISC 1 each by Does not apply route 4 times daily as needed (monitor blood sugar) 3/31/22 6/29/22  Betito Harvey MD   blood glucose test strips (FREESTYLE LITE) strip 1 each by In Vitro route daily As needed. 3/31/22   Betito Harvey MD   Blood Glucose Monitoring Suppl (ONE TOUCH ULTRA 2) w/Device KIT 1 kit by Does not apply route 4 times daily as needed (monitoring blood sugar) Check blood sugar 4 times daily and prn.  Please provide diabetic supplies for use with this monitor 3/31/22   Betito Vallejo MD   blood glucose test strips (ONETOUCH ULTRA) strip 1 each by In Vitro route 4 times daily as needed (monitor blood sugar with insulin use) As needed. 3/31/22   Betito Vallejo MD   ONE TOUCH ULTRASOFT LANCETS MISC 1 each by Does not apply route 4 times daily as needed (monitor blood sugar with insulin use) 3/31/22   Betito Harvey MD   acetaminophen (TYLENOL) 500 MG tablet Take 2 tablets by mouth every 8 hours as needed for Pain 11/21/17   Mayela Boyer MD   Blood Glucose Monitoring Suppl (D-CARE GLUCOMETER) w/Device KIT 1 each by Does not apply route 4 times daily (with meals and nightly) 11/21/17   Mayela Boyer MD   Insulin Pen Needle 32G X 4 MM MISC 1 each by Does not apply route 4 times daily (before meals and nightly) 11/21/17   Mayela Boyer MD       Current medications:    No current facility-administered medications for this visit. No current outpatient medications on file.      Facility-Administered Medications Ordered in Other Visits   Medication Dose Route Frequency Provider Last Rate Last Admin    sodium chloride flush 0.9 % injection 5-40 mL  5-40 mL IntraVENous 2 times per day Anna Harden MD        sodium chloride flush 0.9 % injection 5-40 mL  5-40 mL IntraVENous PRN Anna Harden MD        0.9 % sodium chloride infusion   IntraVENous PRN Anna Harden MD        ceFAZolin (ANCEF) 3,000 mg in dextrose 5 % 100 mL IVPB  3,000 mg IntraVENous On Call to Lalit Francis MD        metronidazole (FLAGYL) 500 mg in 0.9% NaCl 100 mL IVPB premix  500 mg IntraVENous Once Anna Harden MD        0.9 % sodium chloride infusion   IntraVENous Continuous Harpreet Gutierrez MD           Allergies:  No Known Allergies    Problem List:    Patient Active Problem List   Diagnosis Code    Diabetic foot ulcer ([facility] E11.621, L97.509    Diabetes mellitus ([facility] E11.9    Osteomyelitis of ankle or foot, left, acute ([facility] M86.172    Cellulitis of foot L03.119    Diabetic arthropathy (HCC) E11.618    Gangrene of toe of left foot (HCC) I96    Morbid (severe) obesity due to excess calories (HCC) E66.01    Pure hypertriglyceridemia E78.1    Diabetic foot infection (HCC) E11.628, L08.9    DVT prophylaxis Z29.9    ISAIAS (acute kidney injury) ([facility] N17.9    Intra-abdominal abscess (HCC) K65.1    Vitamin D deficiency E55.9    PINEDA (obstructive sleep apnea) G47.33    DKA, type 2, not at goal [facility]) E11.10    Peritoneal abscess ([facility] K65.1    Recurrent appendicitis K36       Past Medical History:        Diagnosis Date    Appendicitis with abscess     appendix not removed as of 5/25/22    Diabetes mellitus ([facility]     Gangrene of toe of left foot ([facility] 11/17/2017    Hyperlipidemia     Hyperlipidemia     Sleep apnea     cpap  not sure of setting    Vitamin D deficiency        Past Surgical History:        Procedure Laterality Date    COLONOSCOPY N/A 8/8/2022    COLONOSCOPY WITH BIOPSY performed by Katharine Ruth MD at [facility] Left 11/17/2017    Amputation of left great toe.  TOE AMPUTATION      TOE AMPUTATION Left 10/21/2019    LEFT SECOND TOE AMPUTATION performed by Nathan Duran DPM at [address] [place] [address] Road History:    Social History     Tobacco Use    Smoking status: Never    Smokeless tobacco: Never   Substance Use Topics    Alcohol use: No                                Counseling given: Not Answered      Vital Signs (Current): There were no vitals filed for this visit.                                            BP Readings from Last 3 Encounters:   09/01/22 133/77   08/29/22 118/69   08/23/22 123/77       NPO Status:  > 12 hours                                                                                BMI:   Wt Readings from Last 3 Encounters:   09/01/22 (!) 306 lb 3.2 oz (138.9 kg)   08/29/22 (!) 313 lb 14.4 oz (142.4 kg)   08/23/22 (!) 310 lb (140.6 kg)     There is no height or weight on file to calculate BMI.    CBC:   Lab Results   Component Value Date/Time    WBC 6.3 08/29/2022 12:50 PM    RBC 4.61 08/29/2022 12:50 PM    HGB 13.6 08/29/2022 12:50 PM    HCT 41.0 08/29/2022 12:50 PM    MCV 88.9 08/29/2022 12:50 PM    RDW 13.1 08/29/2022 12:50 PM     08/29/2022 12:50 PM       CMP:   Lab Results   Component Value Date/Time     08/29/2022 12:50 PM    K 4.2 08/29/2022 12:50 PM    CL 97 08/29/2022 12:50 PM    CO2 25 08/29/2022 12:50 PM    BUN 10 08/29/2022 12:50 PM    CREATININE 0.8 08/29/2022 12:50 PM    GFRAA >60 08/29/2022 12:50 PM    LABGLOM >60 08/29/2022 12:50 PM    GLUCOSE 372 08/29/2022 12:50 PM    GLUCOSE 191 04/01/2011 11:50 AM    PROT 7.1 06/20/2022 09:30 AM    CALCIUM 9.1 08/29/2022 12:50 PM    BILITOT 0.3 06/20/2022 09:30 AM    ALKPHOS 144 06/20/2022 09:30 AM    AST 16 06/20/2022 09:30 AM    ALT 16 06/20/2022 09:30 AM       POC Tests: No results for input(s): POCGLU, POCNA, POCK, POCCL, POCBUN, POCHEMO, POCHCT in the last 72 hours.     Coags:   Lab Results   Component Value Date/Time    PROTIME 13.1 05/25/2022 09:39 PM    INR 1.2 05/25/2022 09:39 PM    APTT 25.7 03/06/2022 08:56 AM       HCG (If Applicable): No results found for: PREGTESTUR, PREGSERUM, HCG, HCGQUANT     ABGs: No results found for: PHART, PO2ART, JDX9LDI, HVK5EOH, BEART, M6GKGKKR     Type & Screen (If Applicable):  No results found for: LABABO, LABRH     EKG 10/18/2019  Component Value Ref Range & Units Status Collected Lab   Ventricular Rate 95  BPM Final 10/18/2019 12:59 PM [facility]APM   Atrial Rate 95  BPM Final 10/18/2019 12:59 PM [facility]   P-R Interval 208  ms Final 10/18/2019 12:59 PM [facility]   QRS Duration 78  ms Final 10/18/2019 12:59 PM [facility]   Q-T Interval 348  ms Final 10/18/2019 12:59 PM [facility]   QTc Calculation (Bazett) 437  ms Final 10/18/2019 12:59 PM [facility]   P [place] 42  degrees Final 10/18/2019 12:59 PM [facility]   R [place] 3  degrees Final 10/18/2019 12:59 PM [facility]   T Axis 45  degrees Final 10/18/2019 12:59 PM [facility]   Testing Performed By     Lab - Abbreviation Name Director Address Valid Date Range   360-[facility]PEAPM [facility] MUSE Unknown Unknown 04/18/16 0721-Present   Narrative   Performed by: [facility]   Normal sinus rhythm  Normal ECG         Anesthesia Evaluation  Patient summary reviewed no history of anesthetic complications:   Airway: Mallampati: IV  TM distance: <3 FB   Neck ROM: full  Mouth opening: < 3 FB   Dental:          Pulmonary:   (+) sleep apnea: on CPAP,  decreased breath sounds: bilateral     (-) not a current smoker                          ROS comment: Seasonal allergies   Cardiovascular:Negative CV ROS  Exercise tolerance: good (>4 METS),   (+) hypertension: moderate, hyperlipidemia    (-) past MI, CAD, CABG/stent, dysrhythmias and  angina    ECG reviewed  Rhythm: regular  Rate: normal           Beta Blocker:  Not on Beta Blocker         Neuro/Psych:   Negative Neuro/Psych ROS     (-) seizures, TIA and CVA            ROS comment: Polyneuropathy  GI/Hepatic/Renal:   (+) renal disease: ARF, morbid obesity     (-) hepatitis      ROS comment: History of peritoneal abscess. Endo/Other:    (+) DiabetesType II DM, poorly controlled, using insulin, .    (-) blood dyscrasia        Pt had no PAT visit        ROS comment: Gangrene of toe- left   + Osteomyelitis   Hx of prior toe amputation  Abdominal:   (+) obese,           Vascular: negative vascular ROS.  + PVD, aortic or cerebral, .  - DVT and PE. Other Findings:             Anesthesia Plan      general     ASA 3     (  )  Induction: intravenous. MIPS: Postoperative opioids intended and Prophylactic antiemetics administered. Anesthetic plan and risks discussed with patient. Plan discussed with CRNA. DOS STAFF ADDENDUM:    Pt seen and examined, chart reviewed (including anesthesia, drug and allergy history). Anesthetic plan, risks, benefits, alternatives, and personnel involved discussed with patient. Patient verbalized an understanding and agrees to proceed. Plan discussed with care team members and agreed upon. Magui Cruz MD  Staff Anesthesiologist  7:05 AM    Magui Cruz MD   9/1/2022    Patient seen and evaluated, anesthesia plan of care reviewed.    Heather Nunez, SCARLETT - CRNA

## 2022-09-01 NOTE — ANESTHESIA POSTPROCEDURE EVALUATION
Department of Anesthesiology  Postprocedure Note    Patient: Lindy Pacheco  MRN: [id number]  YOB: 1965  Date of evaluation: 9/1/2022      Procedure Summary     Date: 09/01/22 Room / Location: [address] [place] [address] 03 / [address] [facility]    Anesthesia Start: 5003 Anesthesia Stop: 5245    Procedure: LAPAROSCOPIC APPENDECTOMY (Abdomen) Diagnosis:       Chronic appendicitis      (Chronic appendicitis Georgina Tyson)    Surgeons: Boy Palacios MD Responsible Provider: Leigha Pickett MD    Anesthesia Type: general ASA Status: 3          Anesthesia Type: No value filed.     Janee Phase I: Janee Score: 9    Janee Phase II: Janee Score: 10      Anesthesia Post Evaluation    Patient location during evaluation: PACU  Patient participation: complete - patient participated  Level of consciousness: awake  Airway patency: patent  Nausea & Vomiting: no nausea and no vomiting  Complications: no  Cardiovascular status: hemodynamically stable  Respiratory status: acceptable  Hydration status: euvolemic

## 2022-09-01 NOTE — H&P
General Surgery History and Physical  [facility]    Patient's Name/Date of Birth: Meme Taveras / 1965    Date: September 1, 2022     Surgeon: Cristal Garcia MD    PCP: Sherly Tony MD     Chief Complaint: Recurrent appendicitis with abscess    HPI:   Meme Taveras is a 64 y.o. male who presents for evaluation of recent admission for recurrent appendicitis with right lower quadrant abscess status post IR drain x2. The drain has had minimal output. He has finished his course of antibiotics however is yet to see the infectious disease doctor for follow-up. He denies any abdominal pain. He had colonoscopy which revealed no other significant abnormality. A flat polyp in the cecum near the ileocecal valve was biopsied and showed tubular adenoma. Patient Active Problem List   Diagnosis    Diabetic foot ulcer ([address]    Diabetes mellitus ([address]    Osteomyelitis of ankle or foot, left, acute ([address]    Cellulitis of foot    Diabetic arthropathy (HCC)    Gangrene of toe of left foot (HCC)    Morbid (severe) obesity due to excess calories (HCC)    Pure hypertriglyceridemia    Diabetic foot infection (HCC)    DVT prophylaxis    ISAIAS (acute kidney injury) ([address]    Intra-abdominal abscess (HCC)    Vitamin D deficiency    PINEDA (obstructive sleep apnea)    DKA, type 2, not at goal [facility])    Peritoneal abscess ([address]    Recurrent appendicitis       Past Medical History:   Diagnosis Date    Appendicitis with abscess     appendix not removed as of 5/25/22    Diabetes mellitus ([address]     Gangrene of toe of left foot ([address] 11/17/2017    Hyperlipidemia     Hyperlipidemia     Sleep apnea     cpap  not sure of setting    Vitamin D deficiency        Past Surgical History:   Procedure Laterality Date    COLONOSCOPY N/A 8/8/2022    COLONOSCOPY WITH BIOPSY performed by Cristal aGrcia MD at [address] [facility] [address] Left 11/17/2017    Amputation of left great toe.     TOE AMPUTATION      TOE bruising  Endocrine ROS: negative  lethargy, mood swings, palpitations or polydipsia/polyuria  Respiratory ROS: negative sputum changes, stridor, tachypnea or wheezing  Cardiovascular ROS: negative for - loss of consciousness, murmur or orthopnea  Gastrointestinal ROS: negative for - hematochezia or hematemesis  Genito-Urinary ROS: negative for -  genital discharge or hematuria  Musculoskeletal ROS: negative for - focal weakness, gangrene  Psych/Neuro ROS: negative for - visual or auditory hallucinations, suicidal ideation    Physical exam:   /77   Pulse 92   Temp 97.9 °F (36.6 °C) (Infrared)   Resp 16   Ht 6' 2\" (1.88 m)   Wt (!) 306 lb 3.2 oz (138.9 kg)   SpO2 98%   BMI 39.31 kg/m²   General appearance:  NAD, appears stated age  Head: NCAT, PERRLA, EOMI, red conjunctiva  Neck: supple, no masses, trachea midline  Lungs: Equal chest rise bilateral, no retractions, no wheezing  Heart: Reg rate  Abdomen: soft, nontender, nondistended. Right lower quadrant drain with serous output.   Drain removed  Skin; warm and dry, no cyanosis  Gu: no cva tenderness  Extremities: atraumatic, no focal motor deficits, no open wounds  Psych: No tremor, visual hallucinations      Radiology: N/A    Assessment:  Mirza Carter is a 64 y.o. male with recurrent appendicitis with right lower quadrant abscess status post IR drain  Patient Active Problem List   Diagnosis    Diabetic foot ulcer ([address]    Diabetes mellitus ([address]    Osteomyelitis of ankle or foot, left, acute ([address]    Cellulitis of foot    Diabetic arthropathy (HCC)    Gangrene of toe of left foot (HCC)    Morbid (severe) obesity due to excess calories (HCC)    Pure hypertriglyceridemia    Diabetic foot infection (HCC)    DVT prophylaxis    ISAIAS (acute kidney injury) ([address]    Intra-abdominal abscess (HCC)    Vitamin D deficiency    PINEDA (obstructive sleep apnea)    DKA, type 2, not at goal [facility])    Peritoneal abscess ([address]    Recurrent appendicitis         Plan:  OR for laparoscopic interval appendectomy  Patient understands that due to his recurrent inflammation in that area, he may require an ileocecectomy  He understands and agrees with the risks, benefits and alternatives of the procedure discussed with them.         Lili Briggs MD  7:08 AM

## 2022-09-01 NOTE — OP NOTE
Operative Note  [facility]  9/1/2022    PREOPERATIVE DIAGNOSIS: Chronic recurrent appendicitis. POSTOPERATIVE DIAGNOSIS: Chronic recurrent appendicitis. OPERATION: Laparoscopic appendectomy. SURGEON: Irena Tyler MD    ASSISTANT: CATHY Matos     ANESTHESIA: General endotracheal.     ESTIMATED BLOOD LOSS: 2 mL. FLUIDS: Crystalloid. COMPLICATIONS: None. SPECIMEN: Appendix. DISPOSITION: To the floor. DESCRIPTION OF PROCEDURE: This is a 64 y.o. male who was admitted on 2 separate occasions with perforated appendicitis. He underwent IR drainage of right lower quadrant abscess followed by PICC line for IV antibiotics. After multiple episodes of appendicitis and long hospitalization, patient inquired about appendectomy. After extensive discussion, he consented for a laparoscopic appendectomy, possible ileocecectomy after being explained the risks, benefits, and alternatives which included scar formation, infection, bleeding, risk of open surgery. He agreed and willfully signed the consent. The patient was brought in the operating room theater, placed supine on the operating room table, administered general anesthesia, and intubated. The abdomen shaved with clippers, and then was prepped and draped in the normal sterile fashion. A 11-blade scalpel was used to make a 5mm incision in the superior aspect of the umbilicus. A Veress needle was then inserted through this incision and confirmed to be in place with saline drip test. We then connected it to CO2 and the abdomen was insufflated to 15 mmHg. The Veress needle was then removed and a 5 mm port was then inserted through the incision. A 5 mm camera was inserted, inspected the abdomen. There was inflammatory changes in the right lower quadrant. We then inserted a 10 mm port in the left lower quadrant and a 5 mm port just suprapubic.  Under direct visualization we then inserted atraumatic graspers into the abdomen through these 2 ports. Evaluation of the abdominal cavity did reveal an incarcerated umbilical hernia with omental fat at the level of the umbilicus. This was left untouched. Evaluation of the right lower quadrant of the abdomen revealed the cecum and visceral fat stuck to the right lower quadrant abdominal wall. Blunt dissection was used to separate the fat and cecum away from the right lower quadrant revealing an appendix with severe inflammatory chronic process at the tip of the appendix. Small bowel adhesions to the right lateral sidewall were also bluntly taken down. This revealed a chronically thickened appendix. We then used [place] graspers to bluntly dissect the base of the appendix between the mesoappendix and the appendix itself. We then used a white load on the 45mm endostapler to transect the mesoappendix. 2 45mm endostapler blue loads were then used to staple across the base of the appendix, and it was transected. Both of these were done without difficulty, and they were done under direct visualization and with laparoscopic camera. At this point we placed the appendix in a laparoscopic bag and it was retracted through the 10 mm port. We then inspected the base of the cecum and staple lines which appeared to be hemostatic. Minimal amount of suction was undertaken in the right lower quadrant which was confirmed to be hemostatic. The 10 mm trocar was removed. We then placed a single figure-of-8 interrupted 0 Vicryl suture in the 10 mm port site to reapproximate the fascia. We then withdrew our ports under direct visualization, inspected them, and they appeared to be hemostatic. We decompressed the abdomen  and then close the skin with 4-0 Monocryl suture in a simple interrupted fashion. All 3 port sites were then covered with surgical glue The patient was awoken in the operating room theater without any difficulty.      The patient was extubated without difficulty and transferred to the postoperative care unit. All instrument counts, lap counts, and needle counts were correct at the completion of the procedure.           Electronically signed by Luis Rowland MD  9/1/2022  8:36 AM

## 2022-09-01 NOTE — DISCHARGE INSTRUCTIONS
POST-OPERATIVE INSTRUCTIONS FOR APPENDECTOMY/BOWEL RESECTION  [facility]    Call the office at  as soon as possible to schedule your post-operative appointment with Dr. Jaimie Grijalva for two (2) weeks. You will have surgical glue closing your incisions, you may shower 24hours after your surgery but do not rub off glue, it will come off on its own over time. Do not tub bathe, swim or hot tub for 3 days after your surgery, shower only and pat incisions dry after shower. General guidelines for activity:   Avoid strenuous activity or lifting anything heavier than 15 pounds for 4 weeks. It is OK to be up  walking around, and walking up and down stairs. Do what is comfortable: stop and rest when you feel tired. Drink plenty of fluids and stay on a bland diet for 2-3 days after surgery. Do NOT drive for one week and while taking your narcotic pain medicine. Watch for signs of infection:  Excessive warmth or bright redness around your incisions  Leakage of bloody or cloudy fluid from you incisions  Fever over 100.5    During the laparoscopic procedure that you had, gas is pumped into the abdominal cavity. You may feel abdominal, shoulder, or rib pain for a few days due to this gas. You will have pain medicine ordered. Take as directed    BOWELS: constipation is a side effect of your pain meds, take a daily laxative (miralax, dulcolax, etc.) as needed to keep your bowels moving as they normally do, do not go 2-3 days without having a bowel movement. Pain medications; Percocet- take at least 1/2 pill every 6 hours the first 36 hours after surgery, and may take as many as 2 pills every 4 hours. After the first 36hours only take the pills as needed and stop them as soon as possible. Pain meds cause constipation.

## 2022-09-08 ENCOUNTER — TELEPHONE (OUTPATIENT)
Dept: PRIMARY CARE CLINIC | Age: 57
End: 2022-09-08

## 2022-09-08 NOTE — TELEPHONE ENCOUNTER
Akshat GRIFFIN [facility] Sheree [facility]rove Clinical Staff  Subject: Message to Provider     QUESTIONS   Information for Provider? Kelly/ would like the doctor to   know that the PT inpatient on 9/1 for appendicitis at [facility]   ---------------------------------------------------------------------------   --------------   [id number] [employer]   [phone]; OK to leave message on voicemail   ---------------------------------------------------------------------------   --------------   SCRIPT ANSWERS   Relationship to Patient? Third Party   Third Party Type? Other   Other Third Party Type?    Representative Name?  Joy Morris

## 2022-09-14 ENCOUNTER — OFFICE VISIT (OUTPATIENT)
Dept: SURGERY | Age: 57
End: 2022-09-14

## 2022-09-14 VITALS — TEMPERATURE: 98.2 F | DIASTOLIC BLOOD PRESSURE: 71 MMHG | SYSTOLIC BLOOD PRESSURE: 122 MMHG | HEART RATE: 99 BPM

## 2022-09-14 DIAGNOSIS — Z09 POSTOPERATIVE EXAMINATION: Primary | ICD-10-CM

## 2022-09-14 PROCEDURE — 99024 POSTOP FOLLOW-UP VISIT: CPT | Performed by: SURGERY

## 2022-09-14 NOTE — PROGRESS NOTES
epistaxis  Allergy and Immunology ROS: negative itchy/watery eyes or nasal congestion  Hematological and Lymphatic ROS: negative spontaneous bleeding or bruising  Endocrine ROS: negative  lethargy, mood swings, palpitations or polydipsia/polyuria  Respiratory ROS: negative sputum changes, stridor, tachypnea or wheezing  Cardiovascular ROS: negative for - loss of consciousness, murmur or orthopnea  Gastrointestinal ROS: negative for - hematochezia or hematemesis  Genito-Urinary ROS: negative for -  genital discharge or hematuria  Musculoskeletal ROS: negative for - focal weakness, gangrene  Psych/Neuro ROS: negative for - visual or auditory hallucinations, suicidal ideation      Time spent reviewing past medical, surgical, social and family history, vitals, nursing assessment and images. Imaging: n/a    Pathology:   Diagnosis:   Appendix, laparoscopic excision:    Acute and chronic appendicitis with transmural fibrosis and extensive   serosal fibrous adhesions. Small appendiceal diverticulum.      Assessment/Plan:  Gerber Stanley is a 64 y.o. male status post laparoscopic appendectomy, doing well    Diet as tolerated  Follow-up as needed    Physician Signature: Electronically signed by Dr. Trev Zavala  9/14/2022

## 2022-09-20 ENCOUNTER — OFFICE VISIT (OUTPATIENT)
Dept: PRIMARY CARE CLINIC | Age: 57
End: 2022-09-20
Payer: COMMERCIAL

## 2022-09-20 VITALS
WEIGHT: 311.9 LBS | TEMPERATURE: 96.8 F | BODY MASS INDEX: 40.03 KG/M2 | SYSTOLIC BLOOD PRESSURE: 104 MMHG | OXYGEN SATURATION: 98 % | HEART RATE: 100 BPM | DIASTOLIC BLOOD PRESSURE: 60 MMHG | HEIGHT: 74 IN

## 2022-09-20 DIAGNOSIS — E78.2 MIXED HYPERLIPIDEMIA: ICD-10-CM

## 2022-09-20 DIAGNOSIS — E11.40 TYPE 2 DIABETES MELLITUS WITH DIABETIC NEUROPATHY, WITH LONG-TERM CURRENT USE OF INSULIN (HCC): Primary | ICD-10-CM

## 2022-09-20 DIAGNOSIS — E11.29 PROTEINURIA DUE TO TYPE 2 DIABETES MELLITUS (HCC): ICD-10-CM

## 2022-09-20 DIAGNOSIS — Z79.4 CONTROLLED TYPE 2 DIABETES MELLITUS WITH DIABETIC NEUROPATHY, WITH LONG-TERM CURRENT USE OF INSULIN (HCC): ICD-10-CM

## 2022-09-20 DIAGNOSIS — Z79.4 TYPE 2 DIABETES MELLITUS WITH DIABETIC NEUROPATHY, WITH LONG-TERM CURRENT USE OF INSULIN (HCC): Primary | ICD-10-CM

## 2022-09-20 DIAGNOSIS — R80.9 PROTEINURIA DUE TO TYPE 2 DIABETES MELLITUS (HCC): ICD-10-CM

## 2022-09-20 DIAGNOSIS — E11.40 CONTROLLED TYPE 2 DIABETES MELLITUS WITH DIABETIC NEUROPATHY, WITH LONG-TERM CURRENT USE OF INSULIN (HCC): ICD-10-CM

## 2022-09-20 LAB
CHP ED QC CHECK: NORMAL
GLUCOSE BLD-MCNC: 82 MG/DL
HBA1C MFR BLD: 10.6 %

## 2022-09-20 PROCEDURE — 1111F DSCHRG MED/CURRENT MED MERGE: CPT | Performed by: INTERNAL MEDICINE

## 2022-09-20 PROCEDURE — 90674 CCIIV4 VAC NO PRSV 0.5 ML IM: CPT | Performed by: INTERNAL MEDICINE

## 2022-09-20 PROCEDURE — 3017F COLORECTAL CA SCREEN DOC REV: CPT | Performed by: INTERNAL MEDICINE

## 2022-09-20 PROCEDURE — 83036 HEMOGLOBIN GLYCOSYLATED A1C: CPT | Performed by: INTERNAL MEDICINE

## 2022-09-20 PROCEDURE — G8417 CALC BMI ABV UP PARAM F/U: HCPCS | Performed by: INTERNAL MEDICINE

## 2022-09-20 PROCEDURE — 82962 GLUCOSE BLOOD TEST: CPT | Performed by: INTERNAL MEDICINE

## 2022-09-20 PROCEDURE — 1036F TOBACCO NON-USER: CPT | Performed by: INTERNAL MEDICINE

## 2022-09-20 PROCEDURE — 90471 IMMUNIZATION ADMIN: CPT | Performed by: INTERNAL MEDICINE

## 2022-09-20 PROCEDURE — 3046F HEMOGLOBIN A1C LEVEL >9.0%: CPT | Performed by: INTERNAL MEDICINE

## 2022-09-20 PROCEDURE — 2022F DILAT RTA XM EVC RTNOPTHY: CPT | Performed by: INTERNAL MEDICINE

## 2022-09-20 PROCEDURE — G8427 DOCREV CUR MEDS BY ELIG CLIN: HCPCS | Performed by: INTERNAL MEDICINE

## 2022-09-20 PROCEDURE — 99214 OFFICE O/P EST MOD 30 MIN: CPT | Performed by: INTERNAL MEDICINE

## 2022-09-20 RX ORDER — ATORVASTATIN CALCIUM 40 MG/1
40 TABLET, FILM COATED ORAL DAILY
Qty: 90 TABLET | Refills: 0 | Status: SHIPPED
Start: 2022-09-20 | End: 2022-11-02 | Stop reason: SDUPTHER

## 2022-09-20 RX ORDER — LORATADINE 10 MG/1
10 TABLET ORAL DAILY
Qty: 90 TABLET | Refills: 0 | Status: SHIPPED | OUTPATIENT
Start: 2022-09-20

## 2022-09-20 RX ORDER — INSULIN GLARGINE 100 [IU]/ML
20 INJECTION, SOLUTION SUBCUTANEOUS NIGHTLY
Qty: 6 ADJUSTABLE DOSE PRE-FILLED PEN SYRINGE | Refills: 0
Start: 2022-09-20 | End: 2022-11-02 | Stop reason: SDUPTHER

## 2022-09-20 RX ORDER — BLOOD SUGAR DIAGNOSTIC
1 STRIP MISCELLANEOUS 4 TIMES DAILY PRN
Qty: 360 EACH | Refills: 0 | Status: CANCELLED | OUTPATIENT
Start: 2022-09-20

## 2022-09-20 RX ORDER — LISINOPRIL 5 MG/1
5 TABLET ORAL DAILY
Qty: 90 TABLET | Refills: 0 | Status: SHIPPED
Start: 2022-09-20 | End: 2022-11-02 | Stop reason: SDUPTHER

## 2022-09-20 NOTE — PROGRESS NOTES
Chief Complaint   Patient presents with    Immunizations     Flu vaccine    Follow-up     Follow up from hospital visit on Sept 1 for reg. Skin in triage very moist.  1. On way to office he felt like glucose was low- took reading in his car and got 60. Wife gave him 2 M&M's. Patient came into triage about 20 min approx and repeat glucose  resulted at 82.    2. States his fasting glucose was 149 this AM and states he took 1 unit of lantus . And ate small bowl of special K with 2 % milk. Other     Care gaps-- will get flu vaccine    Diabetes     States he needs humalog reordered but unable to pend it. HPI:  Patient is here for follow-up   Patient had an appendectomy at 1/2 weeks ago healed well. Not compliant on insulin takes Lantus 1 unit only at bedtime. Takes Humalog insulin as needed. Patient had hypoglycemic episode this morning. Denies shortness of breath chest pain  Hemoglobin A1c 10.6  . Past Medical History, Surgical History, and Family History has been reviewed and updated.     Review of Systems:  Constitutional:  No fever, no fatigue, no chills, no headaches, no weight change  Dermatology:  No rash, no mole, no dry or sensitive skin  ENT:  No cough, no sore throat, no sinus pain, no runny nose, no ear pain  Cardiology:  No chest pain, no palpitations, no leg edema, no shortness of breath, no PND  Gastroenterology:  No dysphagia, no abdominal pain, no nausea, no vomiting, no constipation, no diarrhea, no heartburn  Musculoskeletal:  No joint pain, no leg cramps, no back pain, no muscle aches  Respiratory:  No shortness of breath, no orthopnea, no wheezing, no GODINEZ, no hemoptysis  Urology:  No blood in the urine, no urinary frequency, no urinary incontinence, no urinary urgency, no nocturia, no dysuria    Vitals:    09/20/22 1107 09/20/22 1128 09/20/22 1146   BP: (!) 90/54 (!) 92/50 104/60   Site: Right Upper Arm  Right Upper Arm   Position: Sitting  Sitting   Cuff Size: Large Adult Pulse: 100     Temp: 96.8 °F (36 °C)     SpO2: 98%     Weight: (!) 311 lb 14.4 oz (141.5 kg)     Height: 6' 2\" (1.88 m)         General:  Patient alert and oriented x 3, NAD, pleasant  HEENT:  Atraumatic, normocephalic, PERRLA, EOMI, clear conjunctiva, TMs clear, nose-clear, throat - no erythema  Neck:  Supple, no goiter, no carotid bruits, no LAD  Lungs:  CTA   Heart:  RRR, no murmurs, gallops or rubs  Abdomen:  Soft/nt/nd, + bowel sounds. Abdominal incision healed.   Extremities:  No clubbing, cyanosis or edema  Skin: unremarkable    Hemoglobin A1C   Date Value Ref Range Status   09/20/2022 10.6 % Final     Cholesterol, Total   Date Value Ref Range Status   08/10/2015 167 0 - 199 mg/dL Final     Triglycerides   Date Value Ref Range Status   08/10/2015 153 (H) 0 - 149 mg/dL Final     HDL   Date Value Ref Range Status   08/10/2015 29 >40 mg/dL Final     LDL Calculated   Date Value Ref Range Status   08/10/2015 107 (H) 0 - 99 mg/dL Final     VLDL Cholesterol Calculated   Date Value Ref Range Status   08/10/2015 31 mg/dL Final     Sodium   Date Value Ref Range Status   08/29/2022 131 (L) 132 - 146 mmol/L Final     Potassium reflex Magnesium   Date Value Ref Range Status   08/29/2022 4.2 3.5 - 5.0 mmol/L Final     Chloride   Date Value Ref Range Status   08/29/2022 97 (L) 98 - 107 mmol/L Final     CO2   Date Value Ref Range Status   08/29/2022 25 22 - 29 mmol/L Final     BUN   Date Value Ref Range Status   08/29/2022 10 6 - 20 mg/dL Final     Creatinine   Date Value Ref Range Status   08/29/2022 0.8 0.7 - 1.2 mg/dL Final     Glucose   Date Value Ref Range Status   09/20/2022 82 mg/dL Final     Calcium   Date Value Ref Range Status   08/29/2022 9.1 8.6 - 10.2 mg/dL Final     Total Protein   Date Value Ref Range Status   06/20/2022 7.1 6.4 - 8.3 g/dL Final     Albumin   Date Value Ref Range Status   06/20/2022 3.4 (L) 3.5 - 5.2 g/dL Final     Total Bilirubin   Date Value Ref Range Status   06/20/2022 0.3 0.0 - 1.2 mg/dL Final     Alkaline Phosphatase   Date Value Ref Range Status   06/20/2022 144 (H) 40 - 129 U/L Final     AST   Date Value Ref Range Status   06/20/2022 16 0 - 39 U/L Final     ALT   Date Value Ref Range Status   06/20/2022 16 0 - 40 U/L Final     GFR Non-   Date Value Ref Range Status   08/29/2022 >60 >=60 mL/min/1.73 Final     Comment:     Chronic Kidney Disease: less than 60 ml/min/1.73 sq.m. Kidney Failure: less than 15 ml/min/1.73 sq.m. Results valid for patients 18 years and older. GFR    Date Value Ref Range Status   08/29/2022 >60  Final        No results found. Assessment/Plan:    Diabetes mellitus type 2 uncontrolled  Diabetic nephropathy  Hypoglycemic episode    Outpatient Encounter Medications as of 9/20/2022   Medication Sig Dispense Refill    loratadine (CLARITIN) 10 MG tablet Take 1 tablet by mouth daily 90 tablet 0    lisinopril (PRINIVIL;ZESTRIL) 5 MG tablet Take 1 tablet by mouth daily 90 tablet 0    Insulin Pen Needle 32G X 4 MM MISC 1 each by Does not apply route 4 times daily (before meals and nightly) 100 each 0    atorvastatin (LIPITOR) 40 MG tablet Take 1 tablet by mouth daily 90 tablet 0    empagliflozin (JARDIANCE) 10 MG tablet Take 1 tablet by mouth daily 90 tablet 0    insulin glargine (LANTUS SOLOSTAR) 100 UNIT/ML injection pen Inject 20 Units into the skin nightly 6 Adjustable Dose Pre-filled Pen Syringe 0    docusate sodium (COLACE) 100 MG capsule Take 1 capsule by mouth 2 times daily 60 capsule 0    Blood Glucose Monitoring Suppl (ONE TOUCH ULTRA 2) w/Device KIT 1 kit by Does not apply route 4 times daily as needed (monitoring blood sugar) Check blood sugar 4 times daily and prn. Please provide diabetic supplies for use with this monitor 1 kit 0    blood glucose test strips (ONETOUCH ULTRA) strip 1 each by In Vitro route 4 times daily as needed (monitor blood sugar with insulin use) As needed.  360 each 0    ONE TOUCH ULTRASOFT LANCETS MISC 1 each by Does not apply route 4 times daily as needed (monitor blood sugar with insulin use) 360 each 0    acetaminophen (TYLENOL) 500 MG tablet Take 2 tablets by mouth every 8 hours as needed for Pain 60 tablet 0    Blood Glucose Monitoring Suppl (D-CARE GLUCOMETER) w/Device KIT 1 each by Does not apply route 4 times daily (with meals and nightly) 1 kit 0    [DISCONTINUED] insulin glargine (LANTUS SOLOSTAR) 100 UNIT/ML injection pen Inject 35 Units into the skin nightly 20 pen 0    [DISCONTINUED] insulin lispro (HUMALOG) 100 UNIT/ML pen Inject 15 Units into the skin 3 times daily (before meals) (Patient taking differently: Inject 15 Units into the skin 3 times daily (before meals) Now on sliding scale, before meals) 5 pen 0    [DISCONTINUED] lisinopril (PRINIVIL;ZESTRIL) 5 MG tablet Take 1 tablet by mouth daily 90 tablet 0    [DISCONTINUED] loratadine (CLARITIN) 10 MG tablet Take 1 tablet by mouth daily 90 tablet 0    [DISCONTINUED] empagliflozin (JARDIANCE) 10 MG tablet Take 1 tablet by mouth daily 90 tablet 0    [DISCONTINUED] atorvastatin (LIPITOR) 40 MG tablet Take 1 tablet by mouth daily 90 tablet 0    [DISCONTINUED] glucose monitoring (FREESTYLE FREEDOM) kit 1 kit by Does not apply route 4 times daily as needed (monitoring blood sugars) 1 kit 0    [DISCONTINUED] FreeStyle Lancets MISC 1 each by Does not apply route 4 times daily as needed (monitor blood sugar) (Patient not taking: Reported on 9/20/2022) 200 each 2    [DISCONTINUED] blood glucose test strips (FREESTYLE LITE) strip 1 each by In Vitro route daily As needed. (Patient not taking: Reported on 9/20/2022) 200 each 2    [DISCONTINUED] Insulin Pen Needle 32G X 4 MM MISC 1 each by Does not apply route 4 times daily (before meals and nightly) 100 each 0     No facility-administered encounter medications on file as of 9/20/2022. Karie Aguila was seen today for immunizations, follow-up, other and diabetes.     Diagnoses and all orders for this visit:    Type 2 diabetes mellitus with diabetic neuropathy, with long-term current use of insulin ([facility])  -     POCT Glucose  -     POCT glycosylated hemoglobin (Hb A1C)  -     empagliflozin (JARDIANCE) 10 MG tablet; Take 1 tablet by mouth daily  -     insulin glargine (LANTUS SOLOSTAR) 100 UNIT/ML injection pen; Inject 20 Units into the skin nightly  -     MICROALBUMIN / CREATININE URINE RATIO; Future    Proteinuria due to type 2 diabetes mellitus ([facility])  -     lisinopril (PRINIVIL;ZESTRIL) 5 MG tablet; Take 1 tablet by mouth daily    Controlled type 2 diabetes mellitus with diabetic neuropathy, with long-term current use of insulin ([facility])  -     Insulin Pen Needle 32G X 4 MM MISC; 1 each by Does not apply route 4 times daily (before meals and nightly)  -     Comprehensive Metabolic Panel; Future    Mixed hyperlipidemia  -     atorvastatin (LIPITOR) 40 MG tablet; Take 1 tablet by mouth daily    Other orders  -     Influenza, FLUCELVAX, (age 10 mo+), IM, PF, 0.5 mL  -     loratadine (CLARITIN) 10 MG tablet; Take 1 tablet by mouth daily         There are no Patient Instructions on file for this visit.            Otilia Rocha MD   9/20/22

## 2022-11-01 ENCOUNTER — HOSPITAL ENCOUNTER (OUTPATIENT)
Age: 57
Discharge: HOME | End: 2022-11-01
Payer: COMMERCIAL

## 2022-11-01 DIAGNOSIS — Z79.4 CONTROLLED TYPE 2 DIABETES MELLITUS WITH DIABETIC NEUROPATHY, WITH LONG-TERM CURRENT USE OF INSULIN (HCC): ICD-10-CM

## 2022-11-01 DIAGNOSIS — Z79.4 TYPE 2 DIABETES MELLITUS WITH DIABETIC NEUROPATHY, WITH LONG-TERM CURRENT USE OF INSULIN (HCC): ICD-10-CM

## 2022-11-01 DIAGNOSIS — E11.40 CONTROLLED TYPE 2 DIABETES MELLITUS WITH DIABETIC NEUROPATHY, WITH LONG-TERM CURRENT USE OF INSULIN (HCC): ICD-10-CM

## 2022-11-01 DIAGNOSIS — E11.40 TYPE 2 DIABETES MELLITUS WITH DIABETIC NEUROPATHY, WITH LONG-TERM CURRENT USE OF INSULIN (HCC): ICD-10-CM

## 2022-11-01 LAB
ALBUMIN SERPL-MCNC: 3.9 G/DL (ref 3.5–5.2)
ALP BLD-CCNC: 132 U/L (ref 40–129)
ALT SERPL-CCNC: 12 U/L (ref 0–40)
ANION GAP SERPL CALCULATED.3IONS-SCNC: 8 MMOL/L (ref 7–16)
AST SERPL-CCNC: 15 U/L (ref 0–39)
BILIRUB SERPL-MCNC: 0.4 MG/DL (ref 0–1.2)
BUN BLDV-MCNC: 18 MG/DL (ref 6–20)
CALCIUM SERPL-MCNC: 9.1 MG/DL (ref 8.6–10.2)
CHLORIDE BLD-SCNC: 97 MMOL/L (ref 98–107)
CO2: 27 MMOL/L (ref 22–29)
CREAT SERPL-MCNC: 0.8 MG/DL (ref 0.7–1.2)
CREATININE URINE: 54 MG/DL (ref 40–278)
GFR SERPL CREATININE-BSD FRML MDRD: >60 ML/MIN/1.73
GLUCOSE BLD-MCNC: 267 MG/DL (ref 74–99)
MICROALBUMIN UR-MCNC: 15.8 MG/L
MICROALBUMIN/CREAT UR-RTO: 29.3 (ref 0–30)
POTASSIUM SERPL-SCNC: 4.7 MMOL/L (ref 3.5–5)
SODIUM BLD-SCNC: 132 MMOL/L (ref 132–146)
TOTAL PROTEIN: 7.7 G/DL (ref 6.4–8.3)

## 2022-11-01 PROCEDURE — 82044 UR ALBUMIN SEMIQUANTITATIVE: CPT

## 2022-11-01 PROCEDURE — 36415 COLL VENOUS BLD VENIPUNCTURE: CPT

## 2022-11-01 PROCEDURE — 80053 COMPREHEN METABOLIC PANEL: CPT

## 2022-11-01 PROCEDURE — 82570 ASSAY OF URINE CREATININE: CPT

## 2022-11-02 ENCOUNTER — OFFICE VISIT (OUTPATIENT)
Dept: PRIMARY CARE CLINIC | Age: 57
End: 2022-11-02
Payer: COMMERCIAL

## 2022-11-02 VITALS
SYSTOLIC BLOOD PRESSURE: 118 MMHG | TEMPERATURE: 98.1 F | HEIGHT: 74 IN | OXYGEN SATURATION: 99 % | DIASTOLIC BLOOD PRESSURE: 68 MMHG | WEIGHT: 308 LBS | HEART RATE: 86 BPM | BODY MASS INDEX: 39.53 KG/M2

## 2022-11-02 DIAGNOSIS — Z79.4 TYPE 2 DIABETES MELLITUS WITH DIABETIC NEUROPATHY, WITH LONG-TERM CURRENT USE OF INSULIN (HCC): Primary | ICD-10-CM

## 2022-11-02 DIAGNOSIS — E78.2 MIXED HYPERLIPIDEMIA: ICD-10-CM

## 2022-11-02 DIAGNOSIS — R80.9 PROTEINURIA DUE TO TYPE 2 DIABETES MELLITUS (HCC): ICD-10-CM

## 2022-11-02 DIAGNOSIS — E11.40 TYPE 2 DIABETES MELLITUS WITH DIABETIC NEUROPATHY, WITH LONG-TERM CURRENT USE OF INSULIN (HCC): Primary | ICD-10-CM

## 2022-11-02 DIAGNOSIS — E11.29 PROTEINURIA DUE TO TYPE 2 DIABETES MELLITUS (HCC): ICD-10-CM

## 2022-11-02 PROCEDURE — 1036F TOBACCO NON-USER: CPT | Performed by: INTERNAL MEDICINE

## 2022-11-02 PROCEDURE — G8482 FLU IMMUNIZE ORDER/ADMIN: HCPCS | Performed by: INTERNAL MEDICINE

## 2022-11-02 PROCEDURE — G8427 DOCREV CUR MEDS BY ELIG CLIN: HCPCS | Performed by: INTERNAL MEDICINE

## 2022-11-02 PROCEDURE — 2022F DILAT RTA XM EVC RTNOPTHY: CPT | Performed by: INTERNAL MEDICINE

## 2022-11-02 PROCEDURE — G8417 CALC BMI ABV UP PARAM F/U: HCPCS | Performed by: INTERNAL MEDICINE

## 2022-11-02 PROCEDURE — 3046F HEMOGLOBIN A1C LEVEL >9.0%: CPT | Performed by: INTERNAL MEDICINE

## 2022-11-02 PROCEDURE — 3017F COLORECTAL CA SCREEN DOC REV: CPT | Performed by: INTERNAL MEDICINE

## 2022-11-02 PROCEDURE — 99213 OFFICE O/P EST LOW 20 MIN: CPT | Performed by: INTERNAL MEDICINE

## 2022-11-02 RX ORDER — LISINOPRIL 5 MG/1
5 TABLET ORAL DAILY
Qty: 90 TABLET | Refills: 0 | Status: SHIPPED | OUTPATIENT
Start: 2022-11-02

## 2022-11-02 RX ORDER — ATORVASTATIN CALCIUM 40 MG/1
40 TABLET, FILM COATED ORAL DAILY
Qty: 90 TABLET | Refills: 0 | Status: SHIPPED | OUTPATIENT
Start: 2022-11-02

## 2022-11-02 RX ORDER — INSULIN GLARGINE 100 [IU]/ML
25 INJECTION, SOLUTION SUBCUTANEOUS NIGHTLY
Qty: 8 ADJUSTABLE DOSE PRE-FILLED PEN SYRINGE | Refills: 0 | Status: SHIPPED | OUTPATIENT
Start: 2022-11-02

## 2022-11-02 NOTE — PROGRESS NOTES
Chief Complaint   Patient presents with    Follow-up     Labs. Diabetes     There was a change in medication at last visit. HPI:  Patient is here for follow-up  Patient feels much better on current regimen of Jardiance 25 mg tablet daily and Lantus 20 units subcu alexx stated that his fingerstick blood sugar in the morning around 110 120. Denied any hypoglycemic episodes he stated he has a lot more energy  Weight is down 3 pounds  Last eye exam was 2 weeks ago he he describes some hemorrhage in the eye. Lab work showed. Blood sugar 287 creatinine>60  Microalbumin creatinine ratio 29.3      Past Medical History, Surgical History, and Family History has been reviewed and updated.     Review of Systems:  Constitutional:  No fever, no fatigue, no chills, no headaches, no weight change  Dermatology:  No rash, no mole, no dry or sensitive skin  ENT:  No cough, no sore throat, no sinus pain, no runny nose, no ear pain  Cardiology:  No chest pain, no palpitations, no leg edema, no shortness of breath, no PND  Gastroenterology:  No dysphagia, no abdominal pain, no nausea, no vomiting, no constipation, no diarrhea, no heartburn  Musculoskeletal:  No joint pain, no leg cramps, no back pain, no muscle aches  Respiratory:  No shortness of breath, no orthopnea, no wheezing, no GODINEZ, no hemoptysis  Urology:  No blood in the urine, no urinary frequency, no urinary incontinence, no urinary urgency, no nocturia, no dysuria    Vitals:    11/02/22 1303   BP: 118/68   Pulse: 86   Temp: 98.1 °F (36.7 °C)   TempSrc: Temporal   SpO2: 99%   Weight: (!) 308 lb (139.7 kg)   Height: 6' 2\" (1.88 m)       General:  Patient alert and oriented x 3, NAD, pleasant  HEENT:  Atraumatic, normocephalic, PERRLA, EOMI, clear conjunctiva, TMs clear, nose-clear, throat - no erythema  Neck:  Supple, no goiter, no carotid bruits, no LAD  Lungs:  CTA   Heart:  RRR, no murmurs, gallops or rubs  Abdomen:  Soft/nt/nd, + bowel sounds  Lymph node examination: unremarkable  Neurological exam : unremarkable  Extremities:  No clubbing, cyanosis or edema  Skin: unremarkable    Hemoglobin A1C   Date Value Ref Range Status   09/20/2022 10.6 % Final     Cholesterol, Total   Date Value Ref Range Status   08/10/2015 167 0 - 199 mg/dL Final     Triglycerides   Date Value Ref Range Status   08/10/2015 153 (H) 0 - 149 mg/dL Final     HDL   Date Value Ref Range Status   08/10/2015 29 >40 mg/dL Final     LDL Calculated   Date Value Ref Range Status   08/10/2015 107 (H) 0 - 99 mg/dL Final     VLDL Cholesterol Calculated   Date Value Ref Range Status   08/10/2015 31 mg/dL Final     Sodium   Date Value Ref Range Status   11/01/2022 132 132 - 146 mmol/L Final     Potassium   Date Value Ref Range Status   11/01/2022 4.7 3.5 - 5.0 mmol/L Final     Chloride   Date Value Ref Range Status   11/01/2022 97 (L) 98 - 107 mmol/L Final     CO2   Date Value Ref Range Status   11/01/2022 27 22 - 29 mmol/L Final     BUN   Date Value Ref Range Status   11/01/2022 18 6 - 20 mg/dL Final     Creatinine   Date Value Ref Range Status   11/01/2022 0.8 0.7 - 1.2 mg/dL Final     Glucose   Date Value Ref Range Status   11/01/2022 267 (H) 74 - 99 mg/dL Final     Calcium   Date Value Ref Range Status   11/01/2022 9.1 8.6 - 10.2 mg/dL Final     Total Protein   Date Value Ref Range Status   11/01/2022 7.7 6.4 - 8.3 g/dL Final     Albumin   Date Value Ref Range Status   11/01/2022 3.9 3.5 - 5.2 g/dL Final     Total Bilirubin   Date Value Ref Range Status   11/01/2022 0.4 0.0 - 1.2 mg/dL Final     Alkaline Phosphatase   Date Value Ref Range Status   11/01/2022 132 (H) 40 - 129 U/L Final     AST   Date Value Ref Range Status   11/01/2022 15 0 - 39 U/L Final     ALT   Date Value Ref Range Status   11/01/2022 12 0 - 40 U/L Final     Est, Glom Filt Rate   Date Value Ref Range Status   11/01/2022 >60 >=60 mL/min/1.73 Final     Comment:     Pediatric calculator link  [email or web address]_[email or web address]  Effective Oct 3, 2022  These results are not intended for use in patients  <25years of age. eGFR results are calculated without  a race factor using the 2021 CKD-EPI equation. Careful  clinical correlation is recommended, particularly when  comparing to results calculated using previous equations. The CKD-EPI equation is less accurate in patients with  extremes of muscle mass, extra-renal metabolism of  creatinine, excessive creatinine ingestion, or following  therapy that affects renal tubular secretion. GFR    Date Value Ref Range Status   08/29/2022 >60  Final        No results found. Assessment/Plan:    Diabetes mellitus type 2 control improved  Proteinuria subsided on ACE inhibitor  Mixed hyperlipidemia      Outpatient Encounter Medications as of 11/2/2022   Medication Sig Dispense Refill    lisinopril (PRINIVIL;ZESTRIL) 5 MG tablet Take 1 tablet by mouth daily 90 tablet 0    atorvastatin (LIPITOR) 40 MG tablet Take 1 tablet by mouth daily 90 tablet 0    empagliflozin (JARDIANCE) 10 MG tablet Take 1 tablet by mouth daily 90 tablet 0    insulin glargine (LANTUS SOLOSTAR) 100 UNIT/ML injection pen Inject 25 Units into the skin nightly 8 Adjustable Dose Pre-filled Pen Syringe 0    loratadine (CLARITIN) 10 MG tablet Take 1 tablet by mouth daily 90 tablet 0    Insulin Pen Needle 32G X 4 MM MISC 1 each by Does not apply route 4 times daily (before meals and nightly) 100 each 0    Blood Glucose Monitoring Suppl (ONE TOUCH ULTRA 2) w/Device KIT 1 kit by Does not apply route 4 times daily as needed (monitoring blood sugar) Check blood sugar 4 times daily and prn. Please provide diabetic supplies for use with this monitor 1 kit 0    blood glucose test strips (ONETOUCH ULTRA) strip 1 each by In Vitro route 4 times daily as needed (monitor blood sugar with insulin use) As needed.  360 each 0    ONE TOUCH ULTRASOFT LANCETS MISC 1 each by Does not apply route 4 times daily as needed (monitor blood sugar with insulin use) 360 each 0    acetaminophen (TYLENOL) 500 MG tablet Take 2 tablets by mouth every 8 hours as needed for Pain 60 tablet 0    Blood Glucose Monitoring Suppl (D-CARE GLUCOMETER) w/Device KIT 1 each by Does not apply route 4 times daily (with meals and nightly) 1 kit 0    [DISCONTINUED] lisinopril (PRINIVIL;ZESTRIL) 5 MG tablet Take 1 tablet by mouth daily 90 tablet 0    [DISCONTINUED] atorvastatin (LIPITOR) 40 MG tablet Take 1 tablet by mouth daily 90 tablet 0    [DISCONTINUED] empagliflozin (JARDIANCE) 10 MG tablet Take 1 tablet by mouth daily 90 tablet 0    [DISCONTINUED] insulin glargine (LANTUS SOLOSTAR) 100 UNIT/ML injection pen Inject 20 Units into the skin nightly 6 Adjustable Dose Pre-filled Pen Syringe 0     No facility-administered encounter medications on file as of 11/2/2022. Mary Grace Rodríguez was seen today for follow-up and diabetes. Diagnoses and all orders for this visit:    Type 2 diabetes mellitus with diabetic neuropathy, with long-term current use of insulin ([facility])  -     empagliflozin (JARDIANCE) 10 MG tablet; Take 1 tablet by mouth daily  -     insulin glargine (LANTUS SOLOSTAR) 100 UNIT/ML injection pen; Inject 25 Units into the skin nightly  -     Comprehensive Metabolic Panel; Future    Proteinuria due to type 2 diabetes mellitus ([facility])  -     lisinopril (PRINIVIL;ZESTRIL) 5 MG tablet; Take 1 tablet by mouth daily    Mixed hyperlipidemia  -     atorvastatin (LIPITOR) 40 MG tablet; Take 1 tablet by mouth daily  -     LIPID PANEL; Future       There are no Patient Instructions on file for this visit. On this date 11/2/2022 I have spent 25 minutes reviewing previous notes, test results and face to face with the patient discussing the diagnosis and importance of compliance with the treatment plan as well as documenting on the day of the visit.       Nolan Pacheco MD   11/2/22

## 2022-12-02 ENCOUNTER — OFFICE VISIT (OUTPATIENT)
Dept: PRIMARY CARE CLINIC | Age: 57
End: 2022-12-02

## 2022-12-02 DIAGNOSIS — R05.9 COUGH IN ADULT: Primary | ICD-10-CM

## 2022-12-02 LAB
Lab: ABNORMAL
Lab: ABNORMAL
PERFORMING INSTRUMENT: ABNORMAL
PERFORMING INSTRUMENT: ABNORMAL
QC PASS/FAIL: ABNORMAL
QC PASS/FAIL: ABNORMAL
SARS-COV-2, POC: DETECTED
SARS-COV-2, POC: DETECTED

## 2022-12-02 PROCEDURE — 87426 SARSCOV CORONAVIRUS AG IA: CPT | Performed by: INTERNAL MEDICINE

## 2023-03-09 ENCOUNTER — OFFICE VISIT (OUTPATIENT)
Dept: PRIMARY CARE CLINIC | Age: 58
End: 2023-03-09
Payer: COMMERCIAL

## 2023-03-09 VITALS
OXYGEN SATURATION: 97 % | DIASTOLIC BLOOD PRESSURE: 70 MMHG | BODY MASS INDEX: 40.05 KG/M2 | HEART RATE: 82 BPM | TEMPERATURE: 97.7 F | SYSTOLIC BLOOD PRESSURE: 102 MMHG | HEIGHT: 74 IN | WEIGHT: 312.1 LBS

## 2023-03-09 DIAGNOSIS — E11.29 PROTEINURIA DUE TO TYPE 2 DIABETES MELLITUS (HCC): ICD-10-CM

## 2023-03-09 DIAGNOSIS — G47.33 OSA (OBSTRUCTIVE SLEEP APNEA): ICD-10-CM

## 2023-03-09 DIAGNOSIS — E78.2 MIXED HYPERLIPIDEMIA: ICD-10-CM

## 2023-03-09 DIAGNOSIS — E66.01 MORBID OBESITY (HCC): ICD-10-CM

## 2023-03-09 DIAGNOSIS — E11.40 CONTROLLED TYPE 2 DIABETES MELLITUS WITH DIABETIC NEUROPATHY, WITH LONG-TERM CURRENT USE OF INSULIN (HCC): Primary | ICD-10-CM

## 2023-03-09 DIAGNOSIS — Z79.4 CONTROLLED TYPE 2 DIABETES MELLITUS WITH DIABETIC NEUROPATHY, WITH LONG-TERM CURRENT USE OF INSULIN (HCC): Primary | ICD-10-CM

## 2023-03-09 DIAGNOSIS — R80.9 PROTEINURIA DUE TO TYPE 2 DIABETES MELLITUS (HCC): ICD-10-CM

## 2023-03-09 LAB — HBA1C MFR BLD: 10.1 %

## 2023-03-09 PROCEDURE — 99214 OFFICE O/P EST MOD 30 MIN: CPT | Performed by: INTERNAL MEDICINE

## 2023-03-09 PROCEDURE — 2022F DILAT RTA XM EVC RTNOPTHY: CPT | Performed by: INTERNAL MEDICINE

## 2023-03-09 PROCEDURE — 1036F TOBACCO NON-USER: CPT | Performed by: INTERNAL MEDICINE

## 2023-03-09 PROCEDURE — G8482 FLU IMMUNIZE ORDER/ADMIN: HCPCS | Performed by: INTERNAL MEDICINE

## 2023-03-09 PROCEDURE — 3017F COLORECTAL CA SCREEN DOC REV: CPT | Performed by: INTERNAL MEDICINE

## 2023-03-09 PROCEDURE — 83036 HEMOGLOBIN GLYCOSYLATED A1C: CPT | Performed by: INTERNAL MEDICINE

## 2023-03-09 PROCEDURE — G8427 DOCREV CUR MEDS BY ELIG CLIN: HCPCS | Performed by: INTERNAL MEDICINE

## 2023-03-09 PROCEDURE — G8417 CALC BMI ABV UP PARAM F/U: HCPCS | Performed by: INTERNAL MEDICINE

## 2023-03-09 PROCEDURE — 3046F HEMOGLOBIN A1C LEVEL >9.0%: CPT | Performed by: INTERNAL MEDICINE

## 2023-03-09 RX ORDER — INSULIN GLARGINE 100 [IU]/ML
40 INJECTION, SOLUTION SUBCUTANEOUS NIGHTLY
Qty: 10 ADJUSTABLE DOSE PRE-FILLED PEN SYRINGE | Refills: 0 | Status: SHIPPED | OUTPATIENT
Start: 2023-03-09

## 2023-03-09 RX ORDER — ATORVASTATIN CALCIUM 40 MG/1
40 TABLET, FILM COATED ORAL DAILY
Qty: 90 TABLET | Refills: 0 | Status: SHIPPED | OUTPATIENT
Start: 2023-03-09

## 2023-03-09 RX ORDER — METFORMIN HYDROCHLORIDE 500 MG/1
1000 TABLET, EXTENDED RELEASE ORAL
Qty: 180 TABLET | Refills: 0 | Status: SHIPPED | OUTPATIENT
Start: 2023-03-09

## 2023-03-09 RX ORDER — LISINOPRIL 5 MG/1
5 TABLET ORAL DAILY
Qty: 90 TABLET | Refills: 0 | Status: SHIPPED | OUTPATIENT
Start: 2023-03-09

## 2023-03-09 RX ORDER — LORATADINE 10 MG/1
10 TABLET ORAL DAILY
Qty: 90 TABLET | Refills: 0 | Status: SHIPPED | OUTPATIENT
Start: 2023-03-09

## 2023-03-09 SDOH — ECONOMIC STABILITY: HOUSING INSECURITY
IN THE LAST 12 MONTHS, WAS THERE A TIME WHEN YOU DID NOT HAVE A STEADY PLACE TO SLEEP OR SLEPT IN A SHELTER (INCLUDING NOW)?: NO

## 2023-03-09 SDOH — ECONOMIC STABILITY: INCOME INSECURITY: HOW HARD IS IT FOR YOU TO PAY FOR THE VERY BASICS LIKE FOOD, HOUSING, MEDICAL CARE, AND HEATING?: NOT VERY HARD

## 2023-03-09 SDOH — ECONOMIC STABILITY: FOOD INSECURITY: WITHIN THE PAST 12 MONTHS, THE FOOD YOU BOUGHT JUST DIDN'T LAST AND YOU DIDN'T HAVE MONEY TO GET MORE.: NEVER TRUE

## 2023-03-09 SDOH — ECONOMIC STABILITY: FOOD INSECURITY: WITHIN THE PAST 12 MONTHS, YOU WORRIED THAT YOUR FOOD WOULD RUN OUT BEFORE YOU GOT MONEY TO BUY MORE.: NEVER TRUE

## 2023-03-09 ASSESSMENT — PATIENT HEALTH QUESTIONNAIRE - PHQ9
SUM OF ALL RESPONSES TO PHQ QUESTIONS 1-9: 0
SUM OF ALL RESPONSES TO PHQ9 QUESTIONS 1 & 2: 0
1. LITTLE INTEREST OR PLEASURE IN DOING THINGS: 0
SUM OF ALL RESPONSES TO PHQ QUESTIONS 1-9: 0
2. FEELING DOWN, DEPRESSED OR HOPELESS: 0

## 2023-03-09 NOTE — PROGRESS NOTES
Chief Complaint   Patient presents with    Diabetes     A1C, wife feels like he might be stressed out or a little depressed       HPI:  Patient is here for follow-up   Patient stated he is compliant on Jardiance and Lantus 25 units subcu daily  No hypoglycemic episodes. Hemoglobin A1c today 10.1 has steadily declined from 13 in November 2022  Denied cardiopulmonary symptoms no recent lab work  Requested refills on medications    Patient is compliant on CPAP mask every night. Complain of nasal dryness he was advised to add distilled water to provide humidity to the CPAP    Past Medical History, Surgical History, and Family History has been reviewed and updated.     Review of Systems:  Constitutional:  No fever, no fatigue, no chills, no headaches, no weight change  Dermatology:  No rash, no mole, no dry or sensitive skin  ENT:  No cough, no sore throat, no sinus pain, no runny nose, no ear pain  Cardiology:  No chest pain, no palpitations, no leg edema, no shortness of breath, no PND  Gastroenterology:  No dysphagia, no abdominal pain, no nausea, no vomiting, no constipation, no diarrhea, no heartburn  Musculoskeletal:  No joint pain, no leg cramps, no back pain, no muscle aches  Respiratory:  No shortness of breath, no orthopnea, no wheezing, no GODINEZ, no hemoptysis  Urology:  No blood in the urine, no urinary frequency, no urinary incontinence, no urinary urgency, no nocturia, no dysuria    Vitals:    03/09/23 1448   BP: 102/70   Site: Right Upper Arm   Position: Sitting   Cuff Size: Large Adult   Pulse: 82   Temp: 97.7 °F (36.5 °C)   TempSrc: Temporal   SpO2: 97%   Weight: (!) 312 lb 1.6 oz (141.6 kg)   Height: 6' 2\" (1.88 m)       General:  Patient alert and oriented x 3, NAD, pleasant  HEENT:  Atraumatic, normocephalic, PERRLA, EOMI, clear conjunctiva, TMs clear, nose-clear, throat - no erythema  Neck:  Supple, no goiter, no carotid bruits, no LAD  Lungs:  CTA   Heart:  RRR, no murmurs, gallops or rubs  Abdomen: Soft/nt/nd, + bowel sounds  Lymph node examination: unremarkable  Neurological exam : unremarkable  Extremities:  No clubbing, cyanosis or edema  Skin: unremarkable    Hemoglobin A1C   Date Value Ref Range Status   03/09/2023 10.1 % Final     Cholesterol, Total   Date Value Ref Range Status   08/10/2015 167 0 - 199 mg/dL Final     Triglycerides   Date Value Ref Range Status   08/10/2015 153 (H) 0 - 149 mg/dL Final     HDL   Date Value Ref Range Status   08/10/2015 29 >40 mg/dL Final     LDL Calculated   Date Value Ref Range Status   08/10/2015 107 (H) 0 - 99 mg/dL Final     VLDL Cholesterol Calculated   Date Value Ref Range Status   08/10/2015 31 mg/dL Final     Sodium   Date Value Ref Range Status   11/01/2022 132 132 - 146 mmol/L Final     Potassium   Date Value Ref Range Status   11/01/2022 4.7 3.5 - 5.0 mmol/L Final     Chloride   Date Value Ref Range Status   11/01/2022 97 (L) 98 - 107 mmol/L Final     CO2   Date Value Ref Range Status   11/01/2022 27 22 - 29 mmol/L Final     BUN   Date Value Ref Range Status   11/01/2022 18 6 - 20 mg/dL Final     Creatinine   Date Value Ref Range Status   11/01/2022 0.8 0.7 - 1.2 mg/dL Final     Glucose   Date Value Ref Range Status   11/01/2022 267 (H) 74 - 99 mg/dL Final     Calcium   Date Value Ref Range Status   11/01/2022 9.1 8.6 - 10.2 mg/dL Final     Total Protein   Date Value Ref Range Status   11/01/2022 7.7 6.4 - 8.3 g/dL Final     Albumin   Date Value Ref Range Status   11/01/2022 3.9 3.5 - 5.2 g/dL Final     Total Bilirubin   Date Value Ref Range Status   11/01/2022 0.4 0.0 - 1.2 mg/dL Final     Alkaline Phosphatase   Date Value Ref Range Status   11/01/2022 132 (H) 40 - 129 U/L Final     AST   Date Value Ref Range Status   11/01/2022 15 0 - 39 U/L Final     ALT   Date Value Ref Range Status   11/01/2022 12 0 - 40 U/L Final     Est, Glom Filt Rate   Date Value Ref Range Status   11/01/2022 >60 >=60 mL/min/1.73 Final     Comment:     Pediatric calculator  link  [email or web address]  Effective Oct 3, 2022  These results are not intended for use in patients  <25years of age. eGFR results are calculated without  a race factor using the 2021 CKD-EPI equation. Careful  clinical correlation is recommended, particularly when  comparing to results calculated using previous equations. The CKD-EPI equation is less accurate in patients with  extremes of muscle mass, extra-renal metabolism of  creatinine, excessive creatinine ingestion, or following  therapy that affects renal tubular secretion. GFR    Date Value Ref Range Status   08/29/2022 >60  Final        No results found. Assessment/Plan:   Diabetes mellitus type 2 uncontrolled. Good response to current regimen of Jardiance and Lantus.   Will increase Lantus to 40 units subcu daily add metformin ER 1000 mg tablet daily continue Jardiance 10 mg tablet daily follow lab work  Essential hypertension controlled  Hyperlipidemia  Morbid obesity  Obstructive sleep apnea controlled    Outpatient Encounter Medications as of 3/9/2023   Medication Sig Dispense Refill    atorvastatin (LIPITOR) 40 MG tablet Take 1 tablet by mouth daily 90 tablet 0    empagliflozin (JARDIANCE) 10 MG tablet Take 1 tablet by mouth daily 90 tablet 0    insulin glargine (LANTUS SOLOSTAR) 100 UNIT/ML injection pen Inject 40 Units into the skin nightly 10 Adjustable Dose Pre-filled Pen Syringe 0    Insulin Pen Needle 32G X 4 MM MISC 1 each by Does not apply route 4 times daily (before meals and nightly) 100 each 0    lisinopril (PRINIVIL;ZESTRIL) 5 MG tablet Take 1 tablet by mouth daily 90 tablet 0    loratadine (CLARITIN) 10 MG tablet Take 1 tablet by mouth daily 90 tablet 0    metFORMIN (GLUCOPHAGE-XR) 500 MG extended release tablet Take 2 tablets by mouth Daily with supper 180 tablet 0    [DISCONTINUED] lisinopril (PRINIVIL;ZESTRIL) 5 MG tablet Take 1 tablet by mouth daily 90 tablet 0 [DISCONTINUED] atorvastatin (LIPITOR) 40 MG tablet Take 1 tablet by mouth daily 90 tablet 0    [DISCONTINUED] empagliflozin (JARDIANCE) 10 MG tablet Take 1 tablet by mouth daily 90 tablet 0    [DISCONTINUED] insulin glargine (LANTUS SOLOSTAR) 100 UNIT/ML injection pen Inject 25 Units into the skin nightly 8 Adjustable Dose Pre-filled Pen Syringe 0    [DISCONTINUED] loratadine (CLARITIN) 10 MG tablet Take 1 tablet by mouth daily 90 tablet 0    [DISCONTINUED] Insulin Pen Needle 32G X 4 MM MISC 1 each by Does not apply route 4 times daily (before meals and nightly) 100 each 0    Blood Glucose Monitoring Suppl (ONE TOUCH ULTRA 2) w/Device KIT 1 kit by Does not apply route 4 times daily as needed (monitoring blood sugar) Check blood sugar 4 times daily and prn. Please provide diabetic supplies for use with this monitor 1 kit 0    blood glucose test strips (ONETOUCH ULTRA) strip 1 each by In Vitro route 4 times daily as needed (monitor blood sugar with insulin use) As needed. 360 each 0    ONE TOUCH ULTRASOFT LANCETS MISC 1 each by Does not apply route 4 times daily as needed (monitor blood sugar with insulin use) 360 each 0    acetaminophen (TYLENOL) 500 MG tablet Take 2 tablets by mouth every 8 hours as needed for Pain 60 tablet 0    Blood Glucose Monitoring Suppl (D-CARE GLUCOMETER) w/Device KIT 1 each by Does not apply route 4 times daily (with meals and nightly) 1 kit 0     No facility-administered encounter medications on file as of 3/9/2023. Gissell Bello was seen today for diabetes. Diagnoses and all orders for this visit:    Controlled type 2 diabetes mellitus with diabetic neuropathy, with long-term current use of insulin ([facility])  -     POCT glycosylated hemoglobin (Hb A1C)  -     Insulin Pen Needle 32G X 4 MM MISC; 1 each by Does not apply route 4 times daily (before meals and nightly)  -     metFORMIN (GLUCOPHAGE-XR) 500 MG extended release tablet;  Take 2 tablets by mouth Daily with supper  -     Lipid Panel; Future    Mixed hyperlipidemia  -     atorvastatin (LIPITOR) 40 MG tablet; Take 1 tablet by mouth daily  -     CBC with Auto Differential; Future  -     Comprehensive Metabolic Panel; Future  -     Urinalysis; Future    Proteinuria due to type 2 diabetes mellitus (HCC)  -     lisinopril (PRINIVIL;ZESTRIL) 5 MG tablet; Take 1 tablet by mouth daily  -     MICROALBUMIN / CREATININE URINE RATIO; Future    PINEDA (obstructive sleep apnea)    Morbid obesity (HCC)    Other orders  -     empagliflozin (JARDIANCE) 10 MG tablet; Take 1 tablet by mouth daily  -     insulin glargine (LANTUS SOLOSTAR) 100 UNIT/ML injection pen; Inject 40 Units into the skin nightly  -     loratadine (CLARITIN) 10 MG tablet; Take 1 tablet by mouth daily       There are no Patient Instructions on file for this visit. On this date 3/9/2023 I have spent 25 minutes reviewing previous notes, test results and face to face with the patient discussing the diagnosis and importance of compliance with the treatment plan as well as documenting on the day of the visit.       Genny Edwards MD   3/9/23

## 2023-03-20 DIAGNOSIS — Z79.4 CONTROLLED TYPE 2 DIABETES MELLITUS WITH DIABETIC NEUROPATHY, WITH LONG-TERM CURRENT USE OF INSULIN (HCC): ICD-10-CM

## 2023-03-20 DIAGNOSIS — E11.40 CONTROLLED TYPE 2 DIABETES MELLITUS WITH DIABETIC NEUROPATHY, WITH LONG-TERM CURRENT USE OF INSULIN (HCC): ICD-10-CM

## 2023-03-20 RX ORDER — BLOOD SUGAR DIAGNOSTIC
1 STRIP MISCELLANEOUS 4 TIMES DAILY PRN
Qty: 360 EACH | Refills: 5 | Status: SHIPPED | OUTPATIENT
Start: 2023-03-20

## 2023-06-02 RX ORDER — INSULIN GLARGINE 100 [IU]/ML
40 INJECTION, SOLUTION SUBCUTANEOUS NIGHTLY
Qty: 10 ADJUSTABLE DOSE PRE-FILLED PEN SYRINGE | Refills: 0 | Status: SHIPPED | OUTPATIENT
Start: 2023-06-02

## 2023-06-02 NOTE — TELEPHONE ENCOUNTER
----- Message from Kelly Stafford sent at 6/2/2023  9:46 AM EDT -----  Subject: Refill Request    QUESTIONS  Name of Medication? empagliflozin (JARDIANCE) 10 MG tablet  Patient-reported dosage and instructions? one tablet twice a day   How many days do you have left? 2  Preferred Pharmacy? [id number] phone number (if available)? [phone]  ---------------------------------------------------------------------------  --------------,  Name of Medication? insulin glargine (LANTUS SOLOSTAR) 100 UNIT/ML   injection pen  Patient-reported dosage and instructions? 3 times a day   How many days do you have left? 40  Preferred Pharmacy? [address] [facility]  [facility] phone number (if available)? [phone]  ---------------------------------------------------------------------------  --------------  [email or web address]  What is the best way for the office to contact you? OK to leave message on   voicemail  Preferred Call Back Phone Number? [phone]  ---------------------------------------------------------------------------  --------------  SCRIPT ANSWERS  Relationship to Patient?  Self

## 2023-08-16 RX ORDER — INSULIN GLARGINE 100 [IU]/ML
40 INJECTION, SOLUTION SUBCUTANEOUS NIGHTLY
Qty: 10 ADJUSTABLE DOSE PRE-FILLED PEN SYRINGE | Refills: 0 | Status: SHIPPED | OUTPATIENT
Start: 2023-08-16

## 2023-08-16 RX ORDER — INSULIN GLARGINE 100 [IU]/ML
INJECTION, SOLUTION SUBCUTANEOUS
Qty: 30 ML | Refills: 0 | OUTPATIENT
Start: 2023-08-16

## 2023-08-16 RX ORDER — EMPAGLIFLOZIN 10 MG/1
TABLET, FILM COATED ORAL
Qty: 90 TABLET | Refills: 0 | OUTPATIENT
Start: 2023-08-16

## 2023-09-19 RX ORDER — INSULIN GLARGINE 100 [IU]/ML
40 INJECTION, SOLUTION SUBCUTANEOUS NIGHTLY
Qty: 30 ML | Refills: 0 | Status: SHIPPED | OUTPATIENT
Start: 2023-09-19

## 2023-09-19 RX ORDER — EMPAGLIFLOZIN 10 MG/1
10 TABLET, FILM COATED ORAL DAILY
Qty: 90 TABLET | Refills: 0 | Status: SHIPPED | OUTPATIENT
Start: 2023-09-19

## 2023-11-08 RX ORDER — LORATADINE 10 MG/1
10 TABLET ORAL DAILY
Qty: 90 TABLET | Refills: 0 | Status: SHIPPED | OUTPATIENT
Start: 2023-11-08

## 2023-11-08 RX ORDER — INSULIN GLARGINE 100 [IU]/ML
40 INJECTION, SOLUTION SUBCUTANEOUS NIGHTLY
Qty: 30 ML | Refills: 0 | Status: SHIPPED | OUTPATIENT
Start: 2023-11-08

## 2024-01-12 RX ORDER — LORATADINE 10 MG/1
10 TABLET ORAL DAILY
Qty: 90 TABLET | Refills: 0 | Status: SHIPPED | OUTPATIENT
Start: 2024-01-12

## 2024-01-12 RX ORDER — INSULIN GLARGINE 100 [IU]/ML
40 INJECTION, SOLUTION SUBCUTANEOUS NIGHTLY
Qty: 30 ML | Refills: 0 | Status: SHIPPED | OUTPATIENT
Start: 2024-01-12

## 2024-01-12 RX ORDER — EMPAGLIFLOZIN 10 MG/1
10 TABLET, FILM COATED ORAL DAILY
Qty: 90 TABLET | Refills: 0 | Status: SHIPPED | OUTPATIENT
Start: 2024-01-12

## (undated) DEVICE — SPONGE LAP W18XL18IN WHT COT 4 PLY FLD STRUNG RADPQ DISP ST

## (undated) DEVICE — 34" SINGLE PATIENT USE HOVERMATT BREATHABLE: Brand: SINGLE PATIENT USE HOVERMATT

## (undated) DEVICE — RELOAD STPL L75MM OPN H3.8MM CLS 1.5MM WIRE DIA0.2MM REG

## (undated) DEVICE — RELOAD STPL L60MM H1-2.6MM MESENTERY THN TISS WHT 6 ROW

## (undated) DEVICE — PACK EXT II SIRUS

## (undated) DEVICE — MARKER,SKIN,WI/RULER AND LABELS: Brand: MEDLINE

## (undated) DEVICE — GAUZE,SPONGE,4"X4",16PLY,XRAY,STRL,LF: Brand: MEDLINE

## (undated) DEVICE — DOUBLE BASIN SET: Brand: MEDLINE INDUSTRIES, INC.

## (undated) DEVICE — SHEET,DRAPE,40X58,STERILE: Brand: MEDLINE

## (undated) DEVICE — KIT BEDSIDE REVITAL OX 500ML

## (undated) DEVICE — APPLICATOR MEDICATED 26 CC SOLUTION HI LT ORNG CHLORAPREP

## (undated) DEVICE — GLOVE SURG SZ 65 THK91MIL LTX FREE SYN POLYISOPRENE

## (undated) DEVICE — SOLUTION IRRIG 3000ML 0.9% SOD CHL USP UROMATIC PLAS CONT

## (undated) DEVICE — ELECTROSURGICAL PENCIL BUTTON SWITCH E-Z CLEAN COATED BLADE ELECTRODE 10 FT (3 M) CORD HOLSTER: Brand: MEGADYNE

## (undated) DEVICE — PACK SURG LAP CHOLE CUSTOM

## (undated) DEVICE — TROCAR: Brand: KII SLEEVE

## (undated) DEVICE — APPLIER CLP L SHFT DIA12MM 20 ROT MULT LIGACLP

## (undated) DEVICE — YANKAUER,BULB TIP,W/O VENT,RIGID,STERILE: Brand: MEDLINE

## (undated) DEVICE — GENERATOR ELECSURG FORCETRAID

## (undated) DEVICE — Device: Brand: DEFENDO VALVE AND CONNECTOR KIT

## (undated) DEVICE — TROCAR: Brand: KII FIOS FIRST ENTRY

## (undated) DEVICE — 40586 ADVANCED TRENDELENBURG POSITIONING KIT: Brand: 40586 ADVANCED TRENDELENBURG POSITIONING KIT

## (undated) DEVICE — ELECTRODE PT RET AD L9FT HI MOIST COND ADH HYDRGEL CORDED

## (undated) DEVICE — TOWEL,OR,DSP,ST,BLUE,STD,6/PK,12PK/CS: Brand: MEDLINE

## (undated) DEVICE — STANDARD HYPODERMIC NEEDLE,POLYPROPYLENE HUB: Brand: MONOJECT

## (undated) DEVICE — APPLIER CLP M/L SHFT DIA5MM 15 LIG LIGAMAX 5

## (undated) DEVICE — TUBING, SUCTION, 1/4" X 10', STRAIGHT: Brand: MEDLINE

## (undated) DEVICE — DRILL STRYKER REM-B

## (undated) DEVICE — TRAY PROCED CUSTOM GASTROINTESTINAL

## (undated) DEVICE — NEEDLE HYPO 25GA L1.5IN BLU POLYPR HUB S STL REG BVL STR

## (undated) DEVICE — SPONGE GZ 4IN 4IN 4 PLY N WVN AVANT

## (undated) DEVICE — BLADE ES ELASTOMERIC COAT INSUL DURABLE BEND UPTO 90DEG

## (undated) DEVICE — PMI PTFE COATED LAPAROSCOPIC WIRE L-HOOK 44 CM: Brand: PMI

## (undated) DEVICE — GOWN ISOLATN REG YEL M WT MULTIPLY SIDETIE LEV 2

## (undated) DEVICE — INSUFFLATION NEEDLE TO ESTABLISH PNEUMOPERITONEUM.: Brand: INSUFFLATION NEEDLE

## (undated) DEVICE — STAPLER INT L75MM CUT LN L73MM STPL LN L77MM BLU B FRM 8

## (undated) DEVICE — SET SURG INSTR PODI

## (undated) DEVICE — GLOVE ORANGE PI 7   MSG9070

## (undated) DEVICE — SET MAJOR INSTR HOUSE

## (undated) DEVICE — STAPLER EXT 65MM S STL AUTO DISP PURSTRING

## (undated) DEVICE — GOWN,SIRUS,NONRNF,SETINSLV,XL,20/CS: Brand: MEDLINE

## (undated) DEVICE — BASIC SINGLE BASIN 1-LF: Brand: MEDLINE INDUSTRIES, INC.

## (undated) DEVICE — CONTAINER SPEC COLL 960ML POLYPR TRIANG GRAD INTAKE/OUTPUT

## (undated) DEVICE — MEDI-VAC YANKAUER SUCTION HANDLE: Brand: CARDINAL HEALTH

## (undated) DEVICE — 3M™ IOBAN™ 2 ANTIMICROBIAL INCISE DRAPE 6640EZ: Brand: IOBAN™ 2

## (undated) DEVICE — COVER,LIGHT HANDLE,FLX,1/PK: Brand: MEDLINE INDUSTRIES, INC.

## (undated) DEVICE — NDL CNTR 40CT FM MAG: Brand: MEDLINE INDUSTRIES, INC.

## (undated) DEVICE — CAMERA STRYKER 1488 HD GEN

## (undated) DEVICE — Z DISCONTINUED SUGG SUB 2622703 KIT OST L12IN FLNG DIA70MM ST TRNSPAR TWO PC MOLD

## (undated) DEVICE — PUMP SUC IRR TBNG L10FT W/ HNDPC ASSEMB STRYKEFLOW 2

## (undated) DEVICE — COVER,TABLE,44X90,STERILE: Brand: MEDLINE

## (undated) DEVICE — SYRINGE MED 10ML TRNSLUC BRL PLUNG BLK MRK POLYPR CTRL

## (undated) DEVICE — [HIGH FLOW INSUFFLATOR,  DO NOT USE IF PACKAGE IS DAMAGED,  KEEP DRY,  KEEP AWAY FROM SUNLIGHT,  PROTECT FROM HEAT AND RADIOACTIVE SOURCES.]: Brand: PNEUMOSURE

## (undated) DEVICE — GAUZE,SPONGE,4"X4",16PLY,STRL,LF,10/TRAY: Brand: MEDLINE

## (undated) DEVICE — PLUMEPORT LAPAROSCOPIC SMOKE FILTRATION DEVICE: Brand: PLUMEPORT ACTIV

## (undated) DEVICE — 6 X 9  1.75MIL 4-WALL LABGUARD: Brand: MINIGRIP COMMERCIAL LLC

## (undated) DEVICE — SPONGE,LAP,12"X12",XR,ST,5/PK,40PK/CS: Brand: MEDLINE

## (undated) DEVICE — TOTAL TRAY, 16FR 10ML SIL FOLEY, URN: Brand: MEDLINE

## (undated) DEVICE — SYRINGE, LUER LOCK, 10ML: Brand: MEDLINE

## (undated) DEVICE — GAUZE SPONGES,USP TYPE VII GAUZE, 12 PLY: Brand: CURITY

## (undated) DEVICE — SUTURE BAG: Brand: DEVON

## (undated) DEVICE — MASK,FACE,MAXFLUIDPROTECT,SHIELD/ERLPS: Brand: MEDLINE

## (undated) DEVICE — DRESSING PETRO W3XL3IN OIL EMUL N ADH GZ KNIT IMPREG CELOS

## (undated) DEVICE — GARMENT,MEDLINE,DVT,INT,CALF,MED, GEN2: Brand: MEDLINE

## (undated) DEVICE — KENDALL 450 SERIES MONITORING FOAM ELECTRODE - RECTANGULAR SHAPE ( 3/PK): Brand: KENDALL

## (undated) DEVICE — SEALER TISS L45CM ADV BPLR STR TIP LAP APPRCH ENSEAL G2

## (undated) DEVICE — GLOVE ORANGE PI 7 1/2   MSG9075

## (undated) DEVICE — CONTROL SYRINGE LUER-LOCK TIP: Brand: MONOJECT

## (undated) DEVICE — SYRINGE IRRIG 60ML SFT PLIABLE BLB EZ TO GRP 1 HND USE W/

## (undated) DEVICE — INTENDED FOR TISSUE SEPARATION, AND OTHER PROCEDURES THAT REQUIRE A SHARP SURGICAL BLADE TO PUNCTURE OR CUT.: Brand: BARD-PARKER ® STAINLESS STEEL BLADES

## (undated) DEVICE — RELOAD STPL L45MM H1.5-3.6MM REG TISS BLU GRIPPING SURF B

## (undated) DEVICE — PATIENT RETURN ELECTRODE, SINGLE-USE, CONTACT QUALITY MONITORING, ADULT, WITH 9FT CORD, FOR PATIENTS WEIGING OVER 33LBS. (15KG): Brand: MEGADYNE

## (undated) DEVICE — STAPLER SKIN L440MM 32MM LNG 12 FIRING B FRM PWR + GRIPPING

## (undated) DEVICE — LUBRICANT SURG JELLY ST BACTER TUBE 4.25OZ

## (undated) DEVICE — FORCEPS BX L240CM JAW DIA2.8MM L CAP W/ NDL MIC MESH TOOTH

## (undated) DEVICE — HYDROPHILIC COATED RED RUBBER URETHRAL CATHETER, SMOOTH ROUNDED TIP, 20 FR (6.7 MM): Brand: DOVER